# Patient Record
Sex: FEMALE | Race: WHITE | NOT HISPANIC OR LATINO | Employment: OTHER | URBAN - METROPOLITAN AREA
[De-identification: names, ages, dates, MRNs, and addresses within clinical notes are randomized per-mention and may not be internally consistent; named-entity substitution may affect disease eponyms.]

---

## 2017-01-30 ENCOUNTER — HOSPITAL ENCOUNTER (OUTPATIENT)
Dept: MAMMOGRAPHY | Facility: HOSPITAL | Age: 72
Discharge: HOME | End: 2017-01-30
Admitting: FAMILY MEDICINE

## 2017-01-30 DIAGNOSIS — Z12.31 VISIT FOR SCREENING MAMMOGRAM: ICD-10-CM

## 2017-01-30 PROCEDURE — 77063 BREAST TOMOSYNTHESIS BI: CPT | Performed by: RADIOLOGY

## 2017-01-30 PROCEDURE — G0202 SCR MAMMO BI INCL CAD: HCPCS

## 2017-01-30 PROCEDURE — G0202 SCR MAMMO BI INCL CAD: HCPCS | Performed by: RADIOLOGY

## 2017-01-30 PROCEDURE — 77063 BREAST TOMOSYNTHESIS BI: CPT

## 2017-03-30 ENCOUNTER — OFFICE VISIT (OUTPATIENT)
Dept: FAMILY MEDICINE CLINIC | Facility: CLINIC | Age: 72
End: 2017-03-30

## 2017-03-30 ENCOUNTER — APPOINTMENT (OUTPATIENT)
Dept: LAB | Facility: HOSPITAL | Age: 72
End: 2017-03-30

## 2017-03-30 VITALS
OXYGEN SATURATION: 98 % | WEIGHT: 127 LBS | SYSTOLIC BLOOD PRESSURE: 126 MMHG | DIASTOLIC BLOOD PRESSURE: 82 MMHG | TEMPERATURE: 98.2 F | BODY MASS INDEX: 19.93 KG/M2 | HEIGHT: 67 IN | HEART RATE: 81 BPM

## 2017-03-30 DIAGNOSIS — I10 ESSENTIAL HYPERTENSION: ICD-10-CM

## 2017-03-30 DIAGNOSIS — Z79.890 HORMONE REPLACEMENT THERAPY: ICD-10-CM

## 2017-03-30 PROBLEM — N95.1 MENOPAUSAL AND FEMALE CLIMACTERIC STATES: Status: ACTIVE | Noted: 2017-03-30

## 2017-03-30 LAB
ALBUMIN SERPL-MCNC: 4.5 G/DL (ref 3.2–4.8)
ALBUMIN/GLOB SERPL: 1.7 G/DL (ref 1.5–2.5)
ALP SERPL-CCNC: 61 U/L (ref 25–100)
ALT SERPL W P-5'-P-CCNC: 18 U/L (ref 7–40)
ANION GAP SERPL CALCULATED.3IONS-SCNC: 3 MMOL/L (ref 3–11)
ARTICHOKE IGE QN: 100 MG/DL (ref 0–130)
AST SERPL-CCNC: 24 U/L (ref 0–33)
BILIRUB SERPL-MCNC: 0.7 MG/DL (ref 0.3–1.2)
BUN BLD-MCNC: 14 MG/DL (ref 9–23)
BUN/CREAT SERPL: 20 (ref 7–25)
CALCIUM SPEC-SCNC: 10.1 MG/DL (ref 8.7–10.4)
CHLORIDE SERPL-SCNC: 104 MMOL/L (ref 99–109)
CHOLEST SERPL-MCNC: 216 MG/DL (ref 0–200)
CO2 SERPL-SCNC: 36 MMOL/L (ref 20–31)
CREAT BLD-MCNC: 0.7 MG/DL (ref 0.6–1.3)
GFR SERPL CREATININE-BSD FRML MDRD: 83 ML/MIN/1.73
GLOBULIN UR ELPH-MCNC: 2.7 GM/DL
GLUCOSE BLD-MCNC: 99 MG/DL (ref 70–100)
HDLC SERPL-MCNC: 102 MG/DL (ref 40–60)
POTASSIUM BLD-SCNC: 5.2 MMOL/L (ref 3.5–5.5)
PROT SERPL-MCNC: 7.2 G/DL (ref 5.7–8.2)
SODIUM BLD-SCNC: 143 MMOL/L (ref 132–146)
TRIGL SERPL-MCNC: 58 MG/DL (ref 0–150)

## 2017-03-30 PROCEDURE — G0439 PPPS, SUBSEQ VISIT: HCPCS | Performed by: PHYSICIAN ASSISTANT

## 2017-03-30 PROCEDURE — 93000 ELECTROCARDIOGRAM COMPLETE: CPT | Performed by: PHYSICIAN ASSISTANT

## 2017-03-30 PROCEDURE — 80053 COMPREHEN METABOLIC PANEL: CPT | Performed by: PHYSICIAN ASSISTANT

## 2017-03-30 PROCEDURE — 80061 LIPID PANEL: CPT | Performed by: PHYSICIAN ASSISTANT

## 2017-03-30 PROCEDURE — 36415 COLL VENOUS BLD VENIPUNCTURE: CPT | Performed by: PHYSICIAN ASSISTANT

## 2017-03-30 RX ORDER — CONTAINER,EMPTY
1 BOTTLE MISCELLANEOUS DAILY
COMMUNITY
End: 2021-05-04

## 2017-03-30 RX ORDER — LISINOPRIL 10 MG/1
10 TABLET ORAL DAILY
Qty: 90 TABLET | Refills: 1 | Status: SHIPPED | OUTPATIENT
Start: 2017-03-30 | End: 2017-10-17 | Stop reason: SDUPTHER

## 2017-03-30 RX ORDER — ASPIRIN 81 MG/1
81 TABLET ORAL DAILY
COMMUNITY
End: 2018-10-02 | Stop reason: ALTCHOICE

## 2017-03-30 RX ORDER — ESTRADIOL 0.05 MG/D
1 PATCH TRANSDERMAL WEEKLY
Qty: 12 PATCH | Refills: 3 | Status: SHIPPED | OUTPATIENT
Start: 2017-03-30 | End: 2018-04-09 | Stop reason: SDUPTHER

## 2017-03-30 RX ORDER — AMOXICILLIN 875 MG/1
875 TABLET, COATED ORAL 2 TIMES DAILY
Qty: 14 TABLET | Refills: 0 | Status: SHIPPED | OUTPATIENT
Start: 2017-03-30 | End: 2018-04-09

## 2017-03-30 RX ORDER — METRONIDAZOLE 7.5 MG/G
GEL TOPICAL 2 TIMES DAILY PRN
COMMUNITY
End: 2018-12-04

## 2017-03-30 RX ORDER — MELATONIN
1000 DAILY
COMMUNITY

## 2017-03-30 NOTE — PROGRESS NOTES
QUICK REFERENCE INFORMATION:  The ABCs of the Annual Wellness Visit    Subsequent Medicare Wellness Visit    HEALTH RISK ASSESSMENT    1945    Recent Hospitalizations:  No recent hospitalization(s)..        Current Medical Providers:  Patient Care Team:  Mariangel Holt PA-C as PCP - General (Family Medicine)        Smoking Status:  History   Smoking Status   • Never Smoker   Smokeless Tobacco   • Never Used       Alcohol Consumption:  History   Alcohol Use   • Yes     Comment: rarely       Depression Screen:   PHQ-9 Depression Screening 3/30/2017   Little interest or pleasure in doing things 0   Feeling down, depressed, or hopeless 0   Trouble falling or staying asleep, or sleeping too much 1   Feeling tired or having little energy 1   Poor appetite or overeating 0   Feeling bad about yourself - or that you are a failure or have let yourself or your family down 0   Trouble concentrating on things, such as reading the newspaper or watching television 0   Moving or speaking so slowly that other people could have noticed. Or the opposite - being so fidgety or restless that you have been moving around a lot more than usual 0   Thoughts that you would be better off dead, or of hurting yourself in some way 0   PHQ-9 Total Score 2   If you checked off any problems, how difficult have these problems made it for you to do your work, take care of things at home, or get along with other people? Not difficult at all       Health Habits and Functional and Cognitive Screening:  Functional & Cognitive Status 3/30/2017   Do you have difficulty preparing food and eating? No   Do you have difficulty bathing yourself? No   Do you have difficulty getting dressed? No   Do you have difficulty using the toilet? No   Do you have difficulty moving around from place to place? No   In the past year have you fallen or experienced a near fall? No   Do you need help using the phone?  No   Are you deaf or do you have serious difficulty  hearing?  No   Do you need help with transportation? No   Do you need help shopping? No   Do you need help preparing meals?  No   Do you need help with housework?  No   Do you need help with laundry? No   Do you need help taking your medications? No   Do you need help managing money? No   Do you have difficulty concentrating, remembering or making decisions? No       Health Habits  Current Diet: Well Balanced Diet  Dental Exam: Up to date  Eye Exam: Up to date  Exercise (times per week): 7 times per week  Current Exercise Activities Include: Walking      Does the patient have evidence of cognitive impairment? No    Asprin use counseling:not applicable      Recent Lab Results:  CMP:  Lab Results   Component Value Date    BUN 16 03/25/2016    CREATININE 0.6 03/25/2016     03/25/2016    K 5.0 03/25/2016    CO2 31 03/25/2016    CALCIUM 9.5 03/25/2016    ALBUMIN 4.2 03/25/2016    BILITOT 0.8 03/25/2016    ALKPHOS 60 03/25/2016    AST 25 03/25/2016    ALT 15 03/25/2016     Lipid Panel:  Lab Results   Component Value Date    CHLPL 218 (H) 03/25/2016    TRIG 58 03/25/2016    HDL 93 (H) 03/25/2016     LDL:     HbA1c:     Urine Microalbumin:     Visual Acuity:  No exam data present    Age-appropriate Screening Schedule:  Refer to the list below for future screening recommendations based on patient's age, sex and/or medical conditions. Orders for these recommended tests are listed in the plan section. The patient has been provided with a written plan.    Health Maintenance   Topic Date Due   • MAMMOGRAM  01/30/2019   • COLONOSCOPY  04/16/2026   • TDAP/TD VACCINES (2 - Td) 03/30/2027   • INFLUENZA VACCINE  Completed   • PNEUMOCOCCAL VACCINES (65+ LOW/MEDIUM RISK)  Completed   • ZOSTER VACCINE  Completed        Subjective   History of Present Illness    Roxie Bruce is a 71 y.o. female who presents for an Subsequent Wellness Visit.    The following portions of the patient's history were reviewed and updated as  "appropriate: current medications, past family history, past medical history, past social history, past surgical history and problem list.    Outpatient Medications Prior to Visit   Medication Sig Dispense Refill   • estradiol (CLIMARA) 0.05 MG/24HR patch Place 1 patch on the skin 1 (one) time per week. 12 patch 3   • lisinopril (PRINIVIL,ZESTRIL) 10 MG tablet Take 1 tablet by mouth Daily. 90 tablet 1     No facility-administered medications prior to visit.        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    Review of Systems    Compared to one year ago, the patient feels her physical health is better.  Compared to one year ago, the patient feels her mental health is better.    Objective     Physical Exam    Vitals:    03/30/17 1046   BP: 126/82   Pulse: 81   Temp: 98.2 °F (36.8 °C)   SpO2: 98%   Weight: 127 lb (57.6 kg)   Height: 67\" (170.2 cm)   PainSc: 0-No pain       Body mass index is 19.89 kg/(m^2).  Discussed the patient's BMI with her. The BMI is in the acceptable range.    Assessment/Plan   Patient Self-Management and Personalized Health Advice  The patient has been provided with information about: exercise and preventive services including:   · Bone densitometry screening.    Visit Diagnoses:    ICD-10-CM ICD-9-CM   1. Essential hypertension I10 401.9   2. Hormone replacement therapy Z79.890 V07.4       Orders Placed This Encounter   Procedures   • Comprehensive Metabolic Panel   • Lipid Panel       Outpatient Encounter Prescriptions as of 3/30/2017   Medication Sig Dispense Refill   • aspirin 81 MG EC tablet Take 81 mg by mouth Daily.     • Calcium Carbonate (CALTRATE 600 PO) Take 1 capsule by mouth Daily.     • cholecalciferol (VITAMIN D3) 1000 UNITS tablet " Take 1,000 Units by mouth Daily.     • FOLIC ACID PO Take 1 tablet/day by mouth.     • metroNIDAZOLE (METROGEL) 0.75 % gel Apply  topically 2 (Two) Times a Day.     • Misc. Devices (NASAL SPRAY BOTTLE) misc 1 spray into each nostril Daily.     • psyllium (METAMUCIL) 58.6 % packet Take 1 packet by mouth Daily.     • amoxicillin (AMOXIL) 875 MG tablet Take 1 tablet by mouth 2 (Two) Times a Day. 14 tablet 0   • estradiol (CLIMARA) 0.05 MG/24HR patch Place 1 patch on the skin 1 (One) Time Per Week. 12 patch 3   • lisinopril (PRINIVIL,ZESTRIL) 10 MG tablet Take 1 tablet by mouth Daily. 90 tablet 1   • [DISCONTINUED] estradiol (CLIMARA) 0.05 MG/24HR patch Place 1 patch on the skin 1 (one) time per week. 12 patch 3   • [DISCONTINUED] lisinopril (PRINIVIL,ZESTRIL) 10 MG tablet Take 1 tablet by mouth Daily. 90 tablet 1     No facility-administered encounter medications on file as of 3/30/2017.        Reviewed use of high risk medication in the elderly: yes  Reviewed for potential of harmful drug interactions in the elderly: not applicable    Follow Up:  Return in about 6 months (around 9/30/2017), or if symptoms worsen or fail to improve, for Recheck BP.     An After Visit Summary and PPPS with all of these plans were given to the patient.

## 2017-03-30 NOTE — PROGRESS NOTES
QUICK REFERENCE INFORMATION:  The ABCs of the Annual Wellness Visit    Subsequent Medicare Wellness Visit    HEALTH RISK ASSESSMENT    1945    Recent Hospitalizations:  No recent hospitalization(s)..        Current Medical Providers:  Patient Care Team:  Mariangel Holt PA-C as PCP - General (Family Medicine)        Smoking Status:  History   Smoking Status   • Never Smoker   Smokeless Tobacco   • Never Used       Alcohol Consumption:  History   Alcohol Use   • Yes     Comment: rarely       Depression Screen:   PHQ-9 Depression Screening 3/30/2017   Little interest or pleasure in doing things 0   Feeling down, depressed, or hopeless 0   Trouble falling or staying asleep, or sleeping too much 1   Feeling tired or having little energy 1   Poor appetite or overeating 0   Feeling bad about yourself - or that you are a failure or have let yourself or your family down 0   Trouble concentrating on things, such as reading the newspaper or watching television 0   Moving or speaking so slowly that other people could have noticed. Or the opposite - being so fidgety or restless that you have been moving around a lot more than usual 0   Thoughts that you would be better off dead, or of hurting yourself in some way 0   PHQ-9 Total Score 2   If you checked off any problems, how difficult have these problems made it for you to do your work, take care of things at home, or get along with other people? Not difficult at all       Health Habits and Functional and Cognitive Screening:  Functional & Cognitive Status 3/30/2017   Do you have difficulty preparing food and eating? No   Do you have difficulty bathing yourself? No   Do you have difficulty getting dressed? No   Do you have difficulty using the toilet? No   Do you have difficulty moving around from place to place? No   In the past year have you fallen or experienced a near fall? No   Do you need help using the phone?  No   Are you deaf or do you have serious difficulty  hearing?  No   Do you need help with transportation? No   Do you need help shopping? No   Do you need help preparing meals?  No   Do you need help with housework?  No   Do you need help with laundry? No   Do you need help taking your medications? No   Do you need help managing money? No   Do you have difficulty concentrating, remembering or making decisions? No       Eye exam 7/2016 Cataract surgery      Does the patient have evidence of cognitive impairment? No    Asprin use counseling:no      Recent Lab Results:  CMP:  Lab Results   Component Value Date    BUN 16 03/25/2016    CREATININE 0.6 03/25/2016     03/25/2016    K 5.0 03/25/2016    CO2 31 03/25/2016    CALCIUM 9.5 03/25/2016    ALBUMIN 4.2 03/25/2016    BILITOT 0.8 03/25/2016    ALKPHOS 60 03/25/2016    AST 25 03/25/2016    ALT 15 03/25/2016     Lipid Panel:  Lab Results   Component Value Date    CHLPL 218 (H) 03/25/2016    TRIG 58 03/25/2016    HDL 93 (H) 03/25/2016     LDL:     HbA1c:     Urine Microalbumin:     Visual Acuity:  No exam data present    Age-appropriate Screening Schedule:  Refer to the list below for future screening recommendations based on patient's age, sex and/or medical conditions. Orders for these recommended tests are listed in the plan section. The patient has been provided with a written plan.    Health Maintenance   Topic Date Due   • MAMMOGRAM  01/30/2019   • COLONOSCOPY  04/16/2026   • TDAP/TD VACCINES (2 - Td) 03/30/2027   • INFLUENZA VACCINE  Completed   • PNEUMOCOCCAL VACCINES (65+ LOW/MEDIUM RISK)  Completed   • ZOSTER VACCINE  Completed        Subjective   History of Present Illness    Roxie Bruce is a 71 y.o. female who presents for an Subsequent Wellness Visit.    The following portions of the patient's history were reviewed and updated as appropriate: allergies, past family history, past medical history, past social history, past surgical history and problem list.    Outpatient Medications Prior to Visit  "  Medication Sig Dispense Refill   • estradiol (CLIMARA) 0.05 MG/24HR patch Place 1 patch on the skin 1 (one) time per week. 12 patch 3   • lisinopril (PRINIVIL,ZESTRIL) 10 MG tablet Take 1 tablet by mouth Daily. 90 tablet 1     No facility-administered medications prior to visit.        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    Review of Systems    Compared to one year ago, the patient feels her physical health is better.  Compared to one year ago, the patient feels her mental health is better.    Objective     Physical Exam    Vitals:    03/30/17 1046   BP: 126/82   Pulse: 81   Temp: 98.2 °F (36.8 °C)   SpO2: 98%   Weight: 127 lb (57.6 kg)   Height: 67\" (170.2 cm)   PainSc: 0-No pain       Body mass index is 19.89 kg/(m^2).  Discussed the patient's BMI with her. The BMI is in the acceptable range.    Assessment/Plan   Patient Self-Management and Personalized Health Advice  The patient has been provided with information about: exercise and preventive services including:   · Bone densitometry screening.    Visit Diagnoses:    ICD-10-CM ICD-9-CM   1. Essential hypertension I10 401.9   2. Hormone replacement therapy Z79.890 V07.4       No orders of the defined types were placed in this encounter.      Outpatient Encounter Prescriptions as of 3/30/2017   Medication Sig Dispense Refill   • aspirin 81 MG EC tablet Take 81 mg by mouth Daily.     • Calcium Carbonate (CALTRATE 600 PO) Take 1 capsule by mouth Daily.     • cholecalciferol (VITAMIN D3) 1000 UNITS tablet Take 1,000 Units by mouth Daily.     • FOLIC ACID PO Take 1 tablet/day by mouth.     • metroNIDAZOLE (METROGEL) 0.75 % gel Apply  topically 2 (Two) Times a Day.     • Misc. Devices (NASAL SPRAY BOTTLE) misc 1 " spray into each nostril Daily.     • psyllium (METAMUCIL) 58.6 % packet Take 1 packet by mouth Daily.     • amoxicillin (AMOXIL) 875 MG tablet Take 1 tablet by mouth 2 (Two) Times a Day. 14 tablet 0   • estradiol (CLIMARA) 0.05 MG/24HR patch Place 1 patch on the skin 1 (One) Time Per Week. 12 patch 3   • lisinopril (PRINIVIL,ZESTRIL) 10 MG tablet Take 1 tablet by mouth Daily. 90 tablet 1   • [DISCONTINUED] estradiol (CLIMARA) 0.05 MG/24HR patch Place 1 patch on the skin 1 (one) time per week. 12 patch 3   • [DISCONTINUED] lisinopril (PRINIVIL,ZESTRIL) 10 MG tablet Take 1 tablet by mouth Daily. 90 tablet 1     No facility-administered encounter medications on file as of 3/30/2017.        Reviewed use of high risk medication in the elderly: not applicable  Reviewed for potential of harmful drug interactions in the elderly: not applicable    Follow Up:  No Follow-up on file.     An After Visit Summary and PPPS with all of these plans were given to the patient.

## 2017-03-30 NOTE — PROGRESS NOTES
Subjective   Roxie Bruce is a 71 y.o. female    History of Present Illness    Patient presents to our office today for both an annual wellness visit, subsequent along with follow-up on hypertension and estrogen replacement therapy for menopausal symptoms.  Patient's blood pressures well-controlled on medication she is doing well on it.  Patient has a blood pressure alterations, denies any shortness of breath chest pain or dizziness.  She is doing well on her ERT, states that without it she feels menopausal symptoms of hot flashes, headaches and vaginal dryness.  The following portions of the patient's history were reviewed and updated as appropriate: allergies, current medications, past social history and problem list    Review of Systems   Constitutional: Negative for fatigue and unexpected weight change.   Respiratory: Negative for cough, chest tightness and shortness of breath.    Cardiovascular: Negative for chest pain, palpitations and leg swelling.   Gastrointestinal: Negative for nausea.   Endocrine: Negative for heat intolerance ( Hot flashes well controlled on estrogen replacement therapy).   Genitourinary: Negative for vaginal discharge and vaginal pain.   Skin: Negative for color change and rash.   Neurological: Negative for dizziness, syncope, weakness and headaches.       Objective     Vitals:    03/30/17 1046   BP: 126/82   Pulse: 81   Temp: 98.2 °F (36.8 °C)   SpO2: 98%       Physical Exam   Constitutional: She appears well-developed and well-nourished.   Neck: No JVD present.   Cardiovascular: Normal rate, regular rhythm, normal heart sounds, intact distal pulses and normal pulses.    No murmur heard.  Pulmonary/Chest: Effort normal and breath sounds normal. No respiratory distress.   Abdominal: Soft. Bowel sounds are normal. There is no hepatosplenomegaly. There is no tenderness.   Musculoskeletal: She exhibits no edema.   Skin: Skin is warm and dry. No pallor.   Psychiatric: She has a normal  mood and affect. Her behavior is normal.   Nursing note and vitals reviewed.      ECG 12 Lead  Date/Time: 3/30/2017 1:17 PM  Performed by: SABINO OTERO  Authorized by: SABINO OTERO   Comparison: not compared with previous ECG   Previous ECG: no previous ECG available  Rhythm: sinus rhythm  Rate: normal  BPM: 73  Conduction: conduction normal  ST Segments: ST segments normal  T Waves: T waves normal  QRS axis: normal  Other: no other findings  Clinical impression: normal ECG  Comments: No acute changes noted            Assessment/Plan     Diagnoses and all orders for this visit:    Essential hypertension  -     lisinopril (PRINIVIL,ZESTRIL) 10 MG tablet; Take 1 tablet by mouth Daily.  -     Comprehensive Metabolic Panel  -     Lipid Panel    Hormone replacement therapy  -     estradiol (CLIMARA) 0.05 MG/24HR patch; Place 1 patch on the skin 1 (One) Time Per Week.  -     Comprehensive Metabolic Panel  -     Lipid Panel    Other orders  -     metroNIDAZOLE (METROGEL) 0.75 % gel; Apply  topically 2 (Two) Times a Day.  -     aspirin 81 MG EC tablet; Take 81 mg by mouth Daily.  -     Calcium Carbonate (CALTRATE 600 PO); Take 1 capsule by mouth Daily.  -     cholecalciferol (VITAMIN D3) 1000 UNITS tablet; Take 1,000 Units by mouth Daily.  -     FOLIC ACID PO; Take 1 tablet/day by mouth.  -     psyllium (METAMUCIL) 58.6 % packet; Take 1 packet by mouth Daily.  -     Misc. Devices (NASAL SPRAY BOTTLE) misc; 1 spray into each nostril Daily.  -     amoxicillin (AMOXIL) 875 MG tablet; Take 1 tablet by mouth 2 (Two) Times a Day.

## 2017-04-12 ENCOUNTER — TELEPHONE (OUTPATIENT)
Dept: FAMILY MEDICINE CLINIC | Facility: CLINIC | Age: 72
End: 2017-04-12

## 2017-04-12 RX ORDER — ESTRADIOL 0.05 MG/D
1 PATCH TRANSDERMAL WEEKLY
Qty: 12 PATCH | Refills: 3 | Status: SHIPPED | OUTPATIENT
Start: 2017-04-12 | End: 2018-04-09 | Stop reason: SDUPTHER

## 2017-04-24 ENCOUNTER — TELEPHONE (OUTPATIENT)
Dept: FAMILY MEDICINE CLINIC | Facility: CLINIC | Age: 72
End: 2017-04-24

## 2017-04-24 NOTE — TELEPHONE ENCOUNTER
Pa for patient estradiol as denied. Insurance states that this is not a covered medication under her plan.

## 2017-04-25 NOTE — TELEPHONE ENCOUNTER
Please explained to patient that her estrogen patch is not covered by her insurance, see who started her on this originally was it us or GYN?

## 2017-04-25 NOTE — TELEPHONE ENCOUNTER
There is a form on your desk to sign from her insurance company. Will see if it will go through after i send it back to them. Left vm for patient to call me back.

## 2017-09-27 DIAGNOSIS — I10 ESSENTIAL HYPERTENSION: ICD-10-CM

## 2017-09-28 RX ORDER — LISINOPRIL 10 MG/1
TABLET ORAL
Qty: 90 TABLET | Refills: 1 | OUTPATIENT
Start: 2017-09-28

## 2017-10-17 DIAGNOSIS — I10 ESSENTIAL HYPERTENSION: ICD-10-CM

## 2017-10-17 RX ORDER — LISINOPRIL 10 MG/1
10 TABLET ORAL DAILY
Qty: 90 TABLET | Refills: 1 | Status: SHIPPED | OUTPATIENT
Start: 2017-10-17 | End: 2018-04-09 | Stop reason: SDUPTHER

## 2018-01-02 ENCOUNTER — TRANSCRIBE ORDERS (OUTPATIENT)
Dept: ADMINISTRATIVE | Facility: HOSPITAL | Age: 73
End: 2018-01-02

## 2018-01-02 DIAGNOSIS — Z12.31 VISIT FOR SCREENING MAMMOGRAM: Primary | ICD-10-CM

## 2018-03-08 ENCOUNTER — HOSPITAL ENCOUNTER (OUTPATIENT)
Dept: MAMMOGRAPHY | Facility: HOSPITAL | Age: 73
Discharge: HOME | End: 2018-03-08
Admitting: FAMILY MEDICINE

## 2018-03-08 DIAGNOSIS — Z12.31 VISIT FOR SCREENING MAMMOGRAM: ICD-10-CM

## 2018-03-08 PROCEDURE — 77067 SCR MAMMO BI INCL CAD: CPT

## 2018-03-08 PROCEDURE — 77063 BREAST TOMOSYNTHESIS BI: CPT

## 2018-03-08 PROCEDURE — 77067 SCR MAMMO BI INCL CAD: CPT | Performed by: RADIOLOGY

## 2018-03-08 PROCEDURE — 77063 BREAST TOMOSYNTHESIS BI: CPT | Performed by: RADIOLOGY

## 2018-04-04 RX ORDER — ESTRADIOL 0.05 MG/D
PATCH TRANSDERMAL
Qty: 4 PATCH | Refills: 0 | Status: SHIPPED | OUTPATIENT
Start: 2018-04-04 | End: 2018-04-09 | Stop reason: SDUPTHER

## 2018-04-09 ENCOUNTER — APPOINTMENT (OUTPATIENT)
Dept: LAB | Facility: HOSPITAL | Age: 73
End: 2018-04-09

## 2018-04-09 ENCOUNTER — OFFICE VISIT (OUTPATIENT)
Dept: FAMILY MEDICINE CLINIC | Facility: CLINIC | Age: 73
End: 2018-04-09

## 2018-04-09 VITALS
BODY MASS INDEX: 20.4 KG/M2 | OXYGEN SATURATION: 98 % | HEIGHT: 67 IN | DIASTOLIC BLOOD PRESSURE: 70 MMHG | TEMPERATURE: 98 F | SYSTOLIC BLOOD PRESSURE: 154 MMHG | HEART RATE: 72 BPM | WEIGHT: 130 LBS

## 2018-04-09 DIAGNOSIS — Z00.00 ENCOUNTER FOR MEDICARE ANNUAL WELLNESS EXAM: Primary | ICD-10-CM

## 2018-04-09 DIAGNOSIS — K59.04 CHRONIC IDIOPATHIC CONSTIPATION: ICD-10-CM

## 2018-04-09 DIAGNOSIS — Z79.890 HORMONE REPLACEMENT THERAPY: ICD-10-CM

## 2018-04-09 DIAGNOSIS — I10 ESSENTIAL HYPERTENSION: ICD-10-CM

## 2018-04-09 LAB
ANION GAP SERPL CALCULATED.3IONS-SCNC: 6 MMOL/L (ref 3–11)
ARTICHOKE IGE QN: 112 MG/DL (ref 0–130)
BUN BLD-MCNC: 18 MG/DL (ref 9–23)
BUN/CREAT SERPL: 25.7 (ref 7–25)
CALCIUM SPEC-SCNC: 9.5 MG/DL (ref 8.7–10.4)
CHLORIDE SERPL-SCNC: 100 MMOL/L (ref 99–109)
CHOLEST SERPL-MCNC: 228 MG/DL (ref 0–200)
CO2 SERPL-SCNC: 31 MMOL/L (ref 20–31)
CREAT BLD-MCNC: 0.7 MG/DL (ref 0.6–1.3)
DEPRECATED RDW RBC AUTO: 44.1 FL (ref 37–54)
ERYTHROCYTE [DISTWIDTH] IN BLOOD BY AUTOMATED COUNT: 13 % (ref 11.3–14.5)
GFR SERPL CREATININE-BSD FRML MDRD: 82 ML/MIN/1.73
GLUCOSE BLD-MCNC: 102 MG/DL (ref 70–100)
HCT VFR BLD AUTO: 41.9 % (ref 34.5–44)
HDLC SERPL-MCNC: 105 MG/DL (ref 40–60)
HGB BLD-MCNC: 13.6 G/DL (ref 11.5–15.5)
MCH RBC QN AUTO: 30.1 PG (ref 27–31)
MCHC RBC AUTO-ENTMCNC: 32.5 G/DL (ref 32–36)
MCV RBC AUTO: 92.7 FL (ref 80–99)
PLATELET # BLD AUTO: 281 10*3/MM3 (ref 150–450)
PMV BLD AUTO: 10.5 FL (ref 6–12)
POTASSIUM BLD-SCNC: 4.7 MMOL/L (ref 3.5–5.5)
RBC # BLD AUTO: 4.52 10*6/MM3 (ref 3.89–5.14)
SODIUM BLD-SCNC: 137 MMOL/L (ref 132–146)
TRIGL SERPL-MCNC: 64 MG/DL (ref 0–150)
WBC NRBC COR # BLD: 8.64 10*3/MM3 (ref 3.5–10.8)

## 2018-04-09 PROCEDURE — 93000 ELECTROCARDIOGRAM COMPLETE: CPT | Performed by: PHYSICIAN ASSISTANT

## 2018-04-09 PROCEDURE — 85027 COMPLETE CBC AUTOMATED: CPT | Performed by: PHYSICIAN ASSISTANT

## 2018-04-09 PROCEDURE — 80061 LIPID PANEL: CPT | Performed by: PHYSICIAN ASSISTANT

## 2018-04-09 PROCEDURE — 36415 COLL VENOUS BLD VENIPUNCTURE: CPT | Performed by: PHYSICIAN ASSISTANT

## 2018-04-09 PROCEDURE — 80048 BASIC METABOLIC PNL TOTAL CA: CPT | Performed by: PHYSICIAN ASSISTANT

## 2018-04-09 PROCEDURE — G0439 PPPS, SUBSEQ VISIT: HCPCS | Performed by: PHYSICIAN ASSISTANT

## 2018-04-09 RX ORDER — ESTRADIOL 0.05 MG/D
1 PATCH TRANSDERMAL WEEKLY
Qty: 12 PATCH | Refills: 3 | Status: SHIPPED | OUTPATIENT
Start: 2018-04-09 | End: 2018-07-11 | Stop reason: ALTCHOICE

## 2018-04-09 RX ORDER — LISINOPRIL 10 MG/1
10 TABLET ORAL DAILY
Qty: 90 TABLET | Refills: 1 | Status: SHIPPED | OUTPATIENT
Start: 2018-04-09 | End: 2018-10-06 | Stop reason: SDUPTHER

## 2018-04-09 NOTE — PROGRESS NOTES
QUICK REFERENCE INFORMATION:  The ABCs of the Annual Wellness Visit    Subsequent Medicare Wellness Visit    HEALTH RISK ASSESSMENT    1945    Recent Hospitalizations:  No hospitalization(s) within the last year..        Current Medical Providers:  Patient Care Team:  Mariangel Holt PA-C as PCP - General (Family Medicine)  Parker Germain MD as Consulting Physician (Ophthalmology)        Smoking Status:  History   Smoking Status   • Never Smoker   Smokeless Tobacco   • Never Used       Alcohol Consumption:  History   Alcohol Use   • Yes     Comment: rarely       Depression Screen:   PHQ-2/PHQ-9 Depression Screening 4/9/2018   Little interest or pleasure in doing things 0   Feeling down, depressed, or hopeless 0   Trouble falling or staying asleep, or sleeping too much -   Feeling tired or having little energy -   Poor appetite or overeating -   Feeling bad about yourself - or that you are a failure or have let yourself or your family down -   Trouble concentrating on things, such as reading the newspaper or watching television -   Moving or speaking so slowly that other people could have noticed. Or the opposite - being so fidgety or restless that you have been moving around a lot more than usual -   Thoughts that you would be better off dead, or of hurting yourself in some way -   Total Score 0   If you checked off any problems, how difficult have these problems made it for you to do your work, take care of things at home, or get along with other people? -       Health Habits and Functional and Cognitive Screening:  Functional & Cognitive Status 4/9/2018   Do you have difficulty preparing food and eating? No   Do you have difficulty bathing yourself, getting dressed or grooming yourself? No   Do you have difficulty using the toilet? No   Do you have difficulty moving around from place to place? No   Do you have trouble with steps or getting out of a bed or a chair? No   In the past year have you fallen or  experienced a near fall? No   Current Diet Well Balanced Diet   Dental Exam Up to date   Eye Exam Up to date   Exercise (times per week) 5 times per week   Current Exercise Activities Include Cardiovasular Workout on Exercise Equipment   Do you need help using the phone?  No   Are you deaf or do you have serious difficulty hearing?  No   Do you need help with transportation? No   Do you need help shopping? No   Do you need help preparing meals?  No   Do you need help with housework?  No   Do you need help with laundry? No   Do you need help taking your medications? No   Do you need help managing money? Yes   Do you ever drive or ride in a car without wearing a seat belt? No   Have you felt unusual stress, anger or loneliness in the last month? No   Who do you live with? Spouse   If you need help, do you have trouble finding someone available to you? No   Have you been bothered in the last four weeks by sexual problems? No   Do you have difficulty concentrating, remembering or making decisions? -           Does the patient have evidence of cognitive impairment? No    Aspirin use counseling: Taking ASA appropriately as indicated      Recent Lab Results:  CMP:  Lab Results   Component Value Date    BUN 18 04/09/2018    CREATININE 0.70 04/09/2018    EGFRIFNONA 82 04/09/2018    BCR 25.7 (H) 04/09/2018     04/09/2018    K 4.7 04/09/2018    CO2 31.0 04/09/2018    CALCIUM 9.5 04/09/2018    ALBUMIN 4.50 03/30/2017    LABIL2 1.7 03/30/2017    BILITOT 0.7 03/30/2017    ALKPHOS 61 03/30/2017    AST 24 03/30/2017    ALT 18 03/30/2017     Lipid Panel:  Lab Results   Component Value Date    CHOL 228 (H) 04/09/2018    TRIG 64 04/09/2018     (H) 04/09/2018     HbA1c:       Visual Acuity:  No exam data present    Age-appropriate Screening Schedule:  Refer to the list below for future screening recommendations based on patient's age, sex and/or medical conditions. Orders for these recommended tests are listed in the plan  section. The patient has been provided with a written plan.    Health Maintenance   Topic Date Due   • MAMMOGRAM  03/08/2020   • COLONOSCOPY  04/16/2026   • TDAP/TD VACCINES (2 - Td) 03/30/2027   • INFLUENZA VACCINE  Completed   • PNEUMOCOCCAL VACCINES (65+ LOW/MEDIUM RISK)  Completed   • ZOSTER VACCINE  Completed        Subjective   History of Present Illness    Roxie Bruce is a 72 y.o. female who presents for an Subsequent Wellness VisitAs well as for follow-up on hypertension which is currently stable, needs labs rechecked and medications refilled.    The following portions of the patient's history were reviewed and updated as appropriate: current medications, past family history, past medical history, past social history, past surgical history and problem list.    Outpatient Medications Prior to Visit   Medication Sig Dispense Refill   • aspirin 81 MG EC tablet Take 81 mg by mouth Daily.     • Calcium Carbonate (CALTRATE 600 PO) Take 1 capsule by mouth Daily.     • cholecalciferol (VITAMIN D3) 1000 UNITS tablet Take 1,000 Units by mouth Daily.     • FOLIC ACID PO Take 1 tablet/day by mouth.     • metroNIDAZOLE (METROGEL) 0.75 % gel Apply  topically 2 (Two) Times a Day As Needed.     • Misc. Devices (NASAL SPRAY BOTTLE) misc 1 spray into each nostril Daily.     • psyllium (METAMUCIL) 58.6 % packet Take 1 packet by mouth 2 (Two) Times a Day.     • estradiol (CLIMARA) 0.05 MG/24HR patch Place 1 patch on the skin 1 (One) Time Per Week. 12 patch 3   • lisinopril (PRINIVIL,ZESTRIL) 10 MG tablet Take 1 tablet by mouth Daily. 90 tablet 1   • amoxicillin (AMOXIL) 875 MG tablet Take 1 tablet by mouth 2 (Two) Times a Day. 14 tablet 0   • estradiol (CLIMARA) 0.05 MG/24HR patch Place 1 patch on the skin 1 (One) Time Per Week. 12 patch 3   • estradiol (CLIMARA) 0.05 MG/24HR patch PLACE 1 PATCH ON THE SKIN ONCE A WEEK 4 patch 0     No facility-administered medications prior to visit.        Patient Active Problem List  "  Diagnosis   • Essential hypertension   • Menopausal and female climacteric states       Advance Care Planning:  has NO advance directive - information provided to the patient today    Identification of Risk Factors:  Risk factors include: weight .    Review of Systems   Constitutional: Negative for fatigue and unexpected weight change.   Respiratory: Negative for cough, chest tightness and shortness of breath.    Cardiovascular: Negative for chest pain, palpitations and leg swelling.   Gastrointestinal: Positive for constipation ( Chronic constipation, stable with Metamucil). Negative for nausea.   Skin: Negative for color change and rash.   Neurological: Negative for dizziness, syncope, weakness and headaches.       Compared to one year ago, the patient feels her physical health is better.  Compared to one year ago, the patient feels her mental health is better.    Objective       Physical Exam   Constitutional: She appears well-developed and well-nourished. No distress.   Neck: No JVD present.   Cardiovascular: Normal rate, regular rhythm, normal heart sounds and intact distal pulses.    No murmur heard.  Pulmonary/Chest: Effort normal and breath sounds normal. No respiratory distress. She exhibits no tenderness.   Abdominal: Soft. She exhibits no distension. There is no tenderness.   Musculoskeletal: She exhibits no edema.   Skin: Skin is warm and dry. She is not diaphoretic. No erythema. No pallor.   Psychiatric: She has a normal mood and affect.   Nursing note and vitals reviewed.      Vitals:    04/09/18 1007   BP: 154/70   Pulse: 72   Temp: 98 °F (36.7 °C)   TempSrc: Oral   SpO2: 98%   Weight: 59 kg (130 lb)   Height: 170.2 cm (67\")     ECG 12 Lead  Date/Time: 4/9/2018 5:38 PM  Performed by: SABINO OTERO  Authorized by: SABINO OTERO   Comparison: not compared with previous ECG   Rhythm: sinus rhythm  Rate: normal  BPM: 68  Conduction: conduction normal  ST Segments: ST segments normal  T Waves: " T waves normal  QRS axis: normal  Other: no other findings  Clinical impression: normal ECG            Body mass index is 20.36 kg/m².  Discussed the patient's BMI with her. BMI is within normal parameters. No follow-up required.    Assessment/Plan   Patient Self-Management and Personalized Health Advice  The patient has been provided with information about: exercise and preventive services including:   · Exercise counseling provided.    Visit Diagnoses:    ICD-10-CM ICD-9-CM   1. Encounter for Medicare annual wellness exam Z00.00 V70.0   2. Essential hypertension I10 401.9   3. Hormone replacement therapy Z79.890 V07.4   4. Chronic idiopathic constipation K59.04 564.00       Orders Placed This Encounter   Procedures   • CBC (No Diff)   • Basic Metabolic Panel   • Lipid Panel   • ECG 12 Lead     This order was created via procedure documentation       Outpatient Encounter Prescriptions as of 4/9/2018   Medication Sig Dispense Refill   • aspirin 81 MG EC tablet Take 81 mg by mouth Daily.     • Calcium Carbonate (CALTRATE 600 PO) Take 1 capsule by mouth Daily.     • cholecalciferol (VITAMIN D3) 1000 UNITS tablet Take 1,000 Units by mouth Daily.     • estradiol (CLIMARA) 0.05 MG/24HR patch Place 1 patch on the skin 1 (One) Time Per Week. 12 patch 3   • FOLIC ACID PO Take 1 tablet/day by mouth.     • lisinopril (PRINIVIL,ZESTRIL) 10 MG tablet Take 1 tablet by mouth Daily. 90 tablet 1   • metroNIDAZOLE (METROGEL) 0.75 % gel Apply  topically 2 (Two) Times a Day As Needed.     • Misc. Devices (NASAL SPRAY BOTTLE) misc 1 spray into each nostril Daily.     • psyllium (METAMUCIL) 58.6 % packet Take 1 packet by mouth 2 (Two) Times a Day.     • [DISCONTINUED] estradiol (CLIMARA) 0.05 MG/24HR patch Place 1 patch on the skin 1 (One) Time Per Week. 12 patch 3   • [DISCONTINUED] lisinopril (PRINIVIL,ZESTRIL) 10 MG tablet Take 1 tablet by mouth Daily. 90 tablet 1   • [DISCONTINUED] amoxicillin (AMOXIL) 875 MG tablet Take 1 tablet by  mouth 2 (Two) Times a Day. 14 tablet 0   • [DISCONTINUED] estradiol (CLIMARA) 0.05 MG/24HR patch Place 1 patch on the skin 1 (One) Time Per Week. 12 patch 3   • [DISCONTINUED] estradiol (CLIMARA) 0.05 MG/24HR patch PLACE 1 PATCH ON THE SKIN ONCE A WEEK 4 patch 0     No facility-administered encounter medications on file as of 4/9/2018.        Reviewed use of high risk medication in the elderly: yes  Reviewed for potential of harmful drug interactions in the elderly: yes    Follow Up:  Return in about 6 months (around 10/9/2018).     An After Visit Summary and PPPS with all of these plans were given to the patient.

## 2018-07-11 RX ORDER — ESTRADIOL 0.05 MG/D
1 FILM, EXTENDED RELEASE TRANSDERMAL WEEKLY
Qty: 12 PATCH | Refills: 3 | Status: SHIPPED | OUTPATIENT
Start: 2018-07-11 | End: 2018-07-20 | Stop reason: SDUPTHER

## 2018-07-19 ENCOUNTER — TELEPHONE (OUTPATIENT)
Dept: FAMILY MEDICINE CLINIC | Facility: CLINIC | Age: 73
End: 2018-07-19

## 2018-07-20 RX ORDER — ESTRADIOL 0.05 MG/D
FILM, EXTENDED RELEASE TRANSDERMAL
Qty: 12 PATCH | Refills: 3 | OUTPATIENT
Start: 2018-07-20 | End: 2018-12-04 | Stop reason: HOSPADM

## 2018-07-20 NOTE — TELEPHONE ENCOUNTER
Please check with patient on how she has been using this typically it is twice weekly but if she only feels that she needs it once weekly we can decrease it down to but does

## 2018-07-20 NOTE — TELEPHONE ENCOUNTER
Patient wants once a week, I will notify [facility]. However after speaking to the pharmacist, this is a twice weekly medication. notified patient of this.

## 2018-08-11 NOTE — TELEPHONE ENCOUNTER
----- Message from April BHAVIK Byrnes sent at 7/18/2018  1:26 PM EDT -----  Contact: [facility]   CALL FROM PHARMACIST WITH [facility] WANTING TO KNOW IF THE estradiol (MINIVELLE, VIVELLE-DOT) 0.05 MG/24HR patch SHOULD BE WEEKLY? STATES THAT IT HAS ALWAYS BEEN WEEKLY BUT THE LAST RX THAT WAS SENT IN FOR THIS SAYS TWICE WEEKLY. CALL BACK NUMBER [phone] REFERENCE NUMBER [id number]   PACU to Inpatient Nursing Handoff    Patient Paulina Fonseca is a 48 year old female who speaks Data Unavailable.   Procedure Procedure(s):  Open Versus Closed Left Hip Reduction - Wound Class: I-Clean   Surgeon(s) Primary: Kosta Pastrana MD  Resident - Assisting: Dre Franz MD; Mike Ying MD     Allergies   Allergen Reactions     Codeine GI Disturbance       Isolation  [unfilled]     Past Medical History   has no past medical history on file.    Anesthesia General   Dermatome Level     Preop Meds gabapentin (Neurontin) - time given: 0747   Nerve block Not applicable   Intraop Meds fentanyl (Sublimaze): 100 mcg total  ondansetron (Zofran): last given at 0830   Local Meds No   Antibiotics Not applicable     Pain Patient Currently in Pain: yes  Comfort: comfortably manageable  Pain Control: partially effective   PACU meds  hydromorphone (Dilaudid): .8 mg (total dose) last given at 0905    PCA / epidural No   Capnography Respiratory Monitoring (EtCO2): 44 mmHg  Integrated Pulmonary Index (IPI): 10   Telemetry ECG Rhythm: Normal sinus rhythm   Inpatient Telemetry Monitor Ordered? No        Labs Glucose Lab Results   Component Value Date    GLC 83 08/11/2018       Hgb Lab Results   Component Value Date    HGB 9.6 08/11/2018       INR Lab Results   Component Value Date    INR 0.97 08/11/2018      PACU Imaging Not applicable     Wound/Incision     CMS Peripheral Neurovascular WDL: WDL (08/11/18 0835)  All Extremities Temperature: warm (08/11/18 0835)  All Extremities Color: no discoloration (08/11/18 0835)  All Extremities Sensation: numbness present (baseline ) (08/11/18 0200)  LLE Temperature: warm (08/11/18 0915)  LLE Color: no discoloration (08/11/18 0915)  LLE Sensation: tingling present (08/11/18 0915)   Equipment ice pack   Other LDA       IV Access Peripheral IV 08/10/18 Left (Active)   Site Assessment WDL 8/11/2018  8:35 AM   Line Status Infusing 8/11/2018  8:35 AM   Phlebitis Scale 0-->no  symptoms 8/11/2018  8:35 AM   Infiltration Scale 0 8/11/2018  8:35 AM   Infiltration Site Treatment Method  None 8/10/2018  6:30 PM   Extravasation? No 8/10/2018  6:30 PM   Number of days:1      Blood Products Not applicable EBL 0 mL   Intake/Output Date 08/11/18 0700 - 08/12/18 0659   Shift 0790-98810 6778-8559 9723-1578 24 Hour Total   I  N  T  A  K  E   I.V. 200   200    Shift Total  (mL/kg) 200  (2.94)   200  (2.94)   O  U  T  P  U  T   Urine 410   410    Blood 0   0    Shift Total  (mL/kg) 410  (6.03)   410  (6.03)   Weight (kg) 68.04 68.04 68.04 68.04        Drains / Lopez Urethral Catheter (Active)   Tube Description Positional 8/11/2018  2:00 AM   Collection Container Standard;Patent 8/11/2018  8:35 AM   Securement Method Securing device (Describe) 8/11/2018  8:35 AM   Urine Output 210 mL 8/11/2018  8:56 AM   Number of days:1      Time of void PreOp Void Prior to Procedure:  (lopez in) (08/11/18 0507)    PostOp      Diapered? No   Bladder Scan     PO    ice chips     Vitals    B/P: 111/67  T: 98.4  F (36.9  C)    Temp src: Oral  P:  Pulse: 75 (08/11/18 0641)    Heart Rate: 76 (08/11/18 0900)     R: 20  O2:  SpO2: 98 %    O2 Device: Nasal cannula (08/11/18 0920)    Oxygen Delivery: 2 LPM (08/11/18 0920)         Family/support present mother   Patient belongings Patient Belongings: jewelry (rings x3 taped)   Patient transported on bed   DC meds/scripts (obs/outpt) Not applicable   Inpatient Pain Meds Released? No       Special needs/considerations None   Tasks needing completion None       Shanta Figueroa, ABBY  [facility] [id number]

## 2018-10-02 ENCOUNTER — OFFICE VISIT (OUTPATIENT)
Dept: FAMILY MEDICINE CLINIC | Facility: CLINIC | Age: 73
End: 2018-10-02

## 2018-10-02 VITALS
DIASTOLIC BLOOD PRESSURE: 64 MMHG | HEART RATE: 72 BPM | TEMPERATURE: 97.6 F | RESPIRATION RATE: 14 BRPM | OXYGEN SATURATION: 98 % | BODY MASS INDEX: 20.28 KG/M2 | SYSTOLIC BLOOD PRESSURE: 148 MMHG | WEIGHT: 129.2 LBS | HEIGHT: 67 IN

## 2018-10-02 DIAGNOSIS — T78.40XA ALLERGIC REACTION, INITIAL ENCOUNTER: Primary | ICD-10-CM

## 2018-10-02 PROCEDURE — 99213 OFFICE O/P EST LOW 20 MIN: CPT | Performed by: PHYSICIAN ASSISTANT

## 2018-10-02 RX ORDER — IBUPROFEN 200 MG
TABLET ORAL
COMMUNITY
Start: 2018-09-11 | End: 2019-01-16

## 2018-10-02 NOTE — PROGRESS NOTES
Subjective   Roxie Bruce is a 73 y.o. female  Lip Swelling (Both lips were swollen when she woke up this morning. Treating with ice and it has improved. Ate peanuts last night but has never had an allergy. )      History of Present Illness  Patient is a pleasant 71-year-old white female who states woke up this morning with lips being swollen she thinks she had allergic reaction she denies any shortness breath no chest pain states no medication changes today she possibly even something last night because reaction  No shortness breath no chest pain    The following portions of the patient's history were reviewed and updated as appropriate: allergies, current medications, past social history and problem list    Review of Systems   Constitutional: Negative for appetite change, diaphoresis, fatigue and unexpected weight change.   Eyes: Negative for visual disturbance.   Respiratory: Negative for cough, chest tightness and shortness of breath.    Cardiovascular: Negative for chest pain, palpitations and leg swelling.   Gastrointestinal: Negative for diarrhea, nausea and vomiting.   Endocrine: Negative for polydipsia, polyphagia and polyuria.   Skin: Negative for color change and rash.   Neurological: Negative for dizziness, syncope, weakness, light-headedness, numbness and headaches.       Objective     Vitals:    10/02/18 1123   BP: 148/64   Pulse: 72   Resp: 14   Temp: 97.6 °F (36.4 °C)   SpO2: 98%       Physical Exam   Constitutional: She appears well-developed and well-nourished.   HENT:   Head:       Neck: Neck supple. No JVD present. No thyromegaly present.   Cardiovascular: Normal rate, regular rhythm, normal heart sounds, intact distal pulses and normal pulses.    No murmur heard.  Pulmonary/Chest: Effort normal and breath sounds normal. No respiratory distress.   Abdominal: Soft. Bowel sounds are normal. There is no hepatosplenomegaly. There is no tenderness.   Musculoskeletal: She exhibits no edema.    Lymphadenopathy:     She has no cervical adenopathy.   Neurological: No sensory deficit.   Skin: Skin is warm and dry. She is not diaphoretic.   Nursing note and vitals reviewed.      Assessment/Plan     Diagnoses and all orders for this visit:    Allergic reaction, initial encounter    Other orders  -     ibuprofen (ADVIL) 200 MG tablet;     #1 reactions definitely allergic, she states has been better since she's been putting ice on lip not painful no blisters seen patient states she will start taking Benadryl 25 mg over-the-counter I1) gave her Depo-Medrol 60 mg IM stat she denies any shortness of breath chest pain any respiratory symptoms.

## 2018-10-06 DIAGNOSIS — I10 ESSENTIAL HYPERTENSION: ICD-10-CM

## 2018-10-08 RX ORDER — LISINOPRIL 10 MG/1
TABLET ORAL
Qty: 90 TABLET | Refills: 1 | Status: SHIPPED | OUTPATIENT
Start: 2018-10-08 | End: 2019-04-12 | Stop reason: SDUPTHER

## 2018-12-04 ENCOUNTER — LAB (OUTPATIENT)
Dept: LAB | Facility: HOSPITAL | Age: 73
End: 2018-12-04

## 2018-12-04 ENCOUNTER — OFFICE VISIT (OUTPATIENT)
Dept: FAMILY MEDICINE CLINIC | Facility: CLINIC | Age: 73
End: 2018-12-04

## 2018-12-04 ENCOUNTER — HOSPITAL ENCOUNTER (OUTPATIENT)
Dept: GENERAL RADIOLOGY | Facility: HOSPITAL | Age: 73
Discharge: HOME | End: 2018-12-04
Admitting: FAMILY MEDICINE

## 2018-12-04 VITALS
BODY MASS INDEX: 19.93 KG/M2 | HEIGHT: 67 IN | OXYGEN SATURATION: 99 % | TEMPERATURE: 98.2 F | HEART RATE: 84 BPM | WEIGHT: 127 LBS | DIASTOLIC BLOOD PRESSURE: 60 MMHG | SYSTOLIC BLOOD PRESSURE: 142 MMHG

## 2018-12-04 DIAGNOSIS — M19.90 ARTHRITIS: ICD-10-CM

## 2018-12-04 DIAGNOSIS — M25.551 PAIN OF BOTH HIP JOINTS: ICD-10-CM

## 2018-12-04 DIAGNOSIS — M79.10 MYALGIA: ICD-10-CM

## 2018-12-04 DIAGNOSIS — M79.10 MYALGIA: Primary | ICD-10-CM

## 2018-12-04 DIAGNOSIS — M25.552 PAIN OF BOTH HIP JOINTS: ICD-10-CM

## 2018-12-04 LAB
ALBUMIN SERPL-MCNC: 4.77 G/DL (ref 3.2–4.8)
ALBUMIN/GLOB SERPL: 1.7 G/DL (ref 1.5–2.5)
ALP SERPL-CCNC: 86 U/L (ref 25–100)
ALT SERPL W P-5'-P-CCNC: 14 U/L (ref 7–40)
ANION GAP SERPL CALCULATED.3IONS-SCNC: 8 MMOL/L (ref 3–11)
AST SERPL-CCNC: 18 U/L (ref 0–33)
BASOPHILS # BLD AUTO: 0.02 10*3/MM3 (ref 0–0.2)
BASOPHILS NFR BLD AUTO: 0.2 % (ref 0–1)
BILIRUB SERPL-MCNC: 0.5 MG/DL (ref 0.3–1.2)
BUN BLD-MCNC: 16 MG/DL (ref 9–23)
BUN/CREAT SERPL: 25 (ref 7–25)
CALCIUM SPEC-SCNC: 10 MG/DL (ref 8.7–10.4)
CHLORIDE SERPL-SCNC: 101 MMOL/L (ref 99–109)
CO2 SERPL-SCNC: 29 MMOL/L (ref 20–31)
CREAT BLD-MCNC: 0.64 MG/DL (ref 0.6–1.3)
CRP SERPL-MCNC: 1.96 MG/DL (ref 0–1)
DEPRECATED RDW RBC AUTO: 44.5 FL (ref 37–54)
EOSINOPHIL # BLD AUTO: 0.08 10*3/MM3 (ref 0–0.3)
EOSINOPHIL NFR BLD AUTO: 0.7 % (ref 0–3)
ERYTHROCYTE [DISTWIDTH] IN BLOOD BY AUTOMATED COUNT: 13.6 % (ref 11.3–14.5)
ERYTHROCYTE [SEDIMENTATION RATE] IN BLOOD: 45 MM/HR (ref 0–30)
GFR SERPL CREATININE-BSD FRML MDRD: 91 ML/MIN/1.73
GLOBULIN UR ELPH-MCNC: 2.7 GM/DL
GLUCOSE BLD-MCNC: 94 MG/DL (ref 70–100)
HCT VFR BLD AUTO: 42 % (ref 34.5–44)
HGB BLD-MCNC: 13 G/DL (ref 11.5–15.5)
IMM GRANULOCYTES # BLD: 0.05 10*3/MM3 (ref 0–0.03)
IMM GRANULOCYTES NFR BLD: 0.5 % (ref 0–0.6)
LYMPHOCYTES # BLD AUTO: 2 10*3/MM3 (ref 0.6–4.8)
LYMPHOCYTES NFR BLD AUTO: 18.2 % (ref 24–44)
MCH RBC QN AUTO: 28.3 PG (ref 27–31)
MCHC RBC AUTO-ENTMCNC: 31 G/DL (ref 32–36)
MCV RBC AUTO: 91.5 FL (ref 80–99)
MONOCYTES # BLD AUTO: 0.57 10*3/MM3 (ref 0–1)
MONOCYTES NFR BLD AUTO: 5.2 % (ref 0–12)
NEUTROPHILS # BLD AUTO: 8.29 10*3/MM3 (ref 1.5–8.3)
NEUTROPHILS NFR BLD AUTO: 75.7 % (ref 41–71)
PLATELET # BLD AUTO: 506 10*3/MM3 (ref 150–450)
PMV BLD AUTO: 10.4 FL (ref 6–12)
POTASSIUM BLD-SCNC: 4.6 MMOL/L (ref 3.5–5.5)
PROT SERPL-MCNC: 7.5 G/DL (ref 5.7–8.2)
RBC # BLD AUTO: 4.59 10*6/MM3 (ref 3.89–5.14)
SODIUM BLD-SCNC: 138 MMOL/L (ref 132–146)
URATE SERPL-MCNC: 3.8 MG/DL (ref 3.1–7.8)
WBC NRBC COR # BLD: 10.96 10*3/MM3 (ref 3.5–10.8)

## 2018-12-04 PROCEDURE — 86431 RHEUMATOID FACTOR QUANT: CPT

## 2018-12-04 PROCEDURE — 86235 NUCLEAR ANTIGEN ANTIBODY: CPT

## 2018-12-04 PROCEDURE — 80053 COMPREHEN METABOLIC PANEL: CPT

## 2018-12-04 PROCEDURE — 84550 ASSAY OF BLOOD/URIC ACID: CPT

## 2018-12-04 PROCEDURE — 86430 RHEUMATOID FACTOR TEST QUAL: CPT

## 2018-12-04 PROCEDURE — 86140 C-REACTIVE PROTEIN: CPT

## 2018-12-04 PROCEDURE — 86038 ANTINUCLEAR ANTIBODIES: CPT

## 2018-12-04 PROCEDURE — 85025 COMPLETE CBC W/AUTO DIFF WBC: CPT

## 2018-12-04 PROCEDURE — 99214 OFFICE O/P EST MOD 30 MIN: CPT | Performed by: FAMILY MEDICINE

## 2018-12-04 PROCEDURE — 36415 COLL VENOUS BLD VENIPUNCTURE: CPT

## 2018-12-04 PROCEDURE — 73522 X-RAY EXAM HIPS BI 3-4 VIEWS: CPT

## 2018-12-04 PROCEDURE — 85652 RBC SED RATE AUTOMATED: CPT

## 2018-12-04 RX ORDER — ASPIRIN 81 MG/1
TABLET ORAL
COMMUNITY
Start: 2018-10-05 | End: 2020-05-21

## 2018-12-04 RX ORDER — ESTRADIOL 0.05 MG/D
1 PATCH TRANSDERMAL WEEKLY
COMMUNITY
End: 2019-01-16 | Stop reason: SDUPTHER

## 2018-12-04 RX ORDER — ESTRADIOL 0.05 MG/D
1 FILM, EXTENDED RELEASE TRANSDERMAL WEEKLY
COMMUNITY
End: 2018-12-04

## 2018-12-04 NOTE — PROGRESS NOTES
Subjective   Roxie Bruce is a 73 y.o. female    Chief Complaint    Right hip area pain  Generalized arthralgias  Generalized myalgias      History of Present Illness   Patient relates a 5 month history of intermittent musculoskeletal issues with particular pain in the right hip, pelvis and lower extremity.  She has had intermittent pains in her upper extremities primarily in the shoulder regions and upper arms.  She exercises regularly but feels she has lost a great deal of flexibility.  She denies any episodes of acute swelling redness or warmth of any joints but has joint stiffness and pain on an intermittent basis.  She relates a remote history of some Raynauds phenomenon symptoms.  She reports that her weight has been stable, appetite good, no night sweats or fevers, no suspicious masses or lymphadenopathy.  She is compliant with the few medications that she takes and reports there have been no changes.  She admits that there was some significant stress when all of her symptoms started back in June.      The following portions of the patient's history were reviewed and updated as appropriate: allergies, current medications, past social history and problem list    Review of Systems   Constitutional: Negative for chills, fatigue, fever and unexpected weight change.   HENT: Negative.    Respiratory: Negative for shortness of breath and wheezing.    Cardiovascular: Negative for chest pain, palpitations and leg swelling.   Gastrointestinal: Negative.    Endocrine: Negative for polydipsia, polyphagia and polyuria.   Genitourinary: Negative.    Musculoskeletal: Positive for arthralgias, back pain, gait problem and myalgias.   Skin: Negative for color change and rash.   Neurological: Negative for dizziness, syncope, weakness, light-headedness and numbness.   Psychiatric/Behavioral: Negative for dysphoric mood. The patient is not nervous/anxious.        Objective     Vitals:    12/04/18 1049   BP: 142/60   Pulse:  84   Temp: 98.2 °F (36.8 °C)   SpO2: 99%       Physical Exam   Constitutional: She is oriented to person, place, and time. She appears well-developed and well-nourished.   Eyes: Conjunctivae are normal. Pupils are equal, round, and reactive to light. No scleral icterus.   Neck: Normal range of motion. Neck supple. No thyromegaly present.   Cardiovascular: Normal rate and regular rhythm.   Pulmonary/Chest: Effort normal and breath sounds normal.   Musculoskeletal:        Right hip: She exhibits decreased range of motion and decreased strength.        Left hip: She exhibits decreased range of motion and decreased strength.   Tender right SI area   Lymphadenopathy:     She has no cervical adenopathy.   Neurological: She is alert and oriented to person, place, and time. No sensory deficit. She exhibits normal muscle tone.   Nursing note and vitals reviewed.      Assessment/Plan   Problem List Items Addressed This Visit     None      Visit Diagnoses     Myalgia    -  Primary    Relevant Orders    CBC & Differential    Nuclear Antigen Antibody, IFA    C-reactive Protein    Sedimentation Rate    Sjogren's Antibody, Anti-SS-A / -SS-B    Comprehensive Metabolic Panel    XR Hips Bilateral With or Without Pelvis 3-4 View    Arthritis        Relevant Orders    Rheumatoid Factor    Sedimentation Rate    Sjogren's Antibody, Anti-SS-A / -SS-B    Comprehensive Metabolic Panel    Uric Acid    XR Hips Bilateral With or Without Pelvis 3-4 View    Pain of both hip joints        Relevant Orders    CBC & Differential    Rheumatoid Factor    Comprehensive Metabolic Panel    Uric Acid    XR Hips Bilateral With or Without Pelvis 3-4 View

## 2018-12-05 LAB
RHEUMATOID FACT SERPL-ACNC: POSITIVE [IU]/ML
RHEUMATOID FACT TITR SER: NORMAL IU/ML

## 2018-12-06 LAB
ANA HOMOGEN TITR SER: ABNORMAL {TITER}
ANA SER QL IA: POSITIVE
ENA SS-A AB SER-ACNC: <0.2 AI (ref 0–0.9)
ENA SS-B AB SER-ACNC: <0.2 AI (ref 0–0.9)
Lab: ABNORMAL
MIDBODY PATTERN: ABNORMAL

## 2018-12-13 ENCOUNTER — OFFICE VISIT (OUTPATIENT)
Dept: FAMILY MEDICINE CLINIC | Facility: CLINIC | Age: 73
End: 2018-12-13

## 2018-12-13 VITALS
HEART RATE: 78 BPM | SYSTOLIC BLOOD PRESSURE: 144 MMHG | BODY MASS INDEX: 20.09 KG/M2 | TEMPERATURE: 98.6 F | OXYGEN SATURATION: 99 % | HEIGHT: 67 IN | WEIGHT: 128 LBS | DIASTOLIC BLOOD PRESSURE: 62 MMHG

## 2018-12-13 DIAGNOSIS — M05.80 POLYARTHRITIS WITH POSITIVE RHEUMATOID FACTOR (HCC): Primary | ICD-10-CM

## 2018-12-13 DIAGNOSIS — R76.8 POSITIVE ANA (ANTINUCLEAR ANTIBODY): ICD-10-CM

## 2018-12-13 DIAGNOSIS — M25.552 PAIN OF BOTH HIP JOINTS: ICD-10-CM

## 2018-12-13 DIAGNOSIS — Z23 IMMUNIZATION DUE: ICD-10-CM

## 2018-12-13 DIAGNOSIS — M25.551 PAIN OF BOTH HIP JOINTS: ICD-10-CM

## 2018-12-13 PROCEDURE — 99214 OFFICE O/P EST MOD 30 MIN: CPT | Performed by: FAMILY MEDICINE

## 2019-01-08 ENCOUNTER — APPOINTMENT (OUTPATIENT)
Dept: GENERAL RADIOLOGY | Facility: HOSPITAL | Age: 74
End: 2019-01-08

## 2019-01-08 ENCOUNTER — APPOINTMENT (OUTPATIENT)
Dept: CT IMAGING | Facility: HOSPITAL | Age: 74
End: 2019-01-08

## 2019-01-08 ENCOUNTER — HOSPITAL ENCOUNTER (OUTPATIENT)
Facility: HOSPITAL | Age: 74
Setting detail: OBSERVATION
Discharge: HOME | End: 2019-01-09
Attending: EMERGENCY MEDICINE | Admitting: INTERNAL MEDICINE

## 2019-01-08 DIAGNOSIS — R07.9 CHEST PAIN, UNSPECIFIED TYPE: Primary | ICD-10-CM

## 2019-01-08 LAB
ALBUMIN SERPL-MCNC: 4.58 G/DL (ref 3.2–4.8)
ALBUMIN/GLOB SERPL: 1.7 G/DL (ref 1.5–2.5)
ALP SERPL-CCNC: 91 U/L (ref 25–100)
ALT SERPL W P-5'-P-CCNC: 19 U/L (ref 7–40)
ANION GAP SERPL CALCULATED.3IONS-SCNC: 6 MMOL/L (ref 3–11)
AST SERPL-CCNC: 22 U/L (ref 0–33)
BASOPHILS # BLD AUTO: 0.02 10*3/MM3 (ref 0–0.2)
BASOPHILS NFR BLD AUTO: 0.3 % (ref 0–1)
BILIRUB SERPL-MCNC: 0.5 MG/DL (ref 0.3–1.2)
BNP SERPL-MCNC: 70 PG/ML (ref 0–100)
BUN BLD-MCNC: 19 MG/DL (ref 9–23)
BUN/CREAT SERPL: 25.7 (ref 7–25)
CALCIUM SPEC-SCNC: 9.5 MG/DL (ref 8.7–10.4)
CHLORIDE SERPL-SCNC: 100 MMOL/L (ref 99–109)
CO2 SERPL-SCNC: 29 MMOL/L (ref 20–31)
CREAT BLD-MCNC: 0.74 MG/DL (ref 0.6–1.3)
D DIMER PPP FEU-MCNC: 3.58 MG/L (FEU) (ref 0–0.5)
DEPRECATED RDW RBC AUTO: 44.7 FL (ref 37–54)
EOSINOPHIL # BLD AUTO: 0.24 10*3/MM3 (ref 0–0.3)
EOSINOPHIL NFR BLD AUTO: 3.1 % (ref 0–3)
ERYTHROCYTE [DISTWIDTH] IN BLOOD BY AUTOMATED COUNT: 13.7 % (ref 11.3–14.5)
GFR SERPL CREATININE-BSD FRML MDRD: 77 ML/MIN/1.73
GLOBULIN UR ELPH-MCNC: 2.6 GM/DL
GLUCOSE BLD-MCNC: 92 MG/DL (ref 70–100)
HCT VFR BLD AUTO: 39.8 % (ref 34.5–44)
HGB BLD-MCNC: 12.8 G/DL (ref 11.5–15.5)
HOLD SPECIMEN: NORMAL
HOLD SPECIMEN: NORMAL
IMM GRANULOCYTES # BLD AUTO: 0.02 10*3/MM3 (ref 0–0.03)
IMM GRANULOCYTES NFR BLD AUTO: 0.3 % (ref 0–0.6)
LIPASE SERPL-CCNC: 43 U/L (ref 6–51)
LYMPHOCYTES # BLD AUTO: 2.51 10*3/MM3 (ref 0.6–4.8)
LYMPHOCYTES NFR BLD AUTO: 32.9 % (ref 24–44)
MCH RBC QN AUTO: 28.4 PG (ref 27–31)
MCHC RBC AUTO-ENTMCNC: 32.2 G/DL (ref 32–36)
MCV RBC AUTO: 88.4 FL (ref 80–99)
MONOCYTES # BLD AUTO: 0.42 10*3/MM3 (ref 0–1)
MONOCYTES NFR BLD AUTO: 5.5 % (ref 0–12)
NEUTROPHILS # BLD AUTO: 4.43 10*3/MM3 (ref 1.5–8.3)
NEUTROPHILS NFR BLD AUTO: 58.2 % (ref 41–71)
PLATELET # BLD AUTO: 412 10*3/MM3 (ref 150–450)
PMV BLD AUTO: 9.4 FL (ref 6–12)
POTASSIUM BLD-SCNC: 4 MMOL/L (ref 3.5–5.5)
PROT SERPL-MCNC: 7.2 G/DL (ref 5.7–8.2)
RBC # BLD AUTO: 4.5 10*6/MM3 (ref 3.89–5.14)
SODIUM BLD-SCNC: 135 MMOL/L (ref 132–146)
TROPONIN I SERPL-MCNC: 0 NG/ML (ref 0–0.07)
TROPONIN I SERPL-MCNC: 0 NG/ML (ref 0–0.07)
WBC NRBC COR # BLD: 7.62 10*3/MM3 (ref 3.5–10.8)
WHOLE BLOOD HOLD SPECIMEN: NORMAL
WHOLE BLOOD HOLD SPECIMEN: NORMAL

## 2019-01-08 PROCEDURE — 84484 ASSAY OF TROPONIN QUANT: CPT

## 2019-01-08 PROCEDURE — 84484 ASSAY OF TROPONIN QUANT: CPT | Performed by: NURSE PRACTITIONER

## 2019-01-08 PROCEDURE — 99220 PR INITIAL OBSERVATION CARE/DAY 70 MINUTES: CPT | Performed by: INTERNAL MEDICINE

## 2019-01-08 PROCEDURE — G0378 HOSPITAL OBSERVATION PER HR: HCPCS

## 2019-01-08 PROCEDURE — 83690 ASSAY OF LIPASE: CPT

## 2019-01-08 PROCEDURE — 99285 EMERGENCY DEPT VISIT HI MDM: CPT

## 2019-01-08 PROCEDURE — 85025 COMPLETE CBC W/AUTO DIFF WBC: CPT

## 2019-01-08 PROCEDURE — 83880 ASSAY OF NATRIURETIC PEPTIDE: CPT

## 2019-01-08 PROCEDURE — 71275 CT ANGIOGRAPHY CHEST: CPT

## 2019-01-08 PROCEDURE — 80053 COMPREHEN METABOLIC PANEL: CPT

## 2019-01-08 PROCEDURE — 0 IOPAMIDOL PER 1 ML: Performed by: EMERGENCY MEDICINE

## 2019-01-08 PROCEDURE — 93005 ELECTROCARDIOGRAM TRACING: CPT | Performed by: EMERGENCY MEDICINE

## 2019-01-08 PROCEDURE — 85379 FIBRIN DEGRADATION QUANT: CPT | Performed by: NURSE PRACTITIONER

## 2019-01-08 PROCEDURE — 71046 X-RAY EXAM CHEST 2 VIEWS: CPT

## 2019-01-08 PROCEDURE — 93005 ELECTROCARDIOGRAM TRACING: CPT

## 2019-01-08 RX ORDER — ESTRADIOL 0.05 MG/D
1 PATCH TRANSDERMAL WEEKLY
Status: DISCONTINUED | OUTPATIENT
Start: 2019-01-09 | End: 2019-01-09 | Stop reason: HOSPADM

## 2019-01-08 RX ORDER — ASPIRIN 81 MG/1
81 TABLET ORAL DAILY
Status: DISCONTINUED | OUTPATIENT
Start: 2019-01-09 | End: 2019-01-09 | Stop reason: HOSPADM

## 2019-01-08 RX ORDER — SODIUM CHLORIDE 0.9 % (FLUSH) 0.9 %
3 SYRINGE (ML) INJECTION EVERY 12 HOURS SCHEDULED
Status: DISCONTINUED | OUTPATIENT
Start: 2019-01-09 | End: 2019-01-09 | Stop reason: HOSPADM

## 2019-01-08 RX ORDER — SODIUM CHLORIDE 0.9 % (FLUSH) 0.9 %
3-10 SYRINGE (ML) INJECTION AS NEEDED
Status: DISCONTINUED | OUTPATIENT
Start: 2019-01-08 | End: 2019-01-09 | Stop reason: HOSPADM

## 2019-01-08 RX ORDER — LISINOPRIL 10 MG/1
10 TABLET ORAL DAILY
Status: DISCONTINUED | OUTPATIENT
Start: 2019-01-09 | End: 2019-01-09 | Stop reason: HOSPADM

## 2019-01-08 RX ORDER — ASPIRIN 81 MG/1
324 TABLET, CHEWABLE ORAL ONCE
Status: COMPLETED | OUTPATIENT
Start: 2019-01-08 | End: 2019-01-08

## 2019-01-08 RX ORDER — SODIUM CHLORIDE 0.9 % (FLUSH) 0.9 %
10 SYRINGE (ML) INJECTION AS NEEDED
Status: DISCONTINUED | OUTPATIENT
Start: 2019-01-08 | End: 2019-01-09 | Stop reason: HOSPADM

## 2019-01-08 RX ORDER — SODIUM CHLORIDE 0.9 % (FLUSH) 0.9 %
3-10 SYRINGE (ML) INJECTION AS NEEDED
Status: DISCONTINUED | OUTPATIENT
Start: 2019-01-08 | End: 2019-01-08

## 2019-01-08 RX ADMIN — ASPIRIN 81 MG 324 MG: 81 TABLET ORAL at 21:56

## 2019-01-08 RX ADMIN — IOPAMIDOL 60 ML: 755 INJECTION, SOLUTION INTRAVENOUS at 21:14

## 2019-01-09 ENCOUNTER — APPOINTMENT (OUTPATIENT)
Dept: CARDIOLOGY | Facility: HOSPITAL | Age: 74
End: 2019-01-09

## 2019-01-09 VITALS
DIASTOLIC BLOOD PRESSURE: 65 MMHG | HEART RATE: 78 BPM | OXYGEN SATURATION: 100 % | TEMPERATURE: 97.8 F | WEIGHT: 130 LBS | BODY MASS INDEX: 20.4 KG/M2 | RESPIRATION RATE: 18 BRPM | SYSTOLIC BLOOD PRESSURE: 132 MMHG | HEIGHT: 67 IN

## 2019-01-09 LAB
ANION GAP SERPL CALCULATED.3IONS-SCNC: 5 MMOL/L (ref 3–11)
BASOPHILS # BLD AUTO: 0.03 10*3/MM3 (ref 0–0.2)
BASOPHILS NFR BLD AUTO: 0.5 % (ref 0–1)
BH CV STRESS BP STAGE 1: NORMAL
BH CV STRESS BP STAGE 2: NORMAL
BH CV STRESS BP STAGE 3: NORMAL
BH CV STRESS DURATION MIN STAGE 1: 3
BH CV STRESS DURATION MIN STAGE 2: 3
BH CV STRESS DURATION MIN STAGE 3: 1
BH CV STRESS DURATION SEC STAGE 1: 0
BH CV STRESS DURATION SEC STAGE 2: 0
BH CV STRESS DURATION SEC STAGE 3: 31
BH CV STRESS GRADE STAGE 1: 10
BH CV STRESS GRADE STAGE 2: 12
BH CV STRESS GRADE STAGE 3: 14
BH CV STRESS HR STAGE 1: 100
BH CV STRESS HR STAGE 2: 123
BH CV STRESS HR STAGE 3: 137
BH CV STRESS METS STAGE 1: 5
BH CV STRESS METS STAGE 2: 7.5
BH CV STRESS METS STAGE 3: 10
BH CV STRESS O2 STAGE 1: 95
BH CV STRESS O2 STAGE 2: 98
BH CV STRESS O2 STAGE 3: 99
BH CV STRESS PROTOCOL 1: NORMAL
BH CV STRESS RECOVERY BP: NORMAL MMHG
BH CV STRESS RECOVERY HR: 82 BPM
BH CV STRESS SPEED STAGE 1: 1.7
BH CV STRESS SPEED STAGE 2: 2.5
BH CV STRESS SPEED STAGE 3: 3.4
BH CV STRESS STAGE 1: 1
BH CV STRESS STAGE 2: 2
BH CV STRESS STAGE 3: 3
BUN BLD-MCNC: 15 MG/DL (ref 9–23)
BUN/CREAT SERPL: 27.8 (ref 7–25)
CALCIUM SPEC-SCNC: 8.7 MG/DL (ref 8.7–10.4)
CHLORIDE SERPL-SCNC: 107 MMOL/L (ref 99–109)
CO2 SERPL-SCNC: 27 MMOL/L (ref 20–31)
CREAT BLD-MCNC: 0.54 MG/DL (ref 0.6–1.3)
DEPRECATED RDW RBC AUTO: 44.8 FL (ref 37–54)
EOSINOPHIL # BLD AUTO: 0.23 10*3/MM3 (ref 0–0.3)
EOSINOPHIL NFR BLD AUTO: 3.5 % (ref 0–3)
ERYTHROCYTE [DISTWIDTH] IN BLOOD BY AUTOMATED COUNT: 13.8 % (ref 11.3–14.5)
GFR SERPL CREATININE-BSD FRML MDRD: 111 ML/MIN/1.73
GLUCOSE BLD-MCNC: 73 MG/DL (ref 70–100)
HCT VFR BLD AUTO: 36.5 % (ref 34.5–44)
HGB BLD-MCNC: 11.5 G/DL (ref 11.5–15.5)
IMM GRANULOCYTES # BLD AUTO: 0.01 10*3/MM3 (ref 0–0.03)
IMM GRANULOCYTES NFR BLD AUTO: 0.2 % (ref 0–0.6)
LV EF NUC BP: 70 %
LYMPHOCYTES # BLD AUTO: 1.91 10*3/MM3 (ref 0.6–4.8)
LYMPHOCYTES NFR BLD AUTO: 29.3 % (ref 24–44)
MAXIMAL PREDICTED HEART RATE: 148 BPM
MCH RBC QN AUTO: 27.8 PG (ref 27–31)
MCHC RBC AUTO-ENTMCNC: 31.5 G/DL (ref 32–36)
MCV RBC AUTO: 88.4 FL (ref 80–99)
MONOCYTES # BLD AUTO: 0.56 10*3/MM3 (ref 0–1)
MONOCYTES NFR BLD AUTO: 8.6 % (ref 0–12)
NEUTROPHILS # BLD AUTO: 3.78 10*3/MM3 (ref 1.5–8.3)
NEUTROPHILS NFR BLD AUTO: 58.1 % (ref 41–71)
PERCENT MAX PREDICTED HR: 93.92 %
PLATELET # BLD AUTO: 372 10*3/MM3 (ref 150–450)
PMV BLD AUTO: 9.7 FL (ref 6–12)
POTASSIUM BLD-SCNC: 3.9 MMOL/L (ref 3.5–5.5)
RBC # BLD AUTO: 4.13 10*6/MM3 (ref 3.89–5.14)
SODIUM BLD-SCNC: 139 MMOL/L (ref 132–146)
STRESS BASELINE BP: NORMAL MMHG
STRESS BASELINE HR: 71 BPM
STRESS O2 SAT REST: 96 %
STRESS PERCENT HR: 110 %
STRESS POST ESTIMATED WORKLOAD: 8.3 METS
STRESS POST EXERCISE DUR MIN: 7 MIN
STRESS POST EXERCISE DUR SEC: 31 SEC
STRESS POST O2 SAT PEAK: 99 %
STRESS POST PEAK BP: NORMAL MMHG
STRESS POST PEAK HR: 139 BPM
STRESS TARGET HR: 126 BPM
TROPONIN I SERPL-MCNC: 0.01 NG/ML
WBC NRBC COR # BLD: 6.51 10*3/MM3 (ref 3.5–10.8)

## 2019-01-09 PROCEDURE — G0378 HOSPITAL OBSERVATION PER HR: HCPCS

## 2019-01-09 PROCEDURE — 78452 HT MUSCLE IMAGE SPECT MULT: CPT | Performed by: INTERNAL MEDICINE

## 2019-01-09 PROCEDURE — 84484 ASSAY OF TROPONIN QUANT: CPT | Performed by: NURSE PRACTITIONER

## 2019-01-09 PROCEDURE — 99217 PR OBSERVATION CARE DISCHARGE MANAGEMENT: CPT | Performed by: INTERNAL MEDICINE

## 2019-01-09 PROCEDURE — A9500 TC99M SESTAMIBI: HCPCS | Performed by: NURSE PRACTITIONER

## 2019-01-09 PROCEDURE — 93017 CV STRESS TEST TRACING ONLY: CPT

## 2019-01-09 PROCEDURE — 93018 CV STRESS TEST I&R ONLY: CPT | Performed by: INTERNAL MEDICINE

## 2019-01-09 PROCEDURE — 80048 BASIC METABOLIC PNL TOTAL CA: CPT | Performed by: NURSE PRACTITIONER

## 2019-01-09 PROCEDURE — 0 TECHNETIUM SESTAMIBI: Performed by: NURSE PRACTITIONER

## 2019-01-09 PROCEDURE — 93010 ELECTROCARDIOGRAM REPORT: CPT | Performed by: INTERNAL MEDICINE

## 2019-01-09 PROCEDURE — 78452 HT MUSCLE IMAGE SPECT MULT: CPT

## 2019-01-09 PROCEDURE — 93005 ELECTROCARDIOGRAM TRACING: CPT | Performed by: INTERNAL MEDICINE

## 2019-01-09 PROCEDURE — 85025 COMPLETE CBC W/AUTO DIFF WBC: CPT | Performed by: NURSE PRACTITIONER

## 2019-01-09 RX ADMIN — ASPIRIN 81 MG: 81 TABLET, COATED ORAL at 11:17

## 2019-01-09 RX ADMIN — TECHNETIUM TC 99M SESTAMIBI 1 DOSE: 1 INJECTION INTRAVENOUS at 07:55

## 2019-01-09 RX ADMIN — TECHNETIUM TC 99M SESTAMIBI 1 DOSE: 1 INJECTION INTRAVENOUS at 09:45

## 2019-01-09 RX ADMIN — Medication 3 ML: at 02:29

## 2019-01-09 RX ADMIN — LISINOPRIL 10 MG: 10 TABLET ORAL at 11:17

## 2019-01-09 NOTE — H&P
[facility] Medicine Services  HISTORY AND PHYSICAL    Patient Name: Roxie Bruce  : 1945  MRN: [id number]  Primary Care Physician: Mariangel Holt PA-C  Date of admission: 2019      Subjective   Subjective     Chief Complaint:  Chest pain    HPI:  Roxie Bruce is a 73 y.o. female with past medical history significant for mitral valve prolapse, hypertension and arthritis.  She presents to [facility] today with c/o left side chest pain.  She states pain had a sudden onset at 1445 this afternoon.  She was sitting down looking at her IPad when the pain started.  She states it was dull ache with periods of sharp pain.  It was worse with deep breathing.  Pain resolved suddenly around 1800 when she was in the ED.  She denies shortness of breath, nausea, vomiting, cough, headache, diaphoresis, abdominal pain or any other acute symptoms.  Patient is a non smoker and reports going to the gym daily.  In ED, labs were unremarkable except D-dimer was elevated.  CTA chest was negative for PE.  Troponin 0.00.  EKG was normal.  Patient was given aspirin 324 mg in ED.  Patient will be admitted under observation by the hospital medicine service for further evaluation and work up.      Review of Systems   Constitutional: Negative for chills, diaphoresis, fatigue and fever.   HENT: Negative.    Respiratory: Negative for cough and shortness of breath.    Cardiovascular: Positive for chest pain. Negative for leg swelling.   Gastrointestinal: Negative.    Genitourinary: Negative.    Musculoskeletal: Negative.    Skin: Negative.    Neurological: Negative for dizziness, weakness and light-headedness.   Psychiatric/Behavioral: Negative.         Otherwise 10-system ROS reviewed and is negative except as mentioned in the HPI.    Personal History     Past Medical History:   Diagnosis Date   • Arthritis ? Years ago    Osteoarthritis   • Colon polyp ?    Removed during 2 colonoscopies   •  Diverticulosis ?   • Heart murmur ?    Mitro valve prolapse   • Hypertension        Past Surgical History:   Procedure Laterality Date   • BREAST BIOPSY Left     x2 20-30 years ago    • COLONOSCOPY  04/20/2016    Most recent one.   • EYE SURGERY      Cataract   • HYSTERECTOMY     • OOPHORECTOMY         Family History: family history is not on file. Otherwise pertinent FHx was reviewed and unremarkable.     Social History:  reports that  has never smoked. she has never used smokeless tobacco. She reports that she drinks alcohol. She reports that she does not use drugs.  Social History     Social History Narrative   • Not on file       Medications:    Available home medication information reviewed.  Medications Prior to Admission   Medication Sig Dispense Refill Last Dose   • aspirin (ECOTRIN LOW STRENGTH) 81 MG EC tablet    Taking   • Calcium Carbonate (CALTRATE 600 PO) Take 1 capsule by mouth Daily.   Taking   • cholecalciferol (VITAMIN D3) 1000 UNITS tablet Take 1,000 Units by mouth Daily.   Taking   • estradiol (CLIMARA) 0.05 MG/24HR patch Place 1 patch on the skin as directed by provider 1 (One) Time Per Week.   Taking   • FOLIC ACID PO Take 1 tablet/day by mouth.   Taking   • ibuprofen (ADVIL) 200 MG tablet    Taking   • lisinopril (PRINIVIL,ZESTRIL) 10 MG tablet TAKE 1 TABLET DAILY 90 tablet 1 Taking   • Misc. Devices (NASAL SPRAY BOTTLE) misc 1 spray into each nostril Daily.   Taking   • psyllium (METAMUCIL) 58.6 % packet Take 1 packet by mouth 2 (Two) Times a Day.   Taking       No Known Allergies    Objective   Objective     Vital Signs:   Temp:  [97.8 °F (36.6 °C)-98.1 °F (36.7 °C)] 97.8 °F (36.6 °C)  Heart Rate:  [61-75] 75  Resp:  [16-20] 20  BP: (143-176)/(69-77) 160/72        Physical Exam   Constitutional: No acute distress, awake, alert  HENT: NCAT, mucous membranes moist  Neck: Supple, no thyromegaly, no lymphadenopathy, trachea midline  Respiratory: Clear to auscultation bilaterally, nonlabored  respirations   Cardiovascular: RRR, no murmurs, rubs, or gallops, palpable pedal pulses bilaterally  Gastrointestinal: Positive bowel sounds, soft, nontender, nondistended  Musculoskeletal: No bilateral ankle edema, no clubbing or cyanosis to extremities  Psychiatric: Appropriate affect, cooperative  Neurologic: Oriented x 3, strength symmetric in all extremities, Cranial Nerves grossly intact to confrontation, speech clear  Skin: No rashes    Results Reviewed:  I have personally reviewed current lab, radiology, and data and agree.    Results from last 7 days   Lab Units  01/08/19   1754   WBC 10*3/mm3  7.62   HEMOGLOBIN g/dL  12.8   HEMATOCRIT %  39.8   PLATELETS 10*3/mm3  412     Results from last 7 days   Lab Units  01/08/19 2024 01/08/19   1754   SODIUM mmol/L   --   135   POTASSIUM mmol/L   --   4.0   CHLORIDE mmol/L   --   100   CO2 mmol/L   --   29.0   BUN mg/dL   --   19   CREATININE mg/dL   --   0.74   GLUCOSE mg/dL   --   92   CALCIUM mg/dL   --   9.5   ALT (SGPT) U/L   --   19   AST (SGOT) U/L   --   22   TROPONIN I ng/mL  0.00   --      Estimated Creatinine Clearance: 59.2 mL/min (by C-G formula based on SCr of 0.74 mg/dL).  Brief Urine Lab Results     None        BNP   Date Value Ref Range Status   01/08/2019 70.0 0.0 - 100.0 pg/mL Final     Comment:     Results may be falsely decreased if patient taking Biotin.     Imaging Results (last 24 hours)     Procedure Component Value Units Date/Time    CT Angiogram Chest With Contrast [[id number]] Collected:  01/08/19 2106     Updated:  01/08/19 2133    Narrative:       EXAM:  CT Angiography Chest With Contrast    EXAM DATE/TIME:  1/8/2019 9:06 PM    CLINICAL HISTORY:  73 years old, female; Pain and signs and symptoms; Dyspnea   and shortness of breath; Chest pain; Left-sided chest pain; Additional info:   Chest pain, SOA, R/O pe, dyspnea    TECHNIQUE:  Axial computed tomographic angiography images of the chest with   intravenous contrast using CT  angiography protocol.  All CT scans at this facility use at least one of these dose optimization   techniques: automated exposure control; mA and/or kV adjustment per patient   size (includes targeted exams where dose is matched to clinical indication); or   iterative reconstruction.  MIP reconstructed images were created and reviewed.    COMPARISON:  CR XR CHEST 2 VW 1/8/2019 6:31 PM    FINDINGS:    No focal pulmonary artery filling defect to suggest acute pulmonary embolus.   Thoracic aorta is tortuous without focal aneurysm or dissection.     No enlarged mediastinal lymph nodes.   No pleural effusion or pneumothorax.   Pulmonary vascular/interstitial pattern does not suggest active pulmonary   edema.     No suspicious lung mass or air space process.   No central endobronchial lesion.     Limited visualization of upper abdomen shows no concerning finding.   Bony structures show no acute fracture or destructive process.   Small hiatal hernia is present.         Impression:       No evidence of acute pulmonary embolus.     No other acute or concerning focal intrathoracic abnormality.     Small hiatal hernia measuring 2 cm. This can be associated with chest pain in   the setting of GE reflux        THIS DOCUMENT HAS BEEN ELECTRONICALLY SIGNED BY ITALIA PERAZA MD    XR Chest 2 View [[id number]] Collected:  01/08/19 1821     Updated:  01/08/19 1901    Narrative:       EXAM:    XR Chest, 2 Views     EXAM DATE/TIME:    1/8/2019 6:21 PM     CLINICAL HISTORY:    73 years old, female; Pain; Other: Chest pain; Additional info: Chest pain   protocol     TECHNIQUE:    XR of the chest, 2 views.     COMPARISON:    No relevant prior studies available.     FINDINGS:    Lungs:  Degree of inflation of the lungs is normal. No evidence of pulmonary   edema. No focal airspace process. No concerning parenchymal lung mass.    Pleural space:  No pleural effusion or pneumothorax.    Heart/Mediastinum:  Cardiac silhouette appears normal.  No mediastinal   adenopathy or hilar mass.    Bones/joints:  Osseous structures show no acute or concerning abnormality.       Impression:       No active or focal cardiopulmonary process.     THIS DOCUMENT HAS BEEN ELECTRONICALLY SIGNED BY ITALIA PERAZA MD             Assessment/Plan   Assessment / Plan     Active Hospital Problems    Diagnosis Date Noted   • **Chest pain [R07.9] 01/08/2019   • Essential hypertension [I10] 03/30/2017         Chest pain  --troponin 0.00, continue to trend  --EKG in AM  --received ASA in ED  --stress test in AM    Hypertension  --continue lisinopril 10 mg   --monitor, may need to increase medication dose    DVT prophylaxis: heparin    CODE STATUS:    Code Status and Medical Interventions:   Ordered at: 01/08/19 4044     Level Of Support Discussed With:    Patient     Code Status:    CPR     Medical Interventions (Level of Support Prior to Arrest):    Full       Admission Status:  I believe this patient meets OBSERVATION status, however if further evaluation or treatment plans warrant, status may change.  Based upon current information, I predict patient's care encounter to be less than or equal to 2 midnights.      Electronically signed by BROOKS Wagoner, 01/08/19, 10:07 PM.    Brief Attending Admission Attestation     I have seen and examined the patient, performing an independent face-to-face diagnostic evaluation with plan of care reviewed and developed with the advanced practice clinician (APC).      Brief Summary Statement/HPI:   Roxie Bruce is a 73 y.o. female history of benign hypertension, mitral valve prolapse and unspecified arthritis.  Patient presented to the ER with complaint of substernal chest pain which started at about 2:45 PM this afternoon and lasted about 3 hours.  Chest pain resolved spontaneously after that period.  Patient reports that she was at rest when the pain started and she describes pain as a dull ache with intermittent episodes of sharp  pain.  She is a lifelong nonsmoker, nondiabetic and very athletic.  No other symptoms reported including nausea, vomiting, diaphoresis, abdominal pain, fever or chills.  Initial troponin ×2 were negative and EKG does not suggest any acute abnormalities.  CT of pulmonary and is negative for PE.  Patient has remained asymptomatic.  We'll scheduled exercise stress test for tomorrow morning.    Attending Physical Exam:  Constitutional: No acute distress, awake, alert  Eyes: PERRLA, sclerae anicteric, no conjunctival injection  HENT: NCAT, mucous membranes moist  Neck: Supple, no thyromegaly, no lymphadenopathy, trachea midline  Respiratory: Clear to auscultation bilaterally, nonlabored respirations   Cardiovascular: RRR, no murmurs, rubs, or gallops, palpable pedal pulses bilaterally  Gastrointestinal: Positive bowel sounds, soft, nontender, nondistended  Musculoskeletal: No bilateral ankle edema, no clubbing or cyanosis to extremities  Psychiatric: Appropriate affect, cooperative  Neurologic: Oriented x 3, strength symmetric in all extremities, Cranial Nerves grossly intact, speech clear  Skin: No rashes      Brief Assessment/Plan :  See above for further detailed assessment and plan developed with APC which I have reviewed and/or edited.      Electronically signed by Neal Cano MD, 01/08/19, 11:51 PM.

## 2019-01-09 NOTE — ED PROVIDER NOTES
Subjective   Ms. Roxie Bruce is a 73 y.o. female who presents to the ED with c/o CP. She reports that around 1445 she was getting ready to  her grandson when she suddenly developed left sided CP and SOA. She is unable to describe the quality of her pain but she indicates that her pain worsens with deep breathing. Her pain lasted for around 3 hours before suddenly resolving. She denies any nausea, abdominal pain, or coughing during the episode. She also denies any recent travel or immobilization. She has a hx of a mitral valve prolapse, HTN, diverticulosis, and arthritis. No other acute complaints at this time.        History provided by:  Patient  Chest Pain   Pain location:  L chest  Pain severity:  Moderate  Onset quality:  Sudden  Duration:  3 hours  Timing:  Constant  Progression:  Resolved  Chronicity:  New  Associated symptoms: shortness of breath    Associated symptoms: no abdominal pain, no cough, no dizziness, no fever, no nausea, no vomiting and no weakness        Review of Systems   Constitutional: Negative for chills and fever.   Respiratory: Positive for shortness of breath. Negative for cough.    Cardiovascular: Positive for chest pain.   Gastrointestinal: Negative for abdominal pain, nausea and vomiting.   Neurological: Negative for dizziness and weakness.   All other systems reviewed and are negative.      Past Medical History:   Diagnosis Date   • Arthritis ? Years ago    Osteoarthritis   • Colon polyp ?    Removed during 2 colonoscopies   • Diverticulosis ?   • Heart murmur ?    Mitro valve prolapse   • Hypertension        No Known Allergies    Past Surgical History:   Procedure Laterality Date   • BREAST BIOPSY Left     x2 20-30 years ago    • COLONOSCOPY  04/20/2016    Most recent one.   • EYE SURGERY      Cataract   • HYSTERECTOMY     • OOPHORECTOMY         Family History   Problem Relation Age of Onset   • Breast cancer Neg Hx    • Endometrial cancer Neg Hx    • Ovarian cancer Neg  Hx        Social History     Socioeconomic History   • Marital status:      Spouse name: Not on file   • Number of children: Not on file   • Years of education: Not on file   • Highest education level: Not on file   Tobacco Use   • Smoking status: Never Smoker   • Smokeless tobacco: Never Used   Substance and Sexual Activity   • Alcohol use: Yes     Types: 1 - 2 Glasses of wine per week     Comment: rarely   • Drug use: No   • Sexual activity: Yes     Partners: Male         Objective   Physical Exam   Constitutional: She is oriented to person, place, and time. She appears well-developed and well-nourished. She is cooperative.  Non-toxic appearance. She does not appear ill.   HENT:   Head: Normocephalic and atraumatic.   Eyes: Conjunctivae, EOM and lids are normal. Pupils are equal, round, and reactive to light.   Neck: Trachea normal, normal range of motion and full passive range of motion without pain. Neck supple.   Cardiovascular: Normal rate, regular rhythm, normal heart sounds, intact distal pulses and normal pulses.   Pulmonary/Chest: Effort normal and breath sounds normal. No respiratory distress. She has no decreased breath sounds. She has no wheezes. She has no rhonchi. She has no rales.   Abdominal: Soft. Normal appearance and bowel sounds are normal. There is no tenderness.   Musculoskeletal: Normal range of motion.        Right lower leg: Normal. She exhibits no tenderness and no edema.        Left lower leg: Normal. She exhibits no tenderness and no edema.   Neurological: She is alert and oriented to person, place, and time. She has normal strength. No cranial nerve deficit.   Skin: Skin is warm, dry and intact. No rash noted.   Psychiatric: She has a normal mood and affect. Her speech is normal and behavior is normal.   Nursing note and vitals reviewed.      Procedures         ED Course  ED Course as of Jan 08 2221   Tue Jan 08, 2019 2053 D-dimer, Quant: (!) 3.58 [KG]   2135 Patient's had 2  negative troponins, 2 unremarkable EKGs.  Patient's d-dimer was positive.  Patient had a negative PE study.  CT angiogram did reveal a hiatal hernia.  After discussing results with the patient.  Patient has a history of hypertension, MVP, significant family history of cardiac disease.  Patient has never had a stress test.  It is suggested that the patient be admitted to the hospital for continued cardiac monitoring, further evaluation with possible stress.  Patient agrees with treatment plan.  [KG]   1518 Dr. Cano contacted and agrees to admit the patient at this time.    [KG]      ED Course User Index  [KG] Priscila Perez, APRN         Recent Results (from the past 24 hour(s))   POC Troponin, Rapid    Collection Time: 01/08/19  5:53 PM   Result Value Ref Range    Troponin I 0.00 0.00 - 0.07 ng/mL   Comprehensive Metabolic Panel    Collection Time: 01/08/19  5:54 PM   Result Value Ref Range    Glucose 92 70 - 100 mg/dL    BUN 19 9 - 23 mg/dL    Creatinine 0.74 0.60 - 1.30 mg/dL    Sodium 135 132 - 146 mmol/L    Potassium 4.0 3.5 - 5.5 mmol/L    Chloride 100 99 - 109 mmol/L    CO2 29.0 20.0 - 31.0 mmol/L    Calcium 9.5 8.7 - 10.4 mg/dL    Total Protein 7.2 5.7 - 8.2 g/dL    Albumin 4.58 3.20 - 4.80 g/dL    ALT (SGPT) 19 7 - 40 U/L    AST (SGOT) 22 0 - 33 U/L    Alkaline Phosphatase 91 25 - 100 U/L    Total Bilirubin 0.5 0.3 - 1.2 mg/dL    eGFR Non African Amer 77 >60 mL/min/1.73    Globulin 2.6 gm/dL    A/G Ratio 1.7 1.5 - 2.5 g/dL    BUN/Creatinine Ratio 25.7 (H) 7.0 - 25.0    Anion Gap 6.0 3.0 - 11.0 mmol/L   Lipase    Collection Time: 01/08/19  5:54 PM   Result Value Ref Range    Lipase 43 6 - 51 U/L   BNP    Collection Time: 01/08/19  5:54 PM   Result Value Ref Range    BNP 70.0 0.0 - 100.0 pg/mL   Light Blue Top    Collection Time: 01/08/19  5:54 PM   Result Value Ref Range    Extra Tube hold for add-on    Green Top (Gel)    Collection Time: 01/08/19  5:54 PM   Result Value Ref Range    Extra Tube Hold for  add-ons.    Lavender Top    Collection Time: 01/08/19  5:54 PM   Result Value Ref Range    Extra Tube hold for add-on    Gold Top - SST    Collection Time: 01/08/19  5:54 PM   Result Value Ref Range    Extra Tube Hold for add-ons.    CBC Auto Differential    Collection Time: 01/08/19  5:54 PM   Result Value Ref Range    WBC 7.62 3.50 - 10.80 10*3/mm3    RBC 4.50 3.89 - 5.14 10*6/mm3    Hemoglobin 12.8 11.5 - 15.5 g/dL    Hematocrit 39.8 34.5 - 44.0 %    MCV 88.4 80.0 - 99.0 fL    MCH 28.4 27.0 - 31.0 pg    MCHC 32.2 32.0 - 36.0 g/dL    RDW 13.7 11.3 - 14.5 %    RDW-SD 44.7 37.0 - 54.0 fl    MPV 9.4 6.0 - 12.0 fL    Platelets 412 150 - 450 10*3/mm3    Neutrophil % 58.2 41.0 - 71.0 %    Lymphocyte % 32.9 24.0 - 44.0 %    Monocyte % 5.5 0.0 - 12.0 %    Eosinophil % 3.1 (H) 0.0 - 3.0 %    Basophil % 0.3 0.0 - 1.0 %    Immature Grans % 0.3 0.0 - 0.6 %    Neutrophils, Absolute 4.43 1.50 - 8.30 10*3/mm3    Lymphocytes, Absolute 2.51 0.60 - 4.80 10*3/mm3    Monocytes, Absolute 0.42 0.00 - 1.00 10*3/mm3    Eosinophils, Absolute 0.24 0.00 - 0.30 10*3/mm3    Basophils, Absolute 0.02 0.00 - 0.20 10*3/mm3    Immature Grans, Absolute 0.02 0.00 - 0.03 10*3/mm3   D-dimer, Quantitative    Collection Time: 01/08/19  5:54 PM   Result Value Ref Range    D-Dimer, Quantitative 3.58 (H) 0.00 - 0.50 mg/L (FEU)   POC Troponin, Rapid    Collection Time: 01/08/19  8:24 PM   Result Value Ref Range    Troponin I 0.00 0.00 - 0.07 ng/mL     Note: In addition to lab results from this visit, the labs listed above may include labs taken at another facility or during a different encounter within the last 24 hours. Please correlate lab times with ED admission and discharge times for further clarification of the services performed during this visit.    CT Angiogram Chest With Contrast   Final Result   No evidence of acute pulmonary embolus.       No other acute or concerning focal intrathoracic abnormality.       Small hiatal hernia measuring 2 cm.  This can be associated with chest pain in    the setting of GE reflux            THIS DOCUMENT HAS BEEN ELECTRONICALLY SIGNED BY ITALIA PERAZA MD      XR Chest 2 View   Final Result   No active or focal cardiopulmonary process.       THIS DOCUMENT HAS BEEN ELECTRONICALLY SIGNED BY ITALIA PERAZA MD        Vitals:    01/08/19 2100 01/08/19 2101 01/08/19 2158 01/08/19 2159   BP: 143/74  154/74    Pulse:  61  71   Resp:       Temp:       SpO2:  99%  99%   Weight:       Height:         Medications   sodium chloride 0.9 % flush 10 mL (not administered)   iopamidol (ISOVUE-370) 76 % injection 100 mL (60 mL Intravenous Given 1/8/19 2114)   aspirin chewable tablet 324 mg (324 mg Oral Given 1/8/19 2156)     ECG/EMG Results (last 24 hours)     Procedure Component Value Units Date/Time    ECG 12 Lead [[id number]] Collected:  01/08/19 1752     Updated:  01/08/19 2144    Narrative:       Test Reason : chest pain  Blood Pressure : **/** mmHG  Vent. Rate : 071 BPM     Atrial Rate : 071 BPM     P-R Int : 190 ms          QRS Dur : 082 ms      QT Int : 412 ms       P-R-T Axes : 087 070 070 degrees     QTc Int : 447 ms    ** Poor data quality, interpretation may be adversely affected  Normal sinus rhythm  Normal ECG  No previous ECGs available  Confirmed by RONAL LOBATO MD ([id number]) on 1/8/2019 9:44:27 PM    Referred By:  JOSELINE           Confirmed By:RONAL LOBATO MD    ECG 12 Lead [[id number]] Collected:  01/08/19 2018     Updated:  01/08/19 2144    Narrative:       Test Reason : SECOND SET  Blood Pressure : **/** mmHG  Vent. Rate : 067 BPM     Atrial Rate : 067 BPM     P-R Int : 196 ms          QRS Dur : 100 ms      QT Int : 426 ms       P-R-T Axes : 081 067 059 degrees     QTc Int : 450 ms    Normal sinus rhythm  Normal ECG  When compared with ECG of 08-JAN-2019 17:52, (Unconfirmed)  No significant change was found  Confirmed by RONAL LOBATO MD ([id number]) on 1/8/2019 9:44:30 PM    Referred By:  dangelo           Confirmed By:RONAL  LUIS ALFREDO IVAN              HEART Score (for prediction of 6-week risk of major adverse cardiac event) reviewed and/or performed as part of the patient evaluation and treatment planning process.  The result associated with this review/performance is: 4           MDM    Final diagnoses:   Chest pain, unspecified type       Documentation assistance provided by jackie Garcia.  Information recorded by the scribe was done at my direction and has been verified and validated by me.     Carlita Garcia  01/08/19 2029       Priscila Perez, BROOKS  01/08/19 0512

## 2019-01-09 NOTE — PLAN OF CARE
Problem: Patient Care Overview  Goal: Plan of Care Review  Outcome: Ongoing (interventions implemented as appropriate)   01/09/19 0036 01/09/19 0622   Plan of Care Review   Progress --  improving   OTHER   Outcome Summary pt came from ED pt up ad anamaria VSS no complaints of pain will continue to monitor --    Coping/Psychosocial   Plan of Care Reviewed With --  patient

## 2019-01-09 NOTE — DISCHARGE SUMMARY
[facility] Medicine Services  DISCHARGE SUMMARY    Patient Name: Roxie Bruce  : 1945  MRN: [id number]    Date of Admission: 2019  Date of Discharge:  2019  Primary Care Physician: Justus Esquivel MD    Consults     No orders found for last 30 day(s).          Hospital Course     Presenting Problem:   Chest pain, unspecified type [R07.9]    Active Hospital Problems    Diagnosis Date Noted   • **Chest pain [R07.9] 2019   • Essential hypertension [I10] 2017      Resolved Hospital Problems   No resolved problems to display.          Hospital Course:  Roxie Bruce is a 73 y.o. female who walks on a treadmill daily and has no cardiac history.  She was sitting at home when she developed acute onset of left chest pain that was associated with inspiration.  It lasted three hours then abruptly stopped in the ED.     She was admitted for observation.  Troponins and EKG were reassuring.  A nuclear med stress test was negative, showing EF of 70%.  Her d-dimer was minimally elelavted and she had a CT-angiogram that showed no clot or parenchymal lung abnormality.  The pain has not returned.     She is discharged on her regular home meds.  She may go back to exercising as always.  She is being worked up for RA.       Discharge Follow Up Recommendations for labs/diagnostics:   - f/u per routine    Day of Discharge     HPI:   Pain free since admission.  No complaints.  Eager to leave; all smiles.      Review of Systems   No fever or chills  Eating well.   No cp or dyspnea     Otherwise ROS is negative except as mentioned in the HPI.    Vital Signs:   Temp:  [97.4 °F (36.3 °C)-98.1 °F (36.7 °C)] 97.8 °F (36.6 °C)  Heart Rate:  [56-78] 78  Resp:  [16-20] 18  BP: (121-176)/(60-77) 132/65     Physical Exam:  Constitutional: No acute distress, awake, alert.  All dressed and sitting on sofa with   Eyes: PERRLA, sclerae anicteric, no conjunctival  injection  HENT: NCAT, mucous membranes moist  Neck: Supple, trachea midline  Respiratory: Clear to auscultation bilaterally, nonlabored respirations   Cardiovascular: RRR, no murmurs, rubs, or gallops, palpable pedal pulses bilaterally  Gastrointestinal: Positive bowel sounds, soft, nontender, nondistended  Musculoskeletal: No bilateral ankle edema, no clubbing or cyanosis to extremities  Psychiatric: Appropriate affect, cooperative  Neurologic: Oriented x 3, strength symmetric in all extremities, Cranial Nerves grossly intact to confrontation, speech clear  Skin: No rashes      Pertinent  and/or Most Recent Results     Results from last 7 days   Lab Units  01/09/19   0509  01/08/19   1754   WBC 10*3/mm3  6.51  7.62   HEMOGLOBIN g/dL  11.5  12.8   HEMATOCRIT %  36.5  39.8   PLATELETS 10*3/mm3  372  412   SODIUM mmol/L  139  135   POTASSIUM mmol/L  3.9  4.0   CHLORIDE mmol/L  107  100   CO2 mmol/L  27.0  29.0   BUN mg/dL  15  19   CREATININE mg/dL  0.54*  0.74   GLUCOSE mg/dL  73  92   CALCIUM mg/dL  8.7  9.5     Results from last 7 days   Lab Units  01/08/19   1754   BILIRUBIN mg/dL  0.5   ALK PHOS U/L  91   ALT (SGPT) U/L  19   AST (SGOT) U/L  22           Invalid input(s): TG, LDLCALC, LDLREALC  Results from last 7 days   Lab Units  01/09/19   1154  01/09/19   0509  01/08/19   2348  01/08/19   2024  01/08/19   1754   BNP pg/mL   --    --    --    --   70.0   TROPONIN I ng/mL  0.011  0.007  0.011  0.00   --        Brief Urine Lab Results     None          Microbiology Results Abnormal     None          Imaging Results (all)     Procedure Component Value Units Date/Time    CT Angiogram Chest With Contrast [[id number]] Collected:  01/08/19 2106     Updated:  01/08/19 2133    Narrative:       EXAM:  CT Angiography Chest With Contrast    EXAM DATE/TIME:  1/8/2019 9:06 PM    CLINICAL HISTORY:  73 years old, female; Pain and signs and symptoms; Dyspnea   and shortness of breath; Chest pain; Left-sided chest pain;  Additional info:   Chest pain, SOA, R/O pe, dyspnea    TECHNIQUE:  Axial computed tomographic angiography images of the chest with   intravenous contrast using CT angiography protocol.  All CT scans at this facility use at least one of these dose optimization   techniques: automated exposure control; mA and/or kV adjustment per patient   size (includes targeted exams where dose is matched to clinical indication); or   iterative reconstruction.  MIP reconstructed images were created and reviewed.    COMPARISON:  CR XR CHEST 2 VW 1/8/2019 6:31 PM    FINDINGS:    No focal pulmonary artery filling defect to suggest acute pulmonary embolus.   Thoracic aorta is tortuous without focal aneurysm or dissection.     No enlarged mediastinal lymph nodes.   No pleural effusion or pneumothorax.   Pulmonary vascular/interstitial pattern does not suggest active pulmonary   edema.     No suspicious lung mass or air space process.   No central endobronchial lesion.     Limited visualization of upper abdomen shows no concerning finding.   Bony structures show no acute fracture or destructive process.   Small hiatal hernia is present.         Impression:       No evidence of acute pulmonary embolus.     No other acute or concerning focal intrathoracic abnormality.     Small hiatal hernia measuring 2 cm. This can be associated with chest pain in   the setting of GE reflux        THIS DOCUMENT HAS BEEN ELECTRONICALLY SIGNED BY ITALIA PERAZA MD    XR Chest 2 View [[id number]] Collected:  01/08/19 1821     Updated:  01/08/19 1901    Narrative:       EXAM:    XR Chest, 2 Views     EXAM DATE/TIME:    1/8/2019 6:21 PM     CLINICAL HISTORY:    73 years old, female; Pain; Other: Chest pain; Additional info: Chest pain   protocol     TECHNIQUE:    XR of the chest, 2 views.     COMPARISON:    No relevant prior studies available.     FINDINGS:    Lungs:  Degree of inflation of the lungs is normal. No evidence of pulmonary   edema. No focal  airspace process. No concerning parenchymal lung mass.    Pleural space:  No pleural effusion or pneumothorax.    Heart/Mediastinum:  Cardiac silhouette appears normal. No mediastinal   adenopathy or hilar mass.    Bones/joints:  Osseous structures show no acute or concerning abnormality.       Impression:       No active or focal cardiopulmonary process.     THIS DOCUMENT HAS BEEN ELECTRONICALLY SIGNED BY ITALIA PERAZA MD          Interpretation Summary     · Excellent/above average exercise capacity with normal hemodynamic response to exercise.  · Expected exercise duration = 5:40 Actual - 7:25 FRANKLIN = (-29)  · Negative treadmill stress test for anginal chest pain or diagnostic ST segment changes at high workload and target heart rate.  · Myocardial perfusion imaging indicates a normal myocardial perfusion study with no evidence of ischemia.  · Left ventricular ejection fraction is normal (Calculated EF = 70%).  · Impressions are consistent with a low risk study.                        Discharge Details        Discharge Medications      Continue These Medications      Instructions Start Date   ADVIL 200 MG tablet  Generic drug:  ibuprofen   No dose, route, or frequency recorded.      CALTRATE 600 PO   1 capsule, Oral, Daily      cholecalciferol 1000 units tablet  Commonly known as:  VITAMIN D3   1,000 Units, Oral, Daily      ECOTRIN LOW STRENGTH 81 MG EC tablet  Generic drug:  aspirin   No dose, route, or frequency recorded.      estradiol 0.05 MG/24HR patch  Commonly known as:  CLIMARA   1 patch, Transdermal, Weekly      FOLIC ACID PO   1 tablet/day, Oral      lisinopril 10 MG tablet  Commonly known as:  PRINIVIL,ZESTRIL   TAKE 1 TABLET DAILY      Nasal Spray Bottle misc   1 spray, Nasal, Daily      psyllium 58.6 % packet  Commonly known as:  METAMUCIL   1 packet, Oral, 2 Times Daily             No Known Allergies      Discharge Disposition:  Home or Self Care    Discharge Diet:  Diet Order   Procedures   • Diet  Regular; Cardiac         Discharge Activity:  Routine.  Exercise as desired.     Special Instructions: If pain recurs, tell your doctor.    CODE STATUS:    Code Status and Medical Interventions:   Ordered at: 01/08/19 9664     Level Of Support Discussed With:    Patient     Code Status:    CPR     Medical Interventions (Level of Support Prior to Arrest):    Full       PCP and rheumatologist as scheduled.     Time Spent on Discharge:  45 minutes    Electronically signed by Pepper Sal MD, 01/09/19, 3:22 PM.

## 2019-01-09 NOTE — PROGRESS NOTES
Discharge Planning Assessment  [facility]     Patient Name: Roxie Bruce  MRN: [id number]  Today's Date: 1/9/2019    Admit Date: 1/8/2019    Discharge Needs Assessment     Row Name 01/09/19 1505       Living Environment    Lives With  spouse    Current Living Arrangements  home/apartment/condo    Primary Care Provided by  self    Provides Primary Care For  no one    Family Caregiver if Needed  spouse    Quality of Family Relationships  helpful;involved;supportive    Able to Return to Prior Arrangements  yes       Resource/Environmental Concerns    Resource/Environmental Concerns  none       Transition Planning    Patient/Family Anticipates Transition to  home with family    Patient/Family Anticipated Services at Transition  none    Transportation Anticipated  family or friend will provide       Discharge Needs Assessment    Readmission Within the Last 30 Days  no previous admission in last 30 days    Concerns to be Addressed  no discharge needs identified;denies needs/concerns at this time    Equipment Currently Used at Home  none    Equipment Needed After Discharge  none        Discharge Plan     Row Name 01/09/19 1505       Plan    Plan  Home    Patient/Family in Agreement with Plan  yes    Plan Comments  Met with patient and her  in the room to initiate discharge planning. Patient lives with her  in a home in [facility]. She is independent with ADLs and mobility and does not use any DME. Patient is normally very active. Her goal is home at discharge, and family will provide her ride. She does not anticipate any discharge needs at this time. Updated her PCP to Dr. Esquivel per her request. CM will continue to follow.     Final Discharge Disposition Code  01 - home or self-care        Destination      No service coordination in this encounter.      Durable Medical Equipment      No service coordination in this encounter.      Dialysis/Infusion      No service coordination in this  encounter.      Home Medical Care      No service coordination in this encounter.      Community Resources      No service coordination in this encounter.        Expected Discharge Date and Time     Expected Discharge Date Expected Discharge Time    Nima 10, 2019         Demographic Summary     Row Name 01/09/19 1504       General Information    Admission Type  observation    Referral Source  admission list    Reason for Consult  discharge planning    General Information Comments  PCP is Gavin Scanlon       Contact Information    Permission Granted to Share Info With  ;family/designee , Kiko Bruce        Functional Status     Row Name 01/09/19 1504       Functional Status    Usual Activity Tolerance  good       Functional Status, IADL    Medications  independent    Meal Preparation  independent    Housekeeping  independent    Laundry  independent    Shopping  independent       Employment/    Employment/ Comments  Confirmed with patient that she has medical and rx coverage through [facility] Medicare and is able to afford her medications.         Psychosocial    No documentation.       Abuse/Neglect    No documentation.       Legal    No documentation.       Substance Abuse    No documentation.       Patient Forms    No documentation.           Marizol Hooper

## 2019-01-09 NOTE — PLAN OF CARE
Problem: Patient Care Overview  Goal: Plan of Care Review  Outcome: Ongoing (interventions implemented as appropriate)   01/08/19 2355 01/09/19 0036   Plan of Care Review   Progress --  no change   OTHER   Outcome Summary --  pt came from ED pt up ad anamaria VSS no complaints of pain will continue to monitor   Coping/Psychosocial   Plan of Care Reviewed With patient --      Goal: Individualization and Mutuality  Outcome: Ongoing (interventions implemented as appropriate)    Goal: Discharge Needs Assessment  Outcome: Ongoing (interventions implemented as appropriate)   01/09/19 0000   Disability   Equipment Currently Used at Home none   Discharge Needs Assessment   Patient/Family Anticipates Transition to home;home with family   Patient/Family Anticipated Services at Transition    Transportation Concerns car, none   Transportation Anticipated family or friend will provide;car, drives self     Goal: Interprofessional Rounds/Family Conf  Outcome: Ongoing (interventions implemented as appropriate)   01/09/19 0036   Interdisciplinary Rounds/Family Conf   Participants ;pharmacy

## 2019-01-10 ENCOUNTER — TRANSITIONAL CARE MANAGEMENT TELEPHONE ENCOUNTER (OUTPATIENT)
Dept: FAMILY MEDICINE CLINIC | Facility: CLINIC | Age: 74
End: 2019-01-10

## 2019-01-10 NOTE — OUTREACH NOTE
JENNIFER call completed.  Please refer to TCM call flowsheet for call documentation.    Pt states doing very well today. She denies any further episodes of chest pain and has had no sob. She denies any questions, concerns, or needs. She states has an appt with rheumotalogy next tues for RA workup and confirms JENNIFER appt with Mariangel CARNEY 1/16/19.

## 2019-01-16 ENCOUNTER — OFFICE VISIT (OUTPATIENT)
Dept: FAMILY MEDICINE CLINIC | Facility: CLINIC | Age: 74
End: 2019-01-16

## 2019-01-16 VITALS
BODY MASS INDEX: 20.4 KG/M2 | HEIGHT: 67 IN | SYSTOLIC BLOOD PRESSURE: 152 MMHG | TEMPERATURE: 98 F | DIASTOLIC BLOOD PRESSURE: 70 MMHG | OXYGEN SATURATION: 99 % | WEIGHT: 130 LBS | HEART RATE: 76 BPM

## 2019-01-16 DIAGNOSIS — M05.9 RHEUMATOID ARTHRITIS WITH POSITIVE RHEUMATOID FACTOR, INVOLVING UNSPECIFIED SITE (HCC): ICD-10-CM

## 2019-01-16 DIAGNOSIS — I10 ESSENTIAL HYPERTENSION: ICD-10-CM

## 2019-01-16 DIAGNOSIS — R07.1 CHEST PAIN ON BREATHING: Primary | ICD-10-CM

## 2019-01-16 PROCEDURE — 99495 TRANSJ CARE MGMT MOD F2F 14D: CPT | Performed by: PHYSICIAN ASSISTANT

## 2019-01-16 RX ORDER — COVID-19 ANTIGEN TEST
KIT MISCELLANEOUS
COMMUNITY
Start: 2019-01-10 | End: 2019-04-12

## 2019-01-16 RX ORDER — ESTRADIOL 0.05 MG/D
1 PATCH TRANSDERMAL WEEKLY
Qty: 12 PATCH | Refills: 3 | Status: SHIPPED | OUTPATIENT
Start: 2019-01-16 | End: 2020-01-13

## 2019-01-16 RX ORDER — PREDNISONE 1 MG/1
TABLET ORAL
COMMUNITY
Start: 2019-01-15 | End: 2020-05-21

## 2019-01-16 NOTE — PROGRESS NOTES
Subjective   Roxie Bruce is being seen for follow-up after hospitalization at [facility].      The date of hospitalization were January 8 3 January 9, 2019 (Not on file)- .     Discharge Diagnosis during hospitalization was number-one chest pain #2 hypertension     Hospital course: Patient presented to the emergency department with onset of nonexertional chest pain.  Stress test, CT scan of the chest and cardiac workup in hospital were negative for cardiac causes.  Chest pain did not recur while in the hospital.  Home     Discharged to: Home    Discharged on the following medicines: See below  Medication changes made.     Discharge Medications           Accurate as of 1/16/19 11:29 AM. If you have any questions, ask your nurse or doctor.               Continue These Medications      Instructions Start Date   ALEVE 220 MG capsule  Generic drug:  Naproxen Sodium   No dose, route, or frequency recorded.      CALTRATE 600 PO   1 capsule, Oral, Daily      cholecalciferol 1000 units tablet  Commonly known as:  VITAMIN D3   1,000 Units, Oral, Daily      ECOTRIN LOW STRENGTH 81 MG EC tablet  Generic drug:  aspirin   No dose, route, or frequency recorded.      estradiol 0.05 MG/24HR patch  Commonly known as:  CLIMARA   1 patch, Transdermal, Weekly      FOLIC ACID PO   1 tablet/day, Oral      lisinopril 10 MG tablet  Commonly known as:  PRINIVIL,ZESTRIL   TAKE 1 TABLET DAILY      methotrexate 2.5 MG tablet   No dose, route, or frequency recorded.      Nasal Spray Bottle misc   1 spray, Nasal, Daily      predniSONE 5 MG tablet  Commonly known as:  DELTASONE   No dose, route, or frequency recorded.      psyllium 58.6 % packet  Commonly known as:  METAMUCIL   1 packet, Oral, 2 Times Daily         Stop These Medications    ADVIL 200 MG tablet  Generic drug:  ibuprofen  Stopped by:  Mariangel Holt PA-C              Information from the hospitalization was follow-up with primary care physician for hospital  recheck regarding chest pain, keep follow-up with rheumatology for workup of possible rheumatoid arthritis with rheumatoid factor showing positive December 4, 2018    Review of Systems   Constitutional: Negative for fatigue and unexpected weight change.   Respiratory: Negative for cough, chest tightness and shortness of breath.    Cardiovascular: Negative for chest pain, palpitations and leg swelling.   Gastrointestinal: Negative for nausea.   Musculoskeletal: Positive for arthralgias ( *Rheumatology yesterday started on methotrexate and naproxen).   Skin: Negative for color change and rash.   Neurological: Negative for dizziness, syncope, weakness and headaches.       Objective     Vitals:    01/16/19 1053   BP: 152/70   Pulse: 76   Temp: 98 °F (36.7 °C)   SpO2: 99%       Physical Exam   Constitutional: She appears well-developed and well-nourished. No distress.   Neck: No JVD present.   Cardiovascular: Normal rate, regular rhythm, normal heart sounds and intact distal pulses.   No murmur heard.  Pulmonary/Chest: Effort normal and breath sounds normal. No respiratory distress. She exhibits no tenderness.   Abdominal: Soft. She exhibits no distension. There is no tenderness.   Musculoskeletal: She exhibits no edema.   Skin: Skin is warm and dry. She is not diaphoretic. No erythema. No pallor.   Psychiatric: She has a normal mood and affect.   Nursing note and vitals reviewed.      Assessment/Plan     Problem List Items Addressed This Visit        Cardiovascular and Mediastinum    Essential hypertension       Nervous and Auditory    Chest pain - Primary      Other Visit Diagnoses     Rheumatoid arthritis with positive rheumatoid factor, involving unspecified site (CMS/[facility])                Transitional Care Management Certification  I certify that the following are true:  1. Communication was made within 2 business days of discharge.  2. Complexity of Medical Decision Making is moderate.  3. Face to face visit occurred  within 7 days.    *Note: 21695 is for high complexity patients with a face to face visit within 7 days of discharge.  09672 is for high complexity patients with a face to face on days 8-14 post discharge or medium complexity with face to face visit within 14 days post discharge.      As patient is asymptomatic at this time chest pain is resolved his most consistent with a costochondritis secondary to rheumatoid arthritis, will continue with follow-up with rheumatology as scheduled follow-up in 4 weeks.

## 2019-01-22 ENCOUNTER — TRANSCRIBE ORDERS (OUTPATIENT)
Dept: ADMINISTRATIVE | Facility: HOSPITAL | Age: 74
End: 2019-01-22

## 2019-01-22 DIAGNOSIS — Z12.31 VISIT FOR SCREENING MAMMOGRAM: Primary | ICD-10-CM

## 2019-03-22 ENCOUNTER — TELEPHONE (OUTPATIENT)
Dept: FAMILY MEDICINE CLINIC | Facility: CLINIC | Age: 74
End: 2019-03-22

## 2019-03-22 NOTE — TELEPHONE ENCOUNTER
----- Message from Tete Ansari sent at 3/22/2019  1:01 PM EDT -----  Contact: PT [facility]ALK IN   SAW DR FREIRE, RHEUMATOLOGIST, POTASSIUM WAS ELEVATED AT 5.8, SAID TO CONTACT PCP. SHE HAS WELLNESS SCHED WITH DR ESTES ON 4/12 BUT WANTED TO MAKE SURE HE KNEW AND SEE IF SHE NEEDS TO SEE HIM SOONER OR SHOULD BE MORE CONCERNED.     REQUESTING A CALL BACK FROM NURSE/MA AT HOME # OR CELL# IF NO ANSWER.

## 2019-03-22 NOTE — TELEPHONE ENCOUNTER
Usually an elevated potassium level unless taking potassium has to do with how the sample was taken as opposed to the true blood test.  We will recheck this when she comes in for her physical.

## 2019-04-03 ENCOUNTER — HOSPITAL ENCOUNTER (OUTPATIENT)
Dept: MAMMOGRAPHY | Facility: HOSPITAL | Age: 74
Discharge: HOME | End: 2019-04-03
Admitting: FAMILY MEDICINE

## 2019-04-03 DIAGNOSIS — Z12.31 VISIT FOR SCREENING MAMMOGRAM: ICD-10-CM

## 2019-04-03 PROCEDURE — 77067 SCR MAMMO BI INCL CAD: CPT

## 2019-04-03 PROCEDURE — 77063 BREAST TOMOSYNTHESIS BI: CPT | Performed by: RADIOLOGY

## 2019-04-03 PROCEDURE — 77063 BREAST TOMOSYNTHESIS BI: CPT

## 2019-04-03 PROCEDURE — 77067 SCR MAMMO BI INCL CAD: CPT | Performed by: RADIOLOGY

## 2019-04-12 ENCOUNTER — OFFICE VISIT (OUTPATIENT)
Dept: FAMILY MEDICINE CLINIC | Facility: CLINIC | Age: 74
End: 2019-04-12

## 2019-04-12 VITALS
OXYGEN SATURATION: 98 % | HEIGHT: 67 IN | BODY MASS INDEX: 19.93 KG/M2 | WEIGHT: 127 LBS | SYSTOLIC BLOOD PRESSURE: 130 MMHG | HEART RATE: 69 BPM | TEMPERATURE: 98.4 F | DIASTOLIC BLOOD PRESSURE: 70 MMHG

## 2019-04-12 DIAGNOSIS — I10 ESSENTIAL HYPERTENSION: ICD-10-CM

## 2019-04-12 DIAGNOSIS — Z00.00 ROUTINE GENERAL MEDICAL EXAMINATION AT A HEALTH CARE FACILITY: Primary | ICD-10-CM

## 2019-04-12 DIAGNOSIS — Z00.00 MEDICARE ANNUAL WELLNESS VISIT, SUBSEQUENT: ICD-10-CM

## 2019-04-12 DIAGNOSIS — M05.9 RHEUMATOID ARTHRITIS WITH POSITIVE RHEUMATOID FACTOR, INVOLVING UNSPECIFIED SITE (HCC): ICD-10-CM

## 2019-04-12 PROCEDURE — 99397 PER PM REEVAL EST PAT 65+ YR: CPT | Performed by: FAMILY MEDICINE

## 2019-04-12 PROCEDURE — G0439 PPPS, SUBSEQ VISIT: HCPCS | Performed by: FAMILY MEDICINE

## 2019-04-12 RX ORDER — LISINOPRIL 10 MG/1
TABLET ORAL
Qty: 90 TABLET | Refills: 1 | Status: SHIPPED | OUTPATIENT
Start: 2019-04-12 | End: 2020-05-21

## 2019-04-13 NOTE — PROGRESS NOTES
Subjective   Roxie Bruce is a 73 y.o. female    Chief Complaint    Annual physical exam  Subsequent Medicare wellness  Rheumatoid arthritis  Hypertension    History of Present Illness  Patient presents today for annual physical examination.  She is due for her subsequent Medicare wellness annual visit also.  Most recent medical changes include diagnosis of rheumatoid arthritis.  She is now followed by Dr[facility] Carrasco [facility].  She has been placed on methotrexate therapy which has helped her immensely.  She is back to exercising on a regular basis at the gym with limited complaints of joint or muscle pains or aches.  She is very pleased with her treatment.  History of hypertension treated with ACE inhibitor due to adverse reaction primarily swelling of the mouth and lips as well as rash in that area she has stopped her lisinopril and her symptoms have resolved.  Her blood pressure remains within normal limits today so we will continue to monitor this.  She has no acute complaints today.    The following portions of the patient's history were reviewed and updated as appropriate: allergies, current medications, past social history and problem list    Review of Systems   Constitutional: Negative.  Negative for fatigue and unexpected weight change.   HENT: Negative.    Eyes: Negative.    Respiratory: Negative.  Negative for cough, chest tightness and shortness of breath.    Cardiovascular: Negative.  Negative for chest pain, palpitations and leg swelling.   Gastrointestinal: Negative.  Negative for nausea.   Endocrine: Negative.    Genitourinary: Negative.    Musculoskeletal: Negative.    Skin: Negative.  Negative for color change and rash.   Allergic/Immunologic: Negative.    Neurological: Negative.  Negative for dizziness, syncope, weakness and headaches.   Hematological: Negative.    Psychiatric/Behavioral: Negative.    All other systems reviewed and are negative.      Objective      Vitals:    04/12/19 0859   BP: 130/70   Pulse: 69   Temp: 98.4 °F (36.9 °C)   SpO2: 98%       Physical Exam   Constitutional: She is oriented to person, place, and time. She appears well-developed and well-nourished.   HENT:   Head: Normocephalic and atraumatic.   Right Ear: External ear normal.   Left Ear: External ear normal.   Nose: Nose normal.   Mouth/Throat: Oropharynx is clear and moist.   Eyes: Conjunctivae and EOM are normal. Pupils are equal, round, and reactive to light.   Neck: Normal range of motion. Neck supple. No JVD present. Carotid bruit is not present. No thyromegaly present.   Cardiovascular: Normal rate, regular rhythm, normal heart sounds, intact distal pulses and normal pulses.   No murmur heard.  Pulmonary/Chest: Effort normal and breath sounds normal. No respiratory distress.   Abdominal: Soft. Bowel sounds are normal. She exhibits no mass. There is no hepatosplenomegaly. There is no tenderness.   Musculoskeletal: Normal range of motion. She exhibits no edema.   Lymphadenopathy:     She has no cervical adenopathy.   Neurological: She is alert and oriented to person, place, and time. She has normal reflexes. No cranial nerve deficit.   Skin: Skin is warm and dry.   Psychiatric: She has a normal mood and affect.   Nursing note and vitals reviewed.      Assessment/Plan   Problem List Items Addressed This Visit        Cardiovascular and Mediastinum    Essential hypertension      Other Visit Diagnoses     Routine general medical examination at a health care facility    -  Primary    Medicare annual wellness visit, subsequent        Rheumatoid arthritis with positive rheumatoid factor, involving unspecified site (CMS/[facility])            Patient is counseled today regarding preventative health measures to include safety in the home, medication safety measures, proper diet issues and appropriate exercise levels.

## 2019-04-13 NOTE — PROGRESS NOTES
Subsequent Medicare Wellness Visit   The ABC's of the Annual Wellness Visit    Chief Complaint   Patient presents with   • Annual Exam     medicare wellness.  Pt is fasting this morning.     • Hypertension     Pt stopped lisinopril, had swelling of her mouth, and might has interacted with MTX   • Rheumatoid Arthritis       HPI:  Roxie Bruce, -1945, is a 73 y.o. female who presents for a Subsequent Medicare Wellness Visit.    Recent Hospitalizations:  No hospitalization(s) within the last year..    Current Medical Providers:  Patient Care Team:  Justus Esquivel MD as PCP - General (Family Medicine)  Parker Germain MD as Consulting Physician (Ophthalmology)    Health Habits and Functional and Cognitive Screening and Depression Screening:  Functional & Cognitive Status 2019   Do you have difficulty preparing food and eating? No   Do you have difficulty bathing yourself, getting dressed or grooming yourself? No   Do you have difficulty using the toilet? No   Do you have difficulty moving around from place to place? No   Do you have trouble with steps or getting out of a bed or a chair? No   In the past year have you fallen or experienced a near fall? No   Current Diet Well Balanced Diet   Dental Exam Up to date   Eye Exam Up to date   Exercise (times per week) 3 times per week   Current Exercise Activities Include Cardiovasular Workout on Exercise Equipment   Do you need help using the phone?  No   Are you deaf or do you have serious difficulty hearing?  No   Do you need help with transportation? No   Do you need help shopping? No   Do you need help preparing meals?  No   Do you need help with housework?  No   Do you need help with laundry? No   Do you need help taking your medications? No   Do you need help managing money? No   Do you ever drive or ride in a car without wearing a seat belt? No   Have you felt unusual stress, anger or loneliness in the last month? -   Who do you live with?  -   If you need help, do you have trouble finding someone available to you? -   Have you been bothered in the last four weeks by sexual problems? -   Do you have difficulty concentrating, remembering or making decisions? -       Compared to one year ago, the patient feels her physical health is better and her mental health is the same.    Depression Screen:  PHQ-2/PHQ-9 Depression Screening 4/12/2019   Little interest or pleasure in doing things 0   Feeling down, depressed, or hopeless 0   Trouble falling or staying asleep, or sleeping too much -   Feeling tired or having little energy -   Poor appetite or overeating -   Feeling bad about yourself - or that you are a failure or have let yourself or your family down -   Trouble concentrating on things, such as reading the newspaper or watching television -   Moving or speaking so slowly that other people could have noticed. Or the opposite - being so fidgety or restless that you have been moving around a lot more than usual -   Thoughts that you would be better off dead, or of hurting yourself in some way -   Total Score 0   If you checked off any problems, how difficult have these problems made it for you to do your work, take care of things at home, or get along with other people? -         Past Medical/Family/Social History:  The following portions of the patient's history were reviewed and updated as appropriate: allergies, current medications, past family history, past medical history, past social history, past surgical history and problem list.    No Known Allergies      Current Outpatient Medications:   •  aspirin (ECOTRIN LOW STRENGTH) 81 MG EC tablet, , Disp: , Rfl:   •  Calcium Carbonate (CALTRATE 600 PO), Take 1 capsule by mouth Daily., Disp: , Rfl:   •  cholecalciferol (VITAMIN D3) 1000 UNITS tablet, Take 1,000 Units by mouth Daily., Disp: , Rfl:   •  estradiol (CLIMARA) 0.05 MG/24HR patch, Place 1 patch on the skin as directed by provider 1 (One) Time  "Per Week., Disp: 12 patch, Rfl: 3  •  FOLIC ACID PO, Take 1 tablet/day by mouth., Disp: , Rfl:   •  methotrexate 2.5 MG tablet, , Disp: , Rfl:   •  Misc. Devices (NASAL SPRAY BOTTLE) misc, 1 spray into each nostril Daily., Disp: , Rfl:   •  predniSONE (DELTASONE) 5 MG tablet, , Disp: , Rfl:   •  lisinopril (PRINIVIL,ZESTRIL) 10 MG tablet, TAKE 1 TABLET DAILY, Disp: 90 tablet, Rfl: 1    Aspirin use counseling: Taking ASA appropriately as indicated    Current medication list contains no high risk medications.  No harmful drug interactions have been identified.     Family History   Problem Relation Age of Onset   • Breast cancer Other         age unknown   • Endometrial cancer Neg Hx    • Ovarian cancer Neg Hx        Social History     Tobacco Use   • Smoking status: Never Smoker   • Smokeless tobacco: Never Used   Substance Use Topics   • Alcohol use: Yes     Types: 1 - 2 Glasses of wine per week     Comment: rarely       Past Surgical History:   Procedure Laterality Date   • BREAST BIOPSY Left     x2 20-30 years ago    • COLONOSCOPY  04/20/2016    Most recent one.   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 BMI is within normal parameters. No follow-up required..      No exam data present    The patient has no evidence of cognitve impairment.     Physical Exam    Recent Lab Results:     Lab Results   Component Value Date    CHOL 228 (H) 04/09/2018    TRIG 64 04/09/2018     (H) 04/09/2018       Assessment/Plan   Age-appropriate Screening Schedule:  Refer to the list below for future screening recommendations based on patient's " age, sex and/or medical conditions.      Health Maintenance   Topic Date Due   • ZOSTER VACCINE (2 of 2) 04/21/2020 (Originally 5/26/2015)   • INFLUENZA VACCINE  08/01/2019   • MAMMOGRAM  04/03/2021   • COLONOSCOPY  04/16/2026   • TDAP/TD VACCINES (2 - Td) 03/30/2027   • PNEUMOCOCCAL VACCINES (65+ LOW/MEDIUM RISK)  Completed       Medicare Risks and Personalized Health Plan:  Immunizations Discussed/Encouraged (specific immunizations; Shingrix )      CMS-Preventive Services Quick Reference  Medicare Preventive Services Addressed:  Annual Wellness Visit (AWV)    Advance Care Planning:  Patient has an advance directive - a copy has not been provided. Have asked the patient to send this to us to add to record    Diagnoses and all orders for this visit:    1. Routine general medical examination at a health care facility (Primary)    2. Medicare annual wellness visit, subsequent    3. Rheumatoid arthritis with positive rheumatoid factor, involving unspecified site (CMS/formerly [facility])    4. Essential hypertension        An After Visit Summary and PPPS with all of these plans were given to the patient.      Follow Up:  Return in about 1 year (around 4/12/2020) for Annual, Medicare Wellness.

## 2019-10-30 ENCOUNTER — TELEPHONE (OUTPATIENT)
Dept: FAMILY MEDICINE CLINIC | Facility: CLINIC | Age: 74
End: 2019-10-30

## 2019-10-30 NOTE — TELEPHONE ENCOUNTER
Received the form from the company and signed by Dr. Esquivel and faxed the form for the patient.  It had confirmation that it went through

## 2019-10-30 NOTE — TELEPHONE ENCOUNTER
----- Message from Trinh Washington sent at 10/28/2019  9:25 AM EDT -----  Can someone call Linh (pharmacist) at [facility] about her medication, estradiol (CLIMARA) 0.05 MG/24HR patches.  Patient can't get her patches until a form gets sent to Linh and her number is [phone]..  ThanksTrinh..

## 2020-01-13 RX ORDER — ESTRADIOL 0.05 MG/D
PATCH TRANSDERMAL
Qty: 12 EACH | Refills: 4 | Status: SHIPPED | OUTPATIENT
Start: 2020-01-13 | End: 2021-01-04 | Stop reason: SDUPTHER

## 2020-02-21 ENCOUNTER — TRANSCRIBE ORDERS (OUTPATIENT)
Dept: ADMINISTRATIVE | Facility: HOSPITAL | Age: 75
End: 2020-02-21

## 2020-02-21 DIAGNOSIS — Z12.31 VISIT FOR SCREENING MAMMOGRAM: Primary | ICD-10-CM

## 2020-04-06 ENCOUNTER — APPOINTMENT (OUTPATIENT)
Dept: MAMMOGRAPHY | Facility: HOSPITAL | Age: 75
End: 2020-04-06

## 2020-05-21 ENCOUNTER — OFFICE VISIT (OUTPATIENT)
Dept: FAMILY MEDICINE CLINIC | Facility: CLINIC | Age: 75
End: 2020-05-21

## 2020-05-21 VITALS
DIASTOLIC BLOOD PRESSURE: 52 MMHG | WEIGHT: 128 LBS | TEMPERATURE: 97.5 F | SYSTOLIC BLOOD PRESSURE: 168 MMHG | BODY MASS INDEX: 20.09 KG/M2 | HEART RATE: 75 BPM | HEIGHT: 67 IN | OXYGEN SATURATION: 98 %

## 2020-05-21 DIAGNOSIS — I10 ESSENTIAL HYPERTENSION: ICD-10-CM

## 2020-05-21 DIAGNOSIS — Z00.00 MEDICARE ANNUAL WELLNESS VISIT, SUBSEQUENT: Primary | ICD-10-CM

## 2020-05-21 DIAGNOSIS — M05.9 RHEUMATOID ARTHRITIS WITH POSITIVE RHEUMATOID FACTOR, INVOLVING UNSPECIFIED SITE (HCC): ICD-10-CM

## 2020-05-21 PROCEDURE — 93000 ELECTROCARDIOGRAM COMPLETE: CPT | Performed by: PHYSICIAN ASSISTANT

## 2020-05-21 PROCEDURE — G0439 PPPS, SUBSEQ VISIT: HCPCS | Performed by: PHYSICIAN ASSISTANT

## 2020-05-21 RX ORDER — CALCIUM CARBONATE 200(500)MG
1 TABLET,CHEWABLE ORAL DAILY
COMMUNITY

## 2020-05-21 NOTE — PROGRESS NOTES
The ABCs of the Annual Wellness Visit  Subsequent Medicare Wellness Visit    Chief Complaint   Patient presents with   • Medicare Wellness-subsequent     Medicare Wellness Exam    • Hypertension     Stopped bystolic due to side effects 1 year ago and wants to discuss blood pressure       Subjective   History of Present Illness:  Roxie Bruce is a 74 y.o. female who presents for a Subsequent Medicare Wellness Visit.    HEALTH RISK ASSESSMENT    Recent Hospitalizations:  No hospitalization(s) within the last year.    Current Medical Providers:  Patient Care Team:  Justus Esquivel MD as PCP - General (Family Medicine)  Parker Germain MD as Consulting Physician (Ophthalmology)    Smoking Status:  Social History     Tobacco Use   Smoking Status Never Smoker   Smokeless Tobacco Never Used       Alcohol Consumption:  Social History     Substance and Sexual Activity   Alcohol Use Yes   • Types: 1 - 2 Glasses of wine per week    Comment: rarely       Depression Screen:   PHQ-2/PHQ-9 Depression Screening 5/21/2020   Little interest or pleasure in doing things 0   Feeling down, depressed, or hopeless 0   Trouble falling or staying asleep, or sleeping too much -   Feeling tired or having little energy -   Poor appetite or overeating -   Feeling bad about yourself - or that you are a failure or have let yourself or your family down -   Trouble concentrating on things, such as reading the newspaper or watching television -   Moving or speaking so slowly that other people could have noticed. Or the opposite - being so fidgety or restless that you have been moving around a lot more than usual -   Thoughts that you would be better off dead, or of hurting yourself in some way -   Total Score 0   If you checked off any problems, how difficult have these problems made it for you to do your work, take care of things at home, or get along with other people? -       Fall Risk Screen:  RED Fall Risk Assessment was  completed, and patient is at LOW risk for falls.Assessment completed on:5/21/2020    Health Habits and Functional and Cognitive Screening:  Functional & Cognitive Status 5/21/2020   Do you have difficulty preparing food and eating? No   Do you have difficulty bathing yourself, getting dressed or grooming yourself? No   Do you have difficulty using the toilet? No   Do you have difficulty moving around from place to place? No   Do you have trouble with steps or getting out of a bed or a chair? No   Current Diet Well Balanced Diet   Dental Exam Up to date   Eye Exam Up to date   Exercise (times per week) 3 times per week   Current Exercise Activities Include Cardiovasular Workout on Exercise Equipment   Do you need help using the phone?  No   Are you deaf or do you have serious difficulty hearing?  No   Do you need help with transportation? No   Do you need help shopping? No   Do you need help preparing meals?  No   Do you need help with housework?  No   Do you need help with laundry? No   Do you need help taking your medications? No   Do you need help managing money? No   Do you ever drive or ride in a car without wearing a seat belt? No   Have you felt unusual stress, anger or loneliness in the last month? No   Who do you live with? Spouse   If you need help, do you have trouble finding someone available to you? No   Have you been bothered in the last four weeks by sexual problems? No   Do you have difficulty concentrating, remembering or making decisions? No         Does the patient have evidence of cognitive impairment? No    Asprin use counseling:Does not need ASA (and currently is not on it)    Age-appropriate Screening Schedule:  Refer to the list below for future screening recommendations based on patient's age, sex and/or medical conditions. Orders for these recommended tests are listed in the plan section. The patient has been provided with a written plan.    Health Maintenance   Topic Date Due   • ZOSTER  VACCINE (2 of 2) 05/26/2015   • INFLUENZA VACCINE  08/01/2020   • MAMMOGRAM  04/03/2021   • COLONOSCOPY  04/16/2026   • TDAP/TD VACCINES (2 - Td) 03/30/2027          The following portions of the patient's history were reviewed and updated as appropriate: allergies, past family history, past medical history, past social history, past surgical history and problem list.    Outpatient Medications Prior to Visit   Medication Sig Dispense Refill   • Calcium Carbonate (CALTRATE 600 PO) Take 1 capsule by mouth Daily.     • calcium carbonate (Tums) 500 MG chewable tablet Chew 1 tablet Daily.     • cholecalciferol (VITAMIN D3) 1000 UNITS tablet Take 1,000 Units by mouth Daily.     • estradiol (CLIMARA) 0.05 MG/24HR patch PLACE 1 PATCH ON THE SKIN AS DIRECTED BY PROVIDER ONE TIME PER WEEK 12 each 4   • FOLIC ACID PO Take 1 tablet/day by mouth.     • methotrexate 2.5 MG tablet      • Misc. Devices (NASAL SPRAY BOTTLE) misc 1 spray into each nostril Daily.     • aspirin (ECOTRIN LOW STRENGTH) 81 MG EC tablet      • lisinopril (PRINIVIL,ZESTRIL) 10 MG tablet TAKE 1 TABLET DAILY 90 tablet 1   • predniSONE (DELTASONE) 5 MG tablet        No facility-administered medications prior to visit.        Patient Active Problem List   Diagnosis   • Essential hypertension   • Menopausal and female climacteric states   • Chest pain       Advanced Care Planning:  ACP discussion was held with the patient during this visit. Patient does not have an advance directive, information provided.    Review of Systems   Constitutional: Negative.    HENT: Negative.    Eyes: Negative.    Respiratory: Negative.    Cardiovascular: Negative.    Gastrointestinal: Negative.    Endocrine: Negative.    Genitourinary: Negative.    Musculoskeletal: Negative.    Skin: Negative.    Allergic/Immunologic: Negative.    Neurological: Negative.    Hematological: Negative.    Psychiatric/Behavioral: Negative.    All other systems reviewed and are negative.      Compared to  "one year ago, the patient feels her physical health is better.  Compared to one year ago, the patient feels her mental health is better.    Reviewed chart for potential of high risk medication in the elderly: yes  Reviewed chart for potential of harmful drug interactions in the elderly:yes    Objective         Vitals:    05/21/20 1410   BP: 168/52   Pulse: 75   Temp: 97.5 °F (36.4 °C)   SpO2: 98%   Weight: 58.1 kg (128 lb)   Height: 170.2 cm (67\")       Body mass index is 20.05 kg/m².  Discussed the patient's BMI with her. The BMI is in the acceptable range  .    Physical Exam   Constitutional: She is oriented to person, place, and time. She appears well-developed and well-nourished. No distress.   HENT:   Head: Normocephalic and atraumatic.   Eyes: Conjunctivae are normal.   Neck: Normal range of motion. Neck supple. Carotid bruit is not present. No thyromegaly present.   Cardiovascular: Normal rate, regular rhythm, normal heart sounds and intact distal pulses.   No murmur heard.  Pulmonary/Chest: Effort normal and breath sounds normal. No respiratory distress. She exhibits no tenderness.   Abdominal: Soft. Bowel sounds are normal. She exhibits no distension and no mass. There is no tenderness.   Musculoskeletal: Normal range of motion. She exhibits no edema.   Neurological: She is alert and oriented to person, place, and time. She has normal reflexes. No cranial nerve deficit.   Skin: Skin is warm and dry. No rash noted. She is not diaphoretic. No erythema. No pallor.   Psychiatric: She has a normal mood and affect. Her behavior is normal. Judgment and thought content normal.   Nursing note and vitals reviewed.      ECG 12 Lead  Date/Time: 5/21/2020 2:59 PM  Performed by: Mariangel Holt PA-C  Authorized by: Mariangel Holt PA-C   Comparison: compared with previous ECG from 1/19/2019  Rhythm: sinus rhythm  Rate: normal  BPM: 70  Conduction: conduction normal  ST Segments: ST segments normal  T Waves: T " waves normal  QRS axis: normal  Other: no other findings    Clinical impression: normal ECG                Assessment/Plan   Medicare Risks and Personalized Health Plan  CMS Preventative Services Quick Reference  Advance Directive Discussion    The above risks/problems have been discussed with the patient.  Pertinent information has been shared with the patient in the After Visit Summary.  Follow up plans and orders are seen below in the Assessment/Plan Section.    Diagnoses and all orders for this visit:    1. Medicare annual wellness visit, subsequent (Primary)    2. Rheumatoid arthritis with positive rheumatoid factor, involving unspecified site (CMS/[facility])    3. Essential hypertension    Other orders  -     ECG 12 Lead      Follow Up:  Return in about 6 months (around 11/21/2020).     An After Visit Summary and PPPS were given to the patient.       +  I discussed patient systolic blood pressure elevation with her today she will keep an eye on her blood pressure at home and through her rheumatologist and her blood pressure continues to stay elevated greater than 150/90 she will follow-up for further evaluation and treatment.    Answers for HPI/ROS submitted by the patient on 5/18/2020   What is the primary reason for your visit?: Physical

## 2020-05-28 ENCOUNTER — APPOINTMENT (OUTPATIENT)
Dept: MAMMOGRAPHY | Facility: HOSPITAL | Age: 75
End: 2020-05-28

## 2020-08-13 ENCOUNTER — HOSPITAL ENCOUNTER (OUTPATIENT)
Dept: MAMMOGRAPHY | Facility: HOSPITAL | Age: 75
Discharge: HOME | End: 2020-08-13
Admitting: FAMILY MEDICINE

## 2020-08-13 DIAGNOSIS — Z12.31 VISIT FOR SCREENING MAMMOGRAM: ICD-10-CM

## 2020-08-13 PROCEDURE — 77067 SCR MAMMO BI INCL CAD: CPT | Performed by: RADIOLOGY

## 2020-08-13 PROCEDURE — 77067 SCR MAMMO BI INCL CAD: CPT

## 2020-08-13 PROCEDURE — 77063 BREAST TOMOSYNTHESIS BI: CPT | Performed by: RADIOLOGY

## 2020-08-13 PROCEDURE — 77063 BREAST TOMOSYNTHESIS BI: CPT

## 2020-12-28 ENCOUNTER — TELEPHONE (OUTPATIENT)
Dept: FAMILY MEDICINE CLINIC | Facility: CLINIC | Age: 75
End: 2020-12-28

## 2020-12-28 NOTE — TELEPHONE ENCOUNTER
PATIENT CALLED AND STATED THAT PHARMACY NEEDS   PRIOR AUTHORIZATION FOR :   estradiol (CLIMARA) 0.05 MG/24HR patch      CALLED INTO   [facility] -   Ph:   [phone]

## 2021-01-04 RX ORDER — ESTRADIOL 0.05 MG/D
1 PATCH TRANSDERMAL WEEKLY
Qty: 12 EACH | Refills: 4 | Status: SHIPPED | OUTPATIENT
Start: 2021-01-04 | End: 2021-03-19 | Stop reason: SDUPTHER

## 2021-01-04 RX ORDER — ESTRADIOL 0.05 MG/D
1 PATCH TRANSDERMAL WEEKLY
Qty: 12 EACH | Refills: 4 | Status: CANCELLED | OUTPATIENT
Start: 2021-01-04

## 2021-01-13 ENCOUNTER — TELEPHONE (OUTPATIENT)
Dept: FAMILY MEDICINE CLINIC | Facility: CLINIC | Age: 76
End: 2021-01-13

## 2021-01-13 NOTE — TELEPHONE ENCOUNTER
Linh from [facility] called and stated that a prior-authorization criteria questionnaire has been faxed to the office twice and they have yet to receive this back. Linh states that the questions are the only thing they are needing to push the prior-authorization through for the patients estradiol (CLIMARA) 0.05 MG/24HR patch. Please advise.     Linh Call Back [phone]  Linh Fax Number [phone]

## 2021-01-13 NOTE — TELEPHONE ENCOUNTER
Spoke to krystle, Linh was on another call. Gave fax number to Krystle to have forms re faxed, I have not had anything sent to me regarding this matter

## 2021-01-15 ENCOUNTER — TELEPHONE (OUTPATIENT)
Dept: FAMILY MEDICINE CLINIC | Facility: CLINIC | Age: 76
End: 2021-01-15

## 2021-01-15 NOTE — TELEPHONE ENCOUNTER
ALEKS CALLED FROM [employer] REGARDING A FAX THAT WAS SENT TO THE OFFICE REGARDING PATIENTS CLIMARA; SHE SAID [facility] SENT OUT THE FAX 2 TIMES FOR A PRIOR AUTHORIZATION ON [id number];    ALEKS [phone] PHONE  SHE SAID THE CLINICAL QUESTIONS FOR THE AUTHORIZATION HAS A FAX NUMBER

## 2021-01-20 ENCOUNTER — TELEPHONE (OUTPATIENT)
Dept: FAMILY MEDICINE CLINIC | Facility: CLINIC | Age: 76
End: 2021-01-20

## 2021-01-20 NOTE — TELEPHONE ENCOUNTER
JONES CALLED BACK TO CHECK ON THE STATUS OF PRIOR AUTHORIZATION.    JONES IS REQUESTING A CALL BACK FROM SABINO OTERO      PLEASE ADVISE  [phone]

## 2021-03-19 RX ORDER — ESTRADIOL 0.05 MG/D
1 PATCH TRANSDERMAL WEEKLY
Qty: 12 EACH | Refills: 0 | Status: SHIPPED | OUTPATIENT
Start: 2021-03-19 | End: 2021-05-25 | Stop reason: SDUPTHER

## 2021-03-19 NOTE — TELEPHONE ENCOUNTER
PATIENT HAS APPT SET UP FOR MAY 25TH, SHE WOULD LIKE A 90 DAY SUPPLY SENT TO [facility] UNTIL NEXT APPT - [facility]

## 2021-04-16 ENCOUNTER — OFFICE VISIT (OUTPATIENT)
Dept: FAMILY MEDICINE CLINIC | Facility: CLINIC | Age: 76
End: 2021-04-16

## 2021-04-16 VITALS
DIASTOLIC BLOOD PRESSURE: 94 MMHG | RESPIRATION RATE: 16 BRPM | WEIGHT: 137 LBS | BODY MASS INDEX: 21.5 KG/M2 | TEMPERATURE: 97.8 F | SYSTOLIC BLOOD PRESSURE: 166 MMHG | HEIGHT: 67 IN | OXYGEN SATURATION: 100 %

## 2021-04-16 DIAGNOSIS — I10 ESSENTIAL HYPERTENSION: ICD-10-CM

## 2021-04-16 DIAGNOSIS — J30.1 SEASONAL ALLERGIC RHINITIS DUE TO POLLEN: ICD-10-CM

## 2021-04-16 DIAGNOSIS — L03.039 CELLULITIS OF TOE, UNSPECIFIED LATERALITY: ICD-10-CM

## 2021-04-16 DIAGNOSIS — J01.90 ACUTE SINUSITIS, RECURRENCE NOT SPECIFIED, UNSPECIFIED LOCATION: Primary | ICD-10-CM

## 2021-04-16 PROCEDURE — 99214 OFFICE O/P EST MOD 30 MIN: CPT | Performed by: FAMILY MEDICINE

## 2021-04-16 RX ORDER — FLUTICASONE PROPIONATE 50 MCG
1 SPRAY, SUSPENSION (ML) NASAL DAILY
COMMUNITY
End: 2022-05-27

## 2021-04-16 RX ORDER — CEFDINIR 300 MG/1
300 CAPSULE ORAL 2 TIMES DAILY
Qty: 20 CAPSULE | Refills: 0 | Status: SHIPPED | OUTPATIENT
Start: 2021-04-16 | End: 2021-05-04

## 2021-04-16 NOTE — PROGRESS NOTES
Subjective   Roxie Bruce is a 75 y.o. female    Chief Complaint    Head discomfort  Sinus problems  Elevated blood pressure    History of Present Illness  The patient complains of a toe that is red and infected. She has been wearing a Band-Aid on it. The dermatologist told her to keep her toes warm.Toe has markedly improved.    Regarding her head discomfort, she described it as feeling something is is on her head. She admits to not taking her allergy spray until this week. She has constant drainage, some blood tinged, but not dark mucus, just clear with a little bit of blood. She put some hydrogen peroxide on a Q-Tip and has not had any bleeding for quite some time. The right side has been swollen and it is hard to get the Q-Tip in sometimes. Then she notes that last Friday while she was eating the salivary gland was swollen enough that it could be seen and she could feel a sensation on the inside of her tongue.  The following day she had the same thing happen but to a lesser degree, and it has been less and less since then.     The patient said she will get her physical with Olga Lidia Holt in 05/2021.     The patient notes her blood pressure is high today at 166/94 mmHg. Last 05/2020 her blood pressure was 168/52.     The following portions of the patient's history were reviewed and updated as appropriate: allergies, current medications, past social history and problem list    Review of Systems   Constitutional: Negative for chills, fatigue, fever and unexpected weight change.   HENT: Positive for congestion, ear pain, postnasal drip, rhinorrhea and sinus pressure. Negative for sore throat.    Eyes: Positive for pain.   Respiratory: Negative for cough, chest tightness and shortness of breath.    Cardiovascular: Negative for chest pain, palpitations and leg swelling.   Gastrointestinal: Negative for nausea.   Skin: Negative for color change and rash.   Neurological: Negative for dizziness, syncope,  weakness and headaches.   Hematological: Negative for adenopathy.       Objective     Vitals:    04/16/21 1518   BP: 166/94   Resp: 16   Temp: 97.8 °F (36.6 °C)   SpO2: 100%       Physical Exam  Vitals and nursing note reviewed.   Constitutional:       Appearance: She is well-developed.   HENT:      Head: Normocephalic and atraumatic.      Right Ear: Tympanic membrane and ear canal normal.      Left Ear: Tympanic membrane and ear canal normal.      Nose: Mucosal edema and rhinorrhea present.      Right Sinus: Maxillary sinus tenderness and frontal sinus tenderness present.      Left Sinus: Maxillary sinus tenderness and frontal sinus tenderness present.      Mouth/Throat:      Pharynx: No oropharyngeal exudate.   Eyes:      Pupils: Pupils are equal, round, and reactive to light.   Neck:      Vascular: No JVD.   Cardiovascular:      Rate and Rhythm: Normal rate and regular rhythm.      Pulses: Normal pulses.      Heart sounds: Normal heart sounds. No murmur heard.     Pulmonary:      Effort: Pulmonary effort is normal. No respiratory distress.      Breath sounds: Normal breath sounds.   Abdominal:      General: Bowel sounds are normal.      Palpations: Abdomen is soft.      Tenderness: There is no abdominal tenderness.   Skin:     General: Skin is warm and dry.         Assessment/Plan   Problems Addressed this Visit        Cardiac and Vasculature    Essential hypertension      Other Visit Diagnoses     Acute sinusitis, recurrence not specified, unspecified location    -  Primary    Relevant Medications    fluticasone (FLONASE) 50 MCG/ACT nasal spray    cefdinir (OMNICEF) 300 MG capsule    Cellulitis of toe, unspecified laterality          Diagnoses       Codes Comments    Acute sinusitis, recurrence not specified, unspecified location    -  Primary ICD-10-CM: J01.90  ICD-9-CM: 461.9     Essential hypertension     ICD-10-CM: I10  ICD-9-CM: 401.9     Cellulitis of toe, unspecified laterality     ICD-10-CM:  L03.039  ICD-9-CM: 681.10       Follow-up blood pressure at physical that is already scheduled         Scribed for MAUDE Esquivel MD by Aisha Pascal.  04/16/21   17:02 EDT    I have personally performed the services described in this document as scribed by the above individual, and it is both accurate and complete.  MAUDE Esquivel MD  4/18/2021  21:14 EDT

## 2021-05-04 ENCOUNTER — OFFICE VISIT (OUTPATIENT)
Dept: FAMILY MEDICINE CLINIC | Facility: CLINIC | Age: 76
End: 2021-05-04

## 2021-05-04 VITALS
BODY MASS INDEX: 21.19 KG/M2 | DIASTOLIC BLOOD PRESSURE: 84 MMHG | RESPIRATION RATE: 14 BRPM | HEIGHT: 67 IN | OXYGEN SATURATION: 99 % | SYSTOLIC BLOOD PRESSURE: 156 MMHG | TEMPERATURE: 97 F | HEART RATE: 71 BPM | WEIGHT: 135 LBS

## 2021-05-04 DIAGNOSIS — J01.90 ACUTE SINUSITIS, RECURRENCE NOT SPECIFIED, UNSPECIFIED LOCATION: Primary | ICD-10-CM

## 2021-05-04 PROCEDURE — 99213 OFFICE O/P EST LOW 20 MIN: CPT | Performed by: PHYSICIAN ASSISTANT

## 2021-05-04 RX ORDER — CEFDINIR 300 MG/1
300 CAPSULE ORAL 2 TIMES DAILY
Qty: 20 CAPSULE | Refills: 0 | Status: SHIPPED | OUTPATIENT
Start: 2021-05-04 | End: 2021-05-25

## 2021-05-04 RX ORDER — AZELASTINE 1 MG/ML
2 SPRAY, METERED NASAL 2 TIMES DAILY
Qty: 30 ML | Refills: 12 | Status: SHIPPED | OUTPATIENT
Start: 2021-05-04 | End: 2022-05-27 | Stop reason: SDUPTHER

## 2021-05-04 NOTE — PROGRESS NOTES
Subjective   Roxie Bruce is a 75 y.o. female  Sinusitis (F/U-recently finished cefdinir with some improvement. )      History of Present Illness  Patient is a pleasant 75-year-old white female who comes in plan of sinus pressure congestion was treated for sinus infection got better but symptoms start return states she has sinus pressure blowing yellow drainage from nose right side nose feels congested.  Frontal headache she states she been off her medicine for a week and symptoms have returned  The following portions of the patient's history were reviewed and updated as appropriate: allergies, current medications, past social history and problem list    Review of Systems   Constitutional: Negative.  Negative for chills, fatigue and fever.   HENT: Positive for congestion, sinus pain and sore throat.    Eyes: Negative.  Negative for pain.   Respiratory: Negative.  Negative for shortness of breath.    Cardiovascular: Negative.    Gastrointestinal: Negative.    Endocrine: Negative.    Genitourinary: Negative.    Musculoskeletal: Negative.  Negative for myalgias.   Skin: Negative.    Allergic/Immunologic: Negative.    Neurological: Negative.  Negative for dizziness.   Hematological: Negative.  Negative for adenopathy.   Psychiatric/Behavioral: Negative.    All other systems reviewed and are negative.      Objective     Vitals:    05/04/21 1426   BP: 156/84   Pulse: 71   Resp: 14   Temp: 97 °F (36.1 °C)   SpO2: 99%       Physical Exam  Vitals and nursing note reviewed.   Constitutional:       Appearance: She is well-developed.   HENT:      Head: Normocephalic and atraumatic.      Right Ear: Tympanic membrane and ear canal normal.      Left Ear: Tympanic membrane and ear canal normal.      Nose: Mucosal edema and rhinorrhea present.      Right Sinus: Maxillary sinus tenderness and frontal sinus tenderness present.      Left Sinus: Maxillary sinus tenderness and frontal sinus tenderness present.      Mouth/Throat:       Pharynx: No oropharyngeal exudate.   Eyes:      Pupils: Pupils are equal, round, and reactive to light.   Cardiovascular:      Rate and Rhythm: Normal rate and regular rhythm.   Pulmonary:      Effort: Pulmonary effort is normal.      Breath sounds: Normal breath sounds.         Assessment/Plan     Diagnoses and all orders for this visit:    1. Acute sinusitis, recurrence not specified, unspecified location (Primary)  -     cefdinir (OMNICEF) 300 MG capsule; Take 1 capsule by mouth 2 (Two) Times a Day.  Dispense: 20 capsule; Refill: 0  -     azelastine (ASTELIN) 0.1 % nasal spray; 2 sprays into the nostril(s) as directed by provider 2 (Two) Times a Day. Use in each nostril as directed  Dispense: 30 mL; Refill: 12       Answers for HPI/ROS submitted by the patient on 5/4/2021  Please describe your symptoms.: Nasal drainage and odd sensations in the head.  They are better since completing the first round of medicine, but still lingering.  Have you had these symptoms before?: Yes  How long have you been having these symptoms?: Greater than 2 weeks  Please list any medications you are currently taking for this condition.: Completed one round (10 days) of Cefdinir 300 mg.  Please describe any probable cause for these symptoms. : Allergies /sinus  What is the primary reason for your visit?: Other

## 2021-05-25 ENCOUNTER — OFFICE VISIT (OUTPATIENT)
Dept: FAMILY MEDICINE CLINIC | Facility: CLINIC | Age: 76
End: 2021-05-25

## 2021-05-25 VITALS
HEART RATE: 77 BPM | HEIGHT: 67 IN | OXYGEN SATURATION: 100 % | DIASTOLIC BLOOD PRESSURE: 78 MMHG | RESPIRATION RATE: 15 BRPM | SYSTOLIC BLOOD PRESSURE: 140 MMHG | BODY MASS INDEX: 21.12 KG/M2 | WEIGHT: 134.6 LBS | TEMPERATURE: 98.7 F

## 2021-05-25 DIAGNOSIS — Z12.11 COLON CANCER SCREENING: ICD-10-CM

## 2021-05-25 DIAGNOSIS — Z00.00 MEDICARE ANNUAL WELLNESS VISIT, SUBSEQUENT: Primary | ICD-10-CM

## 2021-05-25 DIAGNOSIS — M05.9 RHEUMATOID ARTHRITIS WITH POSITIVE RHEUMATOID FACTOR, INVOLVING UNSPECIFIED SITE (HCC): ICD-10-CM

## 2021-05-25 DIAGNOSIS — E78.5 DYSLIPIDEMIA: ICD-10-CM

## 2021-05-25 DIAGNOSIS — N95.1 MENOPAUSAL SYNDROME: ICD-10-CM

## 2021-05-25 DIAGNOSIS — J30.1 SEASONAL ALLERGIC RHINITIS DUE TO POLLEN: ICD-10-CM

## 2021-05-25 PROCEDURE — G0439 PPPS, SUBSEQ VISIT: HCPCS | Performed by: PHYSICIAN ASSISTANT

## 2021-05-25 PROCEDURE — 1170F FXNL STATUS ASSESSED: CPT | Performed by: PHYSICIAN ASSISTANT

## 2021-05-25 PROCEDURE — 1126F AMNT PAIN NOTED NONE PRSNT: CPT | Performed by: PHYSICIAN ASSISTANT

## 2021-05-25 PROCEDURE — 1159F MED LIST DOCD IN RCRD: CPT | Performed by: PHYSICIAN ASSISTANT

## 2021-05-25 RX ORDER — ESTRADIOL 0.05 MG/D
1 PATCH TRANSDERMAL WEEKLY
Qty: 12 EACH | Refills: 4 | Status: SHIPPED | OUTPATIENT
Start: 2021-05-25 | End: 2022-05-27 | Stop reason: SDUPTHER

## 2021-05-25 NOTE — PROGRESS NOTES
The ABCs of the Annual Wellness Visit  Subsequent Medicare Wellness Visit    Chief Complaint   Patient presents with   • Medicare Wellness-subsequent       Subjective   History of Present Illness:  Roxie Bruce is a 75 y.o. female who presents for a Subsequent Medicare Wellness Visit.  Patient is feeling well, exercises 4-6 times a week [employer].  HEALTH RISK ASSESSMENT    Recent Hospitalizations:  No hospitalization(s) within the last year.    Current Medical Providers:  Patient Care Team:  Justus Esquivel MD as PCP - General (Family Medicine)  Parker Germain MD as Consulting Physician (Ophthalmology)    Smoking Status:  Social History     Tobacco Use   Smoking Status Never Smoker   Smokeless Tobacco Never Used       Alcohol Consumption:  Social History     Substance and Sexual Activity   Alcohol Use Yes   • Types: 1 - 2 Glasses of wine per week    Comment: rarely       Depression Screen:   PHQ-2/PHQ-9 Depression Screening 5/25/2021   Little interest or pleasure in doing things 0   Feeling down, depressed, or hopeless 0   Trouble falling or staying asleep, or sleeping too much -   Feeling tired or having little energy -   Poor appetite or overeating -   Feeling bad about yourself - or that you are a failure or have let yourself or your family down -   Trouble concentrating on things, such as reading the newspaper or watching television -   Moving or speaking so slowly that other people could have noticed. Or the opposite - being so fidgety or restless that you have been moving around a lot more than usual -   Thoughts that you would be better off dead, or of hurting yourself in some way -   Total Score 0   If you checked off any problems, how difficult have these problems made it for you to do your work, take care of things at home, or get along with other people? -       Fall Risk Screen:  STEADI Fall Risk Assessment was completed, and patient is at LOW risk for falls.Assessment completed  on:5/25/2021    Health Habits and Functional and Cognitive Screening:  Functional & Cognitive Status 5/25/2021   Do you have difficulty preparing food and eating? No   Do you have difficulty bathing yourself, getting dressed or grooming yourself? No   Do you have difficulty using the toilet? No   Do you have difficulty moving around from place to place? No   Do you have trouble with steps or getting out of a bed or a chair? No   Current Diet Well Balanced Diet   Dental Exam Not up to date   Eye Exam Not up to date   Exercise (times per week) 7 times per week   Current Exercise Activities Include Cardiovasular Workout on Exercise Equipment   Do you need help using the phone?  No   Are you deaf or do you have serious difficulty hearing?  No   Do you need help with transportation? No   Do you need help shopping? No   Do you need help preparing meals?  No   Do you need help with housework?  No   Do you need help with laundry? No   Do you need help taking your medications? No   Do you need help managing money? No   Do you ever drive or ride in a car without wearing a seat belt? No   Have you felt unusual stress, anger or loneliness in the last month? No   Who do you live with? Spouse   If you need help, do you have trouble finding someone available to you? No   Have you been bothered in the last four weeks by sexual problems? No   Do you have difficulty concentrating, remembering or making decisions? No         Does the patient have evidence of cognitive impairment? No    Asprin use counseling:Does not need ASA (and currently is not on it)    Age-appropriate Screening Schedule:  Refer to the list below for future screening recommendations based on patient's age, sex and/or medical conditions. Orders for these recommended tests are listed in the plan section. The patient has been provided with a written plan.    Health Maintenance   Topic Date Due   • DXA SCAN  05/25/2021 (Originally 1945)   • INFLUENZA VACCINE   08/01/2021   • MAMMOGRAM  08/13/2022   • TDAP/TD VACCINES (2 - Td or Tdap) 03/30/2027   • ZOSTER VACCINE  Discontinued          The following portions of the patient's history were reviewed and updated as appropriate: current medications, past family history, past medical history, past social history, past surgical history and problem list.    Outpatient Medications Prior to Visit   Medication Sig Dispense Refill   • azelastine (ASTELIN) 0.1 % nasal spray 2 sprays into the nostril(s) as directed by provider 2 (Two) Times a Day. Use in each nostril as directed 30 mL 12   • Calcium Carbonate (CALTRATE 600 PO) Take 1 capsule by mouth Daily.     • calcium carbonate (Tums) 500 MG chewable tablet Chew 1 tablet Daily.     • cholecalciferol (VITAMIN D3) 1000 UNITS tablet Take 1,000 Units by mouth Daily.     • fluticasone (FLONASE) 50 MCG/ACT nasal spray 1 spray into the nostril(s) as directed by provider Daily. 1 spray each nostril daily     • FOLIC ACID PO Take 1 tablet/day by mouth.     • methotrexate 2.5 MG tablet 2.5 mg 1 (One) Time Per Week.     • estradiol (CLIMARA) 0.05 MG/24HR patch Place 1 patch on the skin as directed by provider 1 (One) Time Per Week. 12 each 0   • cefdinir (OMNICEF) 300 MG capsule Take 1 capsule by mouth 2 (Two) Times a Day. 20 capsule 0     No facility-administered medications prior to visit.       Patient Active Problem List   Diagnosis   • Essential hypertension   • Menopausal and female climacteric states   • Chest pain       Advanced Care Planning:  ACP discussion was held with the patient during this visit. Patient does not have an advance directive, information provided.    Review of Systems    Compared to one year ago, the patient feels her physical health is the same.  Compared to one year ago, the patient feels her mental health is the same.    Reviewed chart for potential of high risk medication in the elderly: yes  Reviewed chart for potential of harmful drug interactions in the  "elderly:yes    Objective         Vitals:    05/25/21 1011   BP: 140/78   Pulse: 77   Resp: 15   Temp: 98.7 °F (37.1 °C)   TempSrc: Infrared   SpO2: 100%   Weight: 61.1 kg (134 lb 9.6 oz)   Height: 168.9 cm (66.5\")   PainSc: 0-No pain       Body mass index is 21.4 kg/m².  Discussed the patient's BMI with her. The BMI is in the acceptable range.    Physical Exam          Assessment/Plan   Medicare Risks and Personalized Health Plan  CMS Preventative Services Quick Reference  Immunizations Discussed/Encouraged (specific immunizations; Pneumococcal 23 )    The above risks/problems have been discussed with the patient.  Pertinent information has been shared with the patient in the After Visit Summary.  Follow up plans and orders are seen below in the Assessment/Plan Section.    Diagnoses and all orders for this visit:    1. Medicare annual wellness visit, subsequent (Primary)    2. Dyslipidemia  -     Lipid Panel; Future    3. Colon cancer screening  -     Cologuard - Stool, Per Rectum; Future    4. Rheumatoid arthritis with positive rheumatoid factor, involving unspecified site (CMS/[facility])    5. Seasonal allergic rhinitis due to pollen    6. Menopausal and female climacteric states    Other orders  -     estradiol (CLIMARA) 0.05 MG/24HR patch; Place 1 patch on the skin as directed by provider 1 (One) Time Per Week.  Dispense: 12 each; Refill: 4      Follow Up:  Return in about 1 year (around 5/25/2022) for Medicare Wellness.     An After Visit Summary and PPPS were given to the patient.       Pneumovax 23 given today, patient will continue following up with rheumatology regularly for her rheumatoid arthritis, will discuss ordering a DEXA scan with her rheumatologist this summer.  Cologuard ordered due to difficulty with colonoscopy in 2016 and no polyps seen, had barium enema at that time due to difficulty with colonoscopy.        "

## 2021-05-26 ENCOUNTER — PRIOR AUTHORIZATION (OUTPATIENT)
Dept: FAMILY MEDICINE CLINIC | Facility: CLINIC | Age: 76
End: 2021-05-26

## 2021-05-26 NOTE — TELEPHONE ENCOUNTER
Received fax today requesting PA on Estradiol Weekly Patches.     Key: [id number]    Approved  Dates: 04/26/2021-5/26/2022

## 2021-05-27 ENCOUNTER — LAB (OUTPATIENT)
Dept: LAB | Facility: HOSPITAL | Age: 76
End: 2021-05-27

## 2021-05-27 DIAGNOSIS — E78.5 DYSLIPIDEMIA: ICD-10-CM

## 2021-05-27 LAB
CHOLEST SERPL-MCNC: 210 MG/DL (ref 0–200)
HDLC SERPL-MCNC: 101 MG/DL (ref 40–60)
LDLC SERPL CALC-MCNC: 99 MG/DL (ref 0–100)
LDLC/HDLC SERPL: 0.97 {RATIO}
TRIGL SERPL-MCNC: 54 MG/DL (ref 0–150)
VLDLC SERPL-MCNC: 10 MG/DL (ref 5–40)

## 2021-05-27 PROCEDURE — 80061 LIPID PANEL: CPT

## 2022-02-10 ENCOUNTER — TRANSCRIBE ORDERS (OUTPATIENT)
Dept: ADMINISTRATIVE | Facility: HOSPITAL | Age: 77
End: 2022-02-10

## 2022-02-10 DIAGNOSIS — Z12.31 VISIT FOR SCREENING MAMMOGRAM: Primary | ICD-10-CM

## 2022-03-18 ENCOUNTER — HOSPITAL ENCOUNTER (OUTPATIENT)
Dept: MAMMOGRAPHY | Facility: HOSPITAL | Age: 77
Discharge: HOME | End: 2022-03-18
Admitting: FAMILY MEDICINE

## 2022-03-18 DIAGNOSIS — Z12.31 VISIT FOR SCREENING MAMMOGRAM: ICD-10-CM

## 2022-03-18 PROCEDURE — 77067 SCR MAMMO BI INCL CAD: CPT | Performed by: RADIOLOGY

## 2022-03-18 PROCEDURE — 77063 BREAST TOMOSYNTHESIS BI: CPT

## 2022-03-18 PROCEDURE — 77067 SCR MAMMO BI INCL CAD: CPT

## 2022-03-18 PROCEDURE — 77063 BREAST TOMOSYNTHESIS BI: CPT | Performed by: RADIOLOGY

## 2022-05-27 ENCOUNTER — OFFICE VISIT (OUTPATIENT)
Dept: FAMILY MEDICINE CLINIC | Facility: CLINIC | Age: 77
End: 2022-05-27

## 2022-05-27 VITALS
BODY MASS INDEX: 19.93 KG/M2 | HEART RATE: 80 BPM | TEMPERATURE: 97.2 F | DIASTOLIC BLOOD PRESSURE: 78 MMHG | WEIGHT: 127 LBS | OXYGEN SATURATION: 99 % | SYSTOLIC BLOOD PRESSURE: 142 MMHG | HEIGHT: 67 IN

## 2022-05-27 DIAGNOSIS — M05.9 RHEUMATOID ARTHRITIS WITH POSITIVE RHEUMATOID FACTOR, INVOLVING UNSPECIFIED SITE: ICD-10-CM

## 2022-05-27 DIAGNOSIS — I10 PRIMARY HYPERTENSION: ICD-10-CM

## 2022-05-27 DIAGNOSIS — Z00.00 MEDICARE ANNUAL WELLNESS VISIT, SUBSEQUENT: Primary | ICD-10-CM

## 2022-05-27 DIAGNOSIS — J30.2 SEASONAL ALLERGIC RHINITIS, UNSPECIFIED TRIGGER: ICD-10-CM

## 2022-05-27 PROCEDURE — 1159F MED LIST DOCD IN RCRD: CPT | Performed by: PHYSICIAN ASSISTANT

## 2022-05-27 PROCEDURE — 1125F AMNT PAIN NOTED PAIN PRSNT: CPT | Performed by: PHYSICIAN ASSISTANT

## 2022-05-27 PROCEDURE — G0439 PPPS, SUBSEQ VISIT: HCPCS | Performed by: PHYSICIAN ASSISTANT

## 2022-05-27 PROCEDURE — 1170F FXNL STATUS ASSESSED: CPT | Performed by: PHYSICIAN ASSISTANT

## 2022-05-27 PROCEDURE — 93000 ELECTROCARDIOGRAM COMPLETE: CPT | Performed by: PHYSICIAN ASSISTANT

## 2022-05-27 RX ORDER — ESTRADIOL 0.05 MG/D
1 PATCH TRANSDERMAL WEEKLY
Qty: 12 EACH | Refills: 4 | Status: SHIPPED | OUTPATIENT
Start: 2022-05-27

## 2022-05-27 RX ORDER — AZELASTINE 1 MG/ML
2 SPRAY, METERED NASAL 2 TIMES DAILY
Qty: 30 ML | Refills: 12 | Status: SHIPPED | OUTPATIENT
Start: 2022-05-27

## 2022-05-27 NOTE — PROGRESS NOTES
The ABCs of the Annual Wellness Visit  Subsequent Medicare Wellness Visit    Chief Complaint   Patient presents with   • Medicare Wellness-subsequent     Medicare Wellness Exam      Subjective   History of Present Illness:  Roxie Bruce is a 76 y.o. female who presents for a Subsequent Medicare Wellness Visit.    Patient is a 76-year-old female seen today for an annual wellness visit.    The patient reports she is doing well. She states her oxygen saturation has been 99 percent and running between 60 and 70. The patient reports her toenails were not growing out and they were blurry. She states she has started going to [facility] Mar[facility]quin [facility] and he is trimming the toenails every 9 weeks. The patient reports she has another appointment. She denies having to have any kind of surgery on her toes. The patient reports she was told to put Vaseline on them. She states she is not a medicine taker or a user, but that seems to be helping.     The patient reports she is still seeing her rheumatologist for her rheumatoid arthritis. She states she has blood work every 2 months. The patient reports she tries to go to [employer] 3 to 5 days a week. She states she does upper body and then she does the treadmill for a mile at 3.7. The patient reports she does some strength training. She states it has been very helpful. The patient reports she does not take an aspirin a day. She states she is very careful to david to make sure she takes the methotrexate as the doctor has said. The patient reports she does not have an advance directive already. She states she did have a bone density scan on 08/25/2021 at the [facility]. The patient reports she had a mammogram on 03/18/2022 and everything was good.     The patient reports she did Cologuard last year that was normal, and she has a small colon. She states she has to go a lot during the day with small stools. The patient reports it has been a normal pattern for her for  a long time. The patient reports she has some sensation from the hiatal hernia but denies any trouble swallowing food or bloating.     The patient reports her dermatology appointment looked good.     She states when she had a terrible sinus infection, it felt like her scalp was full of fluid and when she would lay down, it was like it would shift. She states it is much better, but she still has that sensation on the posterior part of her head. The patient reports she has drainage constantly and sometimes her nose will drip. The patient reports she uses Astelin nasal spray every day. She states she was scared to use it until she checked with it.     The patient reports she is still on the estrogen patch and she feels like it is still helpful. She states her bone density has been good. She states she had cataract surgery. The patient reports she has not done that dentist for a couple of years since COVID-19. She states she needs to do that.     The following portions of the patient's history were reviewed and   updated as appropriate: allergies, past family history, past medical history, past social history, past surgical history and problem list.    Compared to one year ago, the patient feels her physical   health is the same.    Compared to one year ago, the patient feels her mental   health is the same.    Recent Hospitalizations:  She was not admitted to the hospital during the last year.       Current Medical Providers:  Patient Care Team:  Justus Esquivel MD as PCP - General (Family Medicine)  Parker Germain MD as Consulting Physician (Ophthalmology)    Outpatient Medications Prior to Visit   Medication Sig Dispense Refill   • Calcium Carbonate (CALTRATE 600 PO) Take 1 capsule by mouth Daily.     • calcium carbonate (TUMS) 500 MG chewable tablet Chew 1 tablet Daily.     • cholecalciferol (VITAMIN D3) 1000 UNITS tablet Take 1,000 Units by mouth Daily.     • Diclofenac Sodium (VOLTAREN EX) Apply   "topically.     • FOLIC ACID PO Take 1 tablet/day by mouth.     • methotrexate 2.5 MG tablet 2.5 mg 1 (One) Time Per Week.     • Probiotic Product (PROBIOTIC ADVANCED PO) Take  by mouth.     • azelastine (ASTELIN) 0.1 % nasal spray 2 sprays into the nostril(s) as directed by provider 2 (Two) Times a Day. Use in each nostril as directed 30 mL 12   • estradiol (CLIMARA) 0.05 MG/24HR patch Place 1 patch on the skin as directed by provider 1 (One) Time Per Week. 12 each 4   • fluticasone (FLONASE) 50 MCG/ACT nasal spray 1 spray into the nostril(s) as directed by provider Daily. 1 spray each nostril daily       No facility-administered medications prior to visit.       No opioid medication identified on active medication list. I have reviewed chart for other potential  high risk medication/s and harmful drug interactions in the elderly.          Aspirin is not on active medication list.  Aspirin use is contraindicated for this patient due to: current NSAID therapy.  .    Patient Active Problem List   Diagnosis   • Essential hypertension   • Menopausal and female climacteric states   • Chest pain     Advance Care Planning  has NO advanced directive - information provided to the patient today    Review of Systems   Constitutional: Negative for fatigue and unexpected weight change.   HENT: Positive for congestion.    Respiratory: Negative for cough, chest tightness and shortness of breath.    Cardiovascular: Negative for chest pain, palpitations and leg swelling.   Gastrointestinal: Negative for nausea.   Musculoskeletal: Positive for arthralgias ( chronically stable, sees rheumatology).   Skin: Negative for color change and rash.   Neurological: Negative for dizziness, syncope, weakness and headaches.         Objective      Vitals:    05/27/22 1015   BP: 142/78   Pulse: 80   Temp: 97.2 °F (36.2 °C)   SpO2: 99%   Weight: 57.6 kg (127 lb)   Height: 168.9 cm (66.5\")   PainSc:   2     BMI Readings from Last 1 Encounters: "   05/27/22 20.19 kg/m²     BMI is within normal parameters. No follow-up required.    Does the patient have evidence of cognitive impairment? No    Physical Exam  Vitals and nursing note reviewed.   Constitutional:       General: She is not in acute distress.     Appearance: Normal appearance. She is well-developed. She is not ill-appearing, toxic-appearing or diaphoretic.   HENT:      Head: Normocephalic and atraumatic.   Neck:      Vascular: No carotid bruit or JVD.   Cardiovascular:      Rate and Rhythm: Normal rate and regular rhythm.      Pulses: Normal pulses.      Heart sounds: Normal heart sounds. No murmur heard.  Pulmonary:      Effort: Pulmonary effort is normal. No respiratory distress.      Breath sounds: Normal breath sounds.   Abdominal:      Palpations: Abdomen is soft.      Tenderness: There is no abdominal tenderness.   Skin:     General: Skin is warm and dry.   Neurological:      Mental Status: She is alert.                 ECG 12 Lead    Date/Time: 5/27/2022 12:36 PM  Performed by: Mariangel Holt PA-C  Authorized by: Mariangel Holt PA-C   Comparison: not compared with previous ECG   Rhythm: sinus rhythm  Rate: normal  BPM: 66  Conduction: conduction normal  ST Segments: ST segments normal  T Waves: T waves normal  QRS axis: normal  Other: no other findings    Clinical impression: normal ECG          HEALTH RISK ASSESSMENT    Smoking Status:  Social History     Tobacco Use   Smoking Status Never Smoker   Smokeless Tobacco Never Used     Alcohol Consumption:  Social History     Substance and Sexual Activity   Alcohol Use Yes   • Types: 1 - 2 Glasses of wine per week    Comment: rarely     Fall Risk Screen:    STEADI Fall Risk Assessment was completed, and patient is at LOW risk for falls.Assessment completed on:5/27/2022    Depression Screening:  PHQ-2/PHQ-9 Depression Screening 5/27/2022   Retired PHQ-9 Total Score -   Retired Total Score -   Little Interest or Pleasure in Doing Things  0-->not at all   Feeling Down, Depressed or Hopeless 0-->not at all   PHQ-9: Brief Depression Severity Measure Score 0       Health Habits and Functional and Cognitive Screening:  Functional & Cognitive Status 5/27/2022   Do you have difficulty preparing food and eating? No   Do you have difficulty bathing yourself, getting dressed or grooming yourself? No   Do you have difficulty using the toilet? No   Do you have difficulty moving around from place to place? No   Do you have trouble with steps or getting out of a bed or a chair? No   Current Diet Well Balanced Diet   Dental Exam Not up to date   Eye Exam Up to date   Exercise (times per week) 0 times per week   Current Exercises Include No Regular Exercise   Current Exercise Activities Include -   Do you need help using the phone?  No   Are you deaf or do you have serious difficulty hearing?  No   Do you need help with transportation? No   Do you need help shopping? No   Do you need help preparing meals?  No   Do you need help with housework?  No   Do you need help with laundry? No   Do you need help taking your medications? No   Do you need help managing money? No   Do you ever drive or ride in a car without wearing a seat belt? No   Have you felt unusual stress, anger or loneliness in the last month? No   Who do you live with? Spouse   If you need help, do you have trouble finding someone available to you? No   Have you been bothered in the last four weeks by sexual problems? No   Do you have difficulty concentrating, remembering or making decisions? No       Age-appropriate Screening Schedule:  Refer to the list below for future screening recommendations based on patient's age, sex and/or medical conditions. Orders for these recommended tests are listed in the plan section. The patient has been provided with a written plan.    Health Maintenance   Topic Date Due   • INFLUENZA VACCINE  08/01/2022   • DXA SCAN  08/25/2023   • MAMMOGRAM  03/18/2024   • TDAP/TD  VACCINES (2 - Td or Tdap) 03/30/2027   • ZOSTER VACCINE  Discontinued              Assessment & Plan     CMS Preventative Services Quick Reference  Risk Factors Identified During Encounter  Cardiovascular Disease  The above risks/problems have been discussed with the patient.  Follow up actions/plans if indicated are seen below in the Assessment/Plan Section.  Pertinent information has been shared with the patient in the After Visit Summary.    Diagnoses and all orders for this visit:    1. Medicare annual wellness visit, subsequent (Primary)    2. Rheumatoid arthritis with positive rheumatoid factor, involving unspecified site (HCC)    3. Primary hypertension  -     Lipid Panel; Future    4. Seasonal allergic rhinitis, unspecified trigger  -     azelastine (ASTELIN) 0.1 % nasal spray; 2 sprays into the nostril(s) as directed by provider 2 (Two) Times a Day. Use in each nostril as directed  Dispense: 30 mL; Refill: 12    Other orders  -     estradiol (CLIMARA) 0.05 MG/24HR patch; Place 1 patch on the skin as directed by provider 1 (One) Time Per Week.  Dispense: 12 each; Refill: 4  -     ECG 12 Lead      The patient is doing well overall. She is up to date on her mammogram and bone density scan. She is up to date with her eye exam. Her pressure is well controlled at this time. She will continue her current medication regimen. She will continue to follow up with her rheumatologist. She will continue to take Astelin nasal spray daily. I will send a prescription for 1 year's worth of refills. She does not need a colonoscopy anymore as long as the Cologuard is fine. If it shows something abnormal, we will refer her to a specialist. She will have a lipid panel done at [facility] and we will get an EKG today.    Follow Up:  Return in about 1 year (around 5/27/2023) for Medicare Wellness.     An After Visit Summary and PPPS were given to the patient.         Transcribed from ambient dictation for Mariangel Holt PA-C by  Pavithra Mathews.  05/27/22   13:53 EDT    Patient verbalized consent to the visit recording.

## 2022-05-31 ENCOUNTER — TELEPHONE (OUTPATIENT)
Dept: FAMILY MEDICINE CLINIC | Facility: CLINIC | Age: 77
End: 2022-05-31

## 2022-05-31 RX ORDER — BROMPHENIRAMINE MALEATE, PSEUDOEPHEDRINE HYDROCHLORIDE, AND DEXTROMETHORPHAN HYDROBROMIDE 2; 30; 10 MG/5ML; MG/5ML; MG/5ML
5 SYRUP ORAL 4 TIMES DAILY PRN
Qty: 180 ML | Refills: 0 | Status: SHIPPED | OUTPATIENT
Start: 2022-05-31 | End: 2022-07-27

## 2022-05-31 RX ORDER — AZITHROMYCIN 250 MG/1
TABLET, FILM COATED ORAL
Qty: 6 TABLET | Refills: 0 | Status: SHIPPED | OUTPATIENT
Start: 2022-05-31 | End: 2022-07-27

## 2022-05-31 RX ORDER — METHYLPREDNISOLONE 4 MG/1
TABLET ORAL
Qty: 1 EACH | Refills: 0 | Status: SHIPPED | OUTPATIENT
Start: 2022-05-31 | End: 2022-07-27

## 2022-07-27 ENCOUNTER — OFFICE VISIT (OUTPATIENT)
Dept: FAMILY MEDICINE CLINIC | Facility: CLINIC | Age: 77
End: 2022-07-27

## 2022-07-27 ENCOUNTER — LAB (OUTPATIENT)
Dept: LAB | Facility: HOSPITAL | Age: 77
End: 2022-07-27

## 2022-07-27 VITALS
SYSTOLIC BLOOD PRESSURE: 132 MMHG | DIASTOLIC BLOOD PRESSURE: 82 MMHG | RESPIRATION RATE: 14 BRPM | OXYGEN SATURATION: 99 % | BODY MASS INDEX: 19.43 KG/M2 | WEIGHT: 123.8 LBS | TEMPERATURE: 96.4 F | HEIGHT: 67 IN | HEART RATE: 110 BPM

## 2022-07-27 DIAGNOSIS — E87.5 HYPERKALEMIA: Primary | ICD-10-CM

## 2022-07-27 DIAGNOSIS — E87.5 HYPERKALEMIA: ICD-10-CM

## 2022-07-27 PROBLEM — M05.20 RHEUMATOID ARTERITIS (HCC): Status: ACTIVE | Noted: 2019-01-15

## 2022-07-27 LAB
ANION GAP SERPL CALCULATED.3IONS-SCNC: 9 MMOL/L (ref 5–15)
BUN SERPL-MCNC: 15 MG/DL (ref 8–23)
BUN/CREAT SERPL: 21.4 (ref 7–25)
CALCIUM SPEC-SCNC: 9.5 MG/DL (ref 8.6–10.5)
CHLORIDE SERPL-SCNC: 98 MMOL/L (ref 98–107)
CO2 SERPL-SCNC: 29 MMOL/L (ref 22–29)
CREAT SERPL-MCNC: 0.7 MG/DL (ref 0.57–1)
EGFRCR SERPLBLD CKD-EPI 2021: 89.8 ML/MIN/1.73
GLUCOSE SERPL-MCNC: 109 MG/DL (ref 65–99)
POTASSIUM SERPL-SCNC: 4.1 MMOL/L (ref 3.5–5.2)
SODIUM SERPL-SCNC: 136 MMOL/L (ref 136–145)

## 2022-07-27 PROCEDURE — 36415 COLL VENOUS BLD VENIPUNCTURE: CPT

## 2022-07-27 PROCEDURE — 99213 OFFICE O/P EST LOW 20 MIN: CPT | Performed by: FAMILY MEDICINE

## 2022-07-27 PROCEDURE — 80048 BASIC METABOLIC PNL TOTAL CA: CPT

## 2022-07-27 NOTE — PROGRESS NOTES
Subjective   Roxie Bruce is a 77 y.o. female    Chief Complaint    Elevated potassium    History of Present Illness  The patient had blood work done [facility] the [facility] several weeks ago and was told that her potassium was elevated, and she should follow up here.    She reports she tested positive for COVID-19. The patient started on the medicine that night and she had mild symptoms after that.    She looked up diet restrictions and she can hardly have any healthy foods that she eats. She has not been eating bananas recently. The patient does cook with tomatoes. The patient goes to [employer] and she walks 1 mile on the treadmill 3 to 5 days a week. She does get tired in the afternoon, but that is usually after going to the gym and stuff around the house.    Recent EKG is normal.    The following portions of the patient's history were reviewed and updated as appropriate: allergies, current medications, past social history and problem list    Review of Systems   Constitutional: Positive for fatigue. Negative for fever.   HENT: Negative for congestion and trouble swallowing.    Respiratory: Positive for chest tightness. Negative for cough, choking and shortness of breath.    Cardiovascular: Positive for chest pain. Negative for palpitations.   Gastrointestinal: Negative for abdominal distention, abdominal pain and nausea.   Musculoskeletal: Negative for back pain.   Neurological: Negative for syncope.   Psychiatric/Behavioral: Negative for dysphoric mood. The patient is not nervous/anxious.        Objective     Vitals:    07/27/22 1326   BP: 132/82   Pulse: 110   Resp: 14   Temp: 96.4 °F (35.8 °C)   SpO2: 99%       Physical Exam  Vitals and nursing note reviewed.   Constitutional:       General: She is not in acute distress.     Appearance: Normal appearance. She is well-developed. She is not ill-appearing, toxic-appearing or diaphoretic.   Neck:      Vascular: No JVD.   Cardiovascular:      Rate and  Rhythm: Normal rate and regular rhythm.      Pulses: Normal pulses.      Heart sounds: Normal heart sounds. No murmur heard.  Pulmonary:      Effort: Pulmonary effort is normal. No respiratory distress.   Abdominal:      Palpations: Abdomen is soft. There is no mass.      Tenderness: There is no abdominal tenderness.   Musculoskeletal:         General: No swelling.   Skin:     General: Skin is warm and dry.   Neurological:      Mental Status: She is alert and oriented to person, place, and time.   Psychiatric:         Mood and Affect: Mood normal.         Behavior: Behavior normal.         Assessment & Plan   Problems Addressed this Visit    None     Visit Diagnoses     Hyperkalemia    -  Primary    Relevant Orders    Basic Metabolic Panel      Diagnoses       Codes Comments    Hyperkalemia    -  Primary ICD-10-CM: E87.5  ICD-9-CM: 276.7         Suspect this elevated potassium is related to specimen handling especially since so many specimens are being transported to [facility] before being run.        Transcribed from ambient dictation for R Gavin Esquivel MD by Nan Valdes.  07/27/22   15:03 EDT    Patient verbalized consent to the visit recording.

## 2022-09-02 LAB
AMBIG ABBREV LP DEFAULT: NORMAL
CHOLEST SERPL-MCNC: 229 MG/DL (ref 100–199)
HDLC SERPL-MCNC: 103 MG/DL
LDLC SERPL CALC-MCNC: 116 MG/DL (ref 0–99)
TRIGL SERPL-MCNC: 59 MG/DL (ref 0–149)
VLDLC SERPL CALC-MCNC: 10 MG/DL (ref 5–40)

## 2022-11-03 ENCOUNTER — LAB (OUTPATIENT)
Dept: LAB | Facility: HOSPITAL | Age: 77
End: 2022-11-03

## 2022-11-03 DIAGNOSIS — Z79.899 ENCOUNTER FOR LONG-TERM (CURRENT) USE OF OTHER MEDICATIONS: ICD-10-CM

## 2022-11-03 DIAGNOSIS — M05.79 SEROPOSITIVE RHEUMATOID ARTHRITIS OF MULTIPLE SITES: ICD-10-CM

## 2022-11-03 DIAGNOSIS — D89.9 DISEASE OF IMMUNE SYSTEM: Primary | ICD-10-CM

## 2022-11-03 LAB
ALBUMIN SERPL-MCNC: 4.5 G/DL (ref 3.5–5.2)
ALBUMIN/GLOB SERPL: 1.9 G/DL
ALP SERPL-CCNC: 60 U/L (ref 39–117)
ALT SERPL W P-5'-P-CCNC: 18 U/L (ref 1–33)
ANION GAP SERPL CALCULATED.3IONS-SCNC: 14.2 MMOL/L (ref 5–15)
AST SERPL-CCNC: 23 U/L (ref 1–32)
BASOPHILS # BLD AUTO: 0.03 10*3/MM3 (ref 0–0.2)
BASOPHILS NFR BLD AUTO: 0.5 % (ref 0–1.5)
BILIRUB SERPL-MCNC: 0.7 MG/DL (ref 0–1.2)
BUN SERPL-MCNC: 14 MG/DL (ref 8–23)
BUN/CREAT SERPL: 19.4 (ref 7–25)
CALCIUM SPEC-SCNC: 9.5 MG/DL (ref 8.6–10.5)
CHLORIDE SERPL-SCNC: 98 MMOL/L (ref 98–107)
CO2 SERPL-SCNC: 24.8 MMOL/L (ref 22–29)
CREAT SERPL-MCNC: 0.72 MG/DL (ref 0.57–1)
CRP SERPL-MCNC: <0.3 MG/DL (ref 0–0.5)
DEPRECATED RDW RBC AUTO: 41.5 FL (ref 37–54)
EGFRCR SERPLBLD CKD-EPI 2021: 86.8 ML/MIN/1.73
EOSINOPHIL # BLD AUTO: 0.1 10*3/MM3 (ref 0–0.4)
EOSINOPHIL NFR BLD AUTO: 1.8 % (ref 0.3–6.2)
ERYTHROCYTE [DISTWIDTH] IN BLOOD BY AUTOMATED COUNT: 12.4 % (ref 12.3–15.4)
ERYTHROCYTE [SEDIMENTATION RATE] IN BLOOD: 9 MM/HR (ref 0–30)
GLOBULIN UR ELPH-MCNC: 2.4 GM/DL
GLUCOSE SERPL-MCNC: 92 MG/DL (ref 65–99)
HCT VFR BLD AUTO: 39.9 % (ref 34–46.6)
HGB BLD-MCNC: 13.3 G/DL (ref 12–15.9)
IMM GRANULOCYTES # BLD AUTO: 0.02 10*3/MM3 (ref 0–0.05)
IMM GRANULOCYTES NFR BLD AUTO: 0.4 % (ref 0–0.5)
LYMPHOCYTES # BLD AUTO: 1.16 10*3/MM3 (ref 0.7–3.1)
LYMPHOCYTES NFR BLD AUTO: 20.4 % (ref 19.6–45.3)
MCH RBC QN AUTO: 31.2 PG (ref 26.6–33)
MCHC RBC AUTO-ENTMCNC: 33.3 G/DL (ref 31.5–35.7)
MCV RBC AUTO: 93.7 FL (ref 79–97)
MONOCYTES # BLD AUTO: 0.41 10*3/MM3 (ref 0.1–0.9)
MONOCYTES NFR BLD AUTO: 7.2 % (ref 5–12)
NEUTROPHILS NFR BLD AUTO: 3.96 10*3/MM3 (ref 1.7–7)
NEUTROPHILS NFR BLD AUTO: 69.7 % (ref 42.7–76)
NRBC BLD AUTO-RTO: 0 /100 WBC (ref 0–0.2)
PLATELET # BLD AUTO: 294 10*3/MM3 (ref 140–450)
PMV BLD AUTO: 10.6 FL (ref 6–12)
POTASSIUM SERPL-SCNC: 3.9 MMOL/L (ref 3.5–5.2)
PROT SERPL-MCNC: 6.9 G/DL (ref 6–8.5)
RBC # BLD AUTO: 4.26 10*6/MM3 (ref 3.77–5.28)
SODIUM SERPL-SCNC: 137 MMOL/L (ref 136–145)
WBC NRBC COR # BLD: 5.68 10*3/MM3 (ref 3.4–10.8)

## 2022-11-03 PROCEDURE — 85652 RBC SED RATE AUTOMATED: CPT

## 2022-11-03 PROCEDURE — 80053 COMPREHEN METABOLIC PANEL: CPT

## 2022-11-03 PROCEDURE — 85025 COMPLETE CBC W/AUTO DIFF WBC: CPT

## 2022-11-03 PROCEDURE — 36415 COLL VENOUS BLD VENIPUNCTURE: CPT

## 2022-11-03 PROCEDURE — 86140 C-REACTIVE PROTEIN: CPT

## 2022-12-29 ENCOUNTER — TRANSCRIBE ORDERS (OUTPATIENT)
Dept: LAB | Facility: HOSPITAL | Age: 77
End: 2022-12-29
Payer: MEDICARE

## 2022-12-29 ENCOUNTER — LAB (OUTPATIENT)
Dept: LAB | Facility: HOSPITAL | Age: 77
End: 2022-12-29
Payer: MEDICARE

## 2022-12-29 DIAGNOSIS — Z79.899 ENCOUNTER FOR LONG-TERM (CURRENT) USE OF OTHER MEDICATIONS: ICD-10-CM

## 2022-12-29 DIAGNOSIS — M05.79 SEROPOSITIVE RHEUMATOID ARTHRITIS OF MULTIPLE SITES: ICD-10-CM

## 2022-12-29 DIAGNOSIS — D89.9 DISEASE OF IMMUNE SYSTEM: ICD-10-CM

## 2022-12-29 DIAGNOSIS — D89.9 DISEASE OF IMMUNE SYSTEM: Primary | ICD-10-CM

## 2022-12-29 DIAGNOSIS — I10 PRIMARY HYPERTENSION: ICD-10-CM

## 2022-12-29 LAB
ALBUMIN SERPL-MCNC: 4.5 G/DL (ref 3.5–5.2)
ALBUMIN/GLOB SERPL: 1.9 G/DL
ALP SERPL-CCNC: 61 U/L (ref 39–117)
ALT SERPL W P-5'-P-CCNC: 19 U/L (ref 1–33)
ANION GAP SERPL CALCULATED.3IONS-SCNC: 12.1 MMOL/L (ref 5–15)
AST SERPL-CCNC: 26 U/L (ref 1–32)
BASOPHILS # BLD AUTO: 0.04 10*3/MM3 (ref 0–0.2)
BASOPHILS NFR BLD AUTO: 0.6 % (ref 0–1.5)
BILIRUB SERPL-MCNC: 0.7 MG/DL (ref 0–1.2)
BUN SERPL-MCNC: 20 MG/DL (ref 8–23)
BUN/CREAT SERPL: 28.2 (ref 7–25)
CALCIUM SPEC-SCNC: 9.5 MG/DL (ref 8.6–10.5)
CHLORIDE SERPL-SCNC: 98 MMOL/L (ref 98–107)
CHOLEST SERPL-MCNC: 223 MG/DL (ref 0–200)
CO2 SERPL-SCNC: 26.9 MMOL/L (ref 22–29)
CREAT SERPL-MCNC: 0.71 MG/DL (ref 0.57–1)
CRP SERPL-MCNC: <0.3 MG/DL (ref 0–0.5)
DEPRECATED RDW RBC AUTO: 44.4 FL (ref 37–54)
EGFRCR SERPLBLD CKD-EPI 2021: 88.2 ML/MIN/1.73
EOSINOPHIL # BLD AUTO: 0.07 10*3/MM3 (ref 0–0.4)
EOSINOPHIL NFR BLD AUTO: 1 % (ref 0.3–6.2)
ERYTHROCYTE [DISTWIDTH] IN BLOOD BY AUTOMATED COUNT: 12.9 % (ref 12.3–15.4)
GLOBULIN UR ELPH-MCNC: 2.4 GM/DL
GLUCOSE SERPL-MCNC: 96 MG/DL (ref 65–99)
HCT VFR BLD AUTO: 42 % (ref 34–46.6)
HDLC SERPL-MCNC: 109 MG/DL (ref 40–60)
HGB BLD-MCNC: 13.4 G/DL (ref 12–15.9)
IMM GRANULOCYTES # BLD AUTO: 0.02 10*3/MM3 (ref 0–0.05)
IMM GRANULOCYTES NFR BLD AUTO: 0.3 % (ref 0–0.5)
LDLC SERPL CALC-MCNC: 104 MG/DL (ref 0–100)
LDLC/HDLC SERPL: 0.94 {RATIO}
LYMPHOCYTES # BLD AUTO: 1.33 10*3/MM3 (ref 0.7–3.1)
LYMPHOCYTES NFR BLD AUTO: 19.8 % (ref 19.6–45.3)
MCH RBC QN AUTO: 30.4 PG (ref 26.6–33)
MCHC RBC AUTO-ENTMCNC: 31.9 G/DL (ref 31.5–35.7)
MCV RBC AUTO: 95.2 FL (ref 79–97)
MONOCYTES # BLD AUTO: 0.5 10*3/MM3 (ref 0.1–0.9)
MONOCYTES NFR BLD AUTO: 7.4 % (ref 5–12)
NEUTROPHILS NFR BLD AUTO: 4.76 10*3/MM3 (ref 1.7–7)
NEUTROPHILS NFR BLD AUTO: 70.9 % (ref 42.7–76)
NRBC BLD AUTO-RTO: 0 /100 WBC (ref 0–0.2)
PLATELET # BLD AUTO: 302 10*3/MM3 (ref 140–450)
PMV BLD AUTO: 11.5 FL (ref 6–12)
POTASSIUM SERPL-SCNC: 4.8 MMOL/L (ref 3.5–5.2)
PROT SERPL-MCNC: 6.9 G/DL (ref 6–8.5)
RBC # BLD AUTO: 4.41 10*6/MM3 (ref 3.77–5.28)
SODIUM SERPL-SCNC: 137 MMOL/L (ref 136–145)
TRIGL SERPL-MCNC: 59 MG/DL (ref 0–150)
VLDLC SERPL-MCNC: 10 MG/DL (ref 5–40)
WBC NRBC COR # BLD: 6.72 10*3/MM3 (ref 3.4–10.8)

## 2022-12-29 PROCEDURE — 86140 C-REACTIVE PROTEIN: CPT

## 2022-12-29 PROCEDURE — 80061 LIPID PANEL: CPT

## 2022-12-29 PROCEDURE — 80053 COMPREHEN METABOLIC PANEL: CPT

## 2022-12-29 PROCEDURE — 85025 COMPLETE CBC W/AUTO DIFF WBC: CPT

## 2022-12-29 PROCEDURE — 36415 COLL VENOUS BLD VENIPUNCTURE: CPT

## 2023-02-21 ENCOUNTER — TRANSCRIBE ORDERS (OUTPATIENT)
Dept: ADMINISTRATIVE | Facility: HOSPITAL | Age: 78
End: 2023-02-21
Payer: MEDICARE

## 2023-02-21 DIAGNOSIS — Z12.31 VISIT FOR SCREENING MAMMOGRAM: Primary | ICD-10-CM

## 2023-03-28 ENCOUNTER — LAB (OUTPATIENT)
Dept: LAB | Facility: HOSPITAL | Age: 78
End: 2023-03-28
Payer: MEDICARE

## 2023-03-28 ENCOUNTER — TRANSCRIBE ORDERS (OUTPATIENT)
Dept: LAB | Facility: HOSPITAL | Age: 78
End: 2023-03-28
Payer: MEDICARE

## 2023-03-28 DIAGNOSIS — Z79.899 ENCOUNTER FOR LONG-TERM (CURRENT) USE OF OTHER MEDICATIONS: ICD-10-CM

## 2023-03-28 DIAGNOSIS — M05.79 RHEUMATOID ARTHRITIS INVOLVING MULTIPLE SITES WITH POSITIVE RHEUMATOID FACTOR: ICD-10-CM

## 2023-03-28 DIAGNOSIS — D89.9 DISEASE OF IMMUNE SYSTEM: ICD-10-CM

## 2023-03-28 DIAGNOSIS — D89.9 DISEASE OF IMMUNE SYSTEM: Primary | ICD-10-CM

## 2023-03-28 LAB
ALBUMIN SERPL-MCNC: 4.7 G/DL (ref 3.5–5.2)
ALBUMIN/GLOB SERPL: 2.2 G/DL
ALP SERPL-CCNC: 59 U/L (ref 39–117)
ALT SERPL W P-5'-P-CCNC: 13 U/L (ref 1–33)
ANION GAP SERPL CALCULATED.3IONS-SCNC: 11.9 MMOL/L (ref 5–15)
AST SERPL-CCNC: 21 U/L (ref 1–32)
BASOPHILS # BLD AUTO: 0.05 10*3/MM3 (ref 0–0.2)
BASOPHILS NFR BLD AUTO: 0.8 % (ref 0–1.5)
BILIRUB SERPL-MCNC: 0.6 MG/DL (ref 0–1.2)
BUN SERPL-MCNC: 16 MG/DL (ref 8–23)
BUN/CREAT SERPL: 21.3 (ref 7–25)
CALCIUM SPEC-SCNC: 9.9 MG/DL (ref 8.6–10.5)
CHLORIDE SERPL-SCNC: 99 MMOL/L (ref 98–107)
CO2 SERPL-SCNC: 27.1 MMOL/L (ref 22–29)
CREAT SERPL-MCNC: 0.75 MG/DL (ref 0.57–1)
CRP SERPL-MCNC: <0.3 MG/DL (ref 0–0.5)
DEPRECATED RDW RBC AUTO: 42.2 FL (ref 37–54)
EGFRCR SERPLBLD CKD-EPI 2021: 82.6 ML/MIN/1.73
EOSINOPHIL # BLD AUTO: 0.09 10*3/MM3 (ref 0–0.4)
EOSINOPHIL NFR BLD AUTO: 1.4 % (ref 0.3–6.2)
ERYTHROCYTE [DISTWIDTH] IN BLOOD BY AUTOMATED COUNT: 12.5 % (ref 12.3–15.4)
ERYTHROCYTE [SEDIMENTATION RATE] IN BLOOD: 9 MM/HR (ref 0–30)
GLOBULIN UR ELPH-MCNC: 2.1 GM/DL
GLUCOSE SERPL-MCNC: 94 MG/DL (ref 65–99)
HCT VFR BLD AUTO: 40.5 % (ref 34–46.6)
HGB BLD-MCNC: 13.2 G/DL (ref 12–15.9)
IMM GRANULOCYTES # BLD AUTO: 0.02 10*3/MM3 (ref 0–0.05)
IMM GRANULOCYTES NFR BLD AUTO: 0.3 % (ref 0–0.5)
LYMPHOCYTES # BLD AUTO: 1.57 10*3/MM3 (ref 0.7–3.1)
LYMPHOCYTES NFR BLD AUTO: 24.6 % (ref 19.6–45.3)
MCH RBC QN AUTO: 31 PG (ref 26.6–33)
MCHC RBC AUTO-ENTMCNC: 32.6 G/DL (ref 31.5–35.7)
MCV RBC AUTO: 95.1 FL (ref 79–97)
MONOCYTES # BLD AUTO: 0.61 10*3/MM3 (ref 0.1–0.9)
MONOCYTES NFR BLD AUTO: 9.6 % (ref 5–12)
NEUTROPHILS NFR BLD AUTO: 4.04 10*3/MM3 (ref 1.7–7)
NEUTROPHILS NFR BLD AUTO: 63.3 % (ref 42.7–76)
NRBC BLD AUTO-RTO: 0 /100 WBC (ref 0–0.2)
PLATELET # BLD AUTO: 307 10*3/MM3 (ref 140–450)
PMV BLD AUTO: 11.1 FL (ref 6–12)
POTASSIUM SERPL-SCNC: 4.9 MMOL/L (ref 3.5–5.2)
PROT SERPL-MCNC: 6.8 G/DL (ref 6–8.5)
RBC # BLD AUTO: 4.26 10*6/MM3 (ref 3.77–5.28)
SODIUM SERPL-SCNC: 138 MMOL/L (ref 136–145)
WBC NRBC COR # BLD: 6.38 10*3/MM3 (ref 3.4–10.8)

## 2023-03-28 PROCEDURE — 80053 COMPREHEN METABOLIC PANEL: CPT

## 2023-03-28 PROCEDURE — 85025 COMPLETE CBC W/AUTO DIFF WBC: CPT

## 2023-03-28 PROCEDURE — 85652 RBC SED RATE AUTOMATED: CPT

## 2023-03-28 PROCEDURE — 86140 C-REACTIVE PROTEIN: CPT

## 2023-03-28 PROCEDURE — 36415 COLL VENOUS BLD VENIPUNCTURE: CPT

## 2023-03-29 ENCOUNTER — HOSPITAL ENCOUNTER (OUTPATIENT)
Dept: MAMMOGRAPHY | Facility: HOSPITAL | Age: 78
Discharge: HOME | End: 2023-03-29
Admitting: FAMILY MEDICINE
Payer: MEDICARE

## 2023-03-29 DIAGNOSIS — Z12.31 VISIT FOR SCREENING MAMMOGRAM: ICD-10-CM

## 2023-03-29 PROCEDURE — 77067 SCR MAMMO BI INCL CAD: CPT | Performed by: RADIOLOGY

## 2023-03-29 PROCEDURE — 77067 SCR MAMMO BI INCL CAD: CPT

## 2023-03-29 PROCEDURE — 77063 BREAST TOMOSYNTHESIS BI: CPT | Performed by: RADIOLOGY

## 2023-03-29 PROCEDURE — 77063 BREAST TOMOSYNTHESIS BI: CPT

## 2023-06-06 ENCOUNTER — OFFICE VISIT (OUTPATIENT)
Dept: FAMILY MEDICINE CLINIC | Facility: CLINIC | Age: 78
End: 2023-06-06
Payer: MEDICARE

## 2023-06-06 ENCOUNTER — LAB (OUTPATIENT)
Dept: FAMILY MEDICINE CLINIC | Facility: CLINIC | Age: 78
End: 2023-06-06
Payer: MEDICARE

## 2023-06-06 VITALS
BODY MASS INDEX: 20.21 KG/M2 | RESPIRATION RATE: 12 BRPM | WEIGHT: 128.8 LBS | TEMPERATURE: 98 F | OXYGEN SATURATION: 98 % | HEIGHT: 67 IN | HEART RATE: 79 BPM | SYSTOLIC BLOOD PRESSURE: 118 MMHG | DIASTOLIC BLOOD PRESSURE: 80 MMHG

## 2023-06-06 DIAGNOSIS — K57.30 DIVERTICULOSIS OF COLON: ICD-10-CM

## 2023-06-06 DIAGNOSIS — Z78.0 MENOPAUSE: ICD-10-CM

## 2023-06-06 DIAGNOSIS — Z00.00 ANNUAL PHYSICAL EXAM: Primary | ICD-10-CM

## 2023-06-06 DIAGNOSIS — I10 PRIMARY HYPERTENSION: ICD-10-CM

## 2023-06-06 DIAGNOSIS — J30.2 SEASONAL ALLERGIC RHINITIS, UNSPECIFIED TRIGGER: ICD-10-CM

## 2023-06-06 DIAGNOSIS — M05.9 RHEUMATOID ARTHRITIS WITH POSITIVE RHEUMATOID FACTOR, INVOLVING UNSPECIFIED SITE: ICD-10-CM

## 2023-06-06 DIAGNOSIS — Z00.00 MEDICARE ANNUAL WELLNESS VISIT, SUBSEQUENT: ICD-10-CM

## 2023-06-06 LAB
CHOLEST SERPL-MCNC: 226 MG/DL (ref 0–200)
DEPRECATED RDW RBC AUTO: 42.4 FL (ref 37–54)
ERYTHROCYTE [DISTWIDTH] IN BLOOD BY AUTOMATED COUNT: 12.8 % (ref 12.3–15.4)
HCT VFR BLD AUTO: 41.7 % (ref 34–46.6)
HDLC SERPL-MCNC: 108 MG/DL (ref 40–60)
HGB BLD-MCNC: 14.2 G/DL (ref 12–15.9)
LDLC SERPL CALC-MCNC: 107 MG/DL (ref 0–100)
LDLC/HDLC SERPL: 0.97 {RATIO}
MCH RBC QN AUTO: 31.6 PG (ref 26.6–33)
MCHC RBC AUTO-ENTMCNC: 34.1 G/DL (ref 31.5–35.7)
MCV RBC AUTO: 92.7 FL (ref 79–97)
PLATELET # BLD AUTO: 299 10*3/MM3 (ref 140–450)
PMV BLD AUTO: 10.9 FL (ref 6–12)
RBC # BLD AUTO: 4.5 10*6/MM3 (ref 3.77–5.28)
T4 FREE SERPL-MCNC: 1.1 NG/DL (ref 0.93–1.7)
TRIGL SERPL-MCNC: 65 MG/DL (ref 0–150)
TSH SERPL DL<=0.05 MIU/L-ACNC: 1.98 UIU/ML (ref 0.27–4.2)
VLDLC SERPL-MCNC: 11 MG/DL (ref 5–40)
WBC NRBC COR # BLD: 7.8 10*3/MM3 (ref 3.4–10.8)

## 2023-06-06 PROCEDURE — 3074F SYST BP LT 130 MM HG: CPT | Performed by: FAMILY MEDICINE

## 2023-06-06 PROCEDURE — 84439 ASSAY OF FREE THYROXINE: CPT | Performed by: FAMILY MEDICINE

## 2023-06-06 PROCEDURE — 84443 ASSAY THYROID STIM HORMONE: CPT | Performed by: FAMILY MEDICINE

## 2023-06-06 PROCEDURE — 99397 PER PM REEVAL EST PAT 65+ YR: CPT | Performed by: FAMILY MEDICINE

## 2023-06-06 PROCEDURE — 1159F MED LIST DOCD IN RCRD: CPT | Performed by: FAMILY MEDICINE

## 2023-06-06 PROCEDURE — 80061 LIPID PANEL: CPT | Performed by: FAMILY MEDICINE

## 2023-06-06 PROCEDURE — 1160F RVW MEDS BY RX/DR IN RCRD: CPT | Performed by: FAMILY MEDICINE

## 2023-06-06 PROCEDURE — 3079F DIAST BP 80-89 MM HG: CPT | Performed by: FAMILY MEDICINE

## 2023-06-06 PROCEDURE — 85027 COMPLETE CBC AUTOMATED: CPT | Performed by: FAMILY MEDICINE

## 2023-06-06 PROCEDURE — G0439 PPPS, SUBSEQ VISIT: HCPCS | Performed by: FAMILY MEDICINE

## 2023-06-06 PROCEDURE — 80053 COMPREHEN METABOLIC PANEL: CPT | Performed by: FAMILY MEDICINE

## 2023-06-06 RX ORDER — AZELASTINE 1 MG/ML
2 SPRAY, METERED NASAL 2 TIMES DAILY
Qty: 30 ML | Refills: 12 | Status: SHIPPED | OUTPATIENT
Start: 2023-06-06

## 2023-06-06 RX ORDER — ESTRADIOL 0.05 MG/D
1 PATCH TRANSDERMAL WEEKLY
Qty: 12 EACH | Refills: 4 | Status: SHIPPED | OUTPATIENT
Start: 2023-06-06

## 2023-06-06 NOTE — PROGRESS NOTES
Subjective   Roxie Bruce is a 77 y.o. female    Chief Complaint   Annual physical exam  Medicare annual wellness visit subsequent  Hypertension  Allergic rhinitis  Rheumatoid arthritis    History of Present Illness  The patient presents today for her annual physical examination as well as her Medicare wellness visit. Also, she is here to follow up regarding her hypertension, allergic rhinitis, and rheumatoid arthritis. She is fasting for laboratory studies. We will update her health maintenance issues while visiting today.    She reports she has some heartburn issues. The patient said she wants to make sure it is not something else going on other than her tiny twisted colon. She said sometimes she feels like she needs to have a bowel movement and there is a little sensation there. The patient said sometimes she feels it up under her abdomen. She said it is not all day, but it is not every day. The patient said she has belching, burping, and expelling gas. She went through a time period a few weeks ago where it was more and it seems to not be as much. The patient said her paternal family had ulcers. She said she has a couple of hernia. The patient said the scar tissue is pushing up. She had a Cologuard a couple of years ago and it came back normal. The patient said she had to have the air barium the last time because he was afraid that he would puncture and he could not even get a pediatric scope. She said she has had diverticulum for a long time. The patient said her sister has diverticulitis sometimes. She has not avoided tea or strawberry. The patient said it has not been to the point where she felt like it was maybe the Idust. She does not take all for it. The patient said she takes 1 time at night and then she takes 10 mL of liquid antacid before she goes to bed. She has tried 2 to 3 bottles of Metamucil in the past. The patient said she eats the whole fiber on her cereal. She said she has a bowel  movement all the time because sometimes there is a size of abrasions or a prune.    The patient said ever since she had her really bad sinus infection, it was so bad that if she lays down, she could feel a sloshing around. The patient said it is much better. She still feels a little division in the 2 sides of her head. The patient said she uses the spray once at night and that helps.    The patient said she has bladder leakage. She wears a panty liner when she is out. The patient said if she feels like she gets any leakage in it, when she goes to the gym and sometimes when she is working out, it will be a leakage. She tries to always change that and keep a dry pad.    The following portions of the patient's history were reviewed and updated as appropriate: allergies, current medications, past social history and problem list    Review of Systems   Constitutional:  Positive for appetite change. Negative for chills, fatigue, fever and unexpected weight change.   HENT:  Positive for congestion, postnasal drip, rhinorrhea, sneezing and sore throat. Negative for ear pain, hearing loss, sinus pressure and trouble swallowing.    Eyes:  Positive for itching.   Respiratory: Negative.  Negative for cough, chest tightness and shortness of breath.    Cardiovascular: Negative.  Negative for chest pain, palpitations and leg swelling.   Gastrointestinal:  Positive for abdominal distention and abdominal pain. Negative for blood in stool, constipation, diarrhea, nausea and vomiting.   Endocrine: Negative.    Genitourinary: Negative.  Negative for difficulty urinating and dysuria.   Musculoskeletal: Negative.  Negative for back pain.   Skin: Negative.  Negative for color change and rash.   Allergic/Immunologic: Negative.  Negative for food allergies.   Neurological: Negative.  Negative for dizziness, syncope, weakness and headaches.   Hematological: Negative.    Psychiatric/Behavioral: Negative.     All other systems reviewed and are  negative.    Objective     Vitals:    06/06/23 0943   BP: 118/80   Pulse: 79   Resp: 12   Temp: 98 °F (36.7 °C)   SpO2: 98%       Physical Exam  Vitals and nursing note reviewed.   Constitutional:       General: She is not in acute distress.     Appearance: Normal appearance. She is well-developed. She is not ill-appearing, toxic-appearing or diaphoretic.   HENT:      Head: Normocephalic and atraumatic.      Right Ear: External ear normal.      Left Ear: External ear normal.      Nose: Nose normal.   Eyes:      Conjunctiva/sclera: Conjunctivae normal.      Pupils: Pupils are equal, round, and reactive to light.   Neck:      Thyroid: No thyromegaly.      Vascular: No carotid bruit or JVD.   Cardiovascular:      Rate and Rhythm: Normal rate and regular rhythm.      Pulses: Normal pulses.      Heart sounds: Normal heart sounds. No murmur heard.  Pulmonary:      Effort: Pulmonary effort is normal. No respiratory distress.      Breath sounds: Normal breath sounds.   Abdominal:      General: Bowel sounds are normal.      Palpations: Abdomen is soft. There is no mass.      Tenderness: There is no abdominal tenderness.   Musculoskeletal:         General: No swelling. Normal range of motion.      Cervical back: Normal range of motion and neck supple.   Lymphadenopathy:      Cervical: No cervical adenopathy.   Skin:     General: Skin is warm and dry.      Findings: No lesion or rash.   Neurological:      Mental Status: She is alert and oriented to person, place, and time.      Cranial Nerves: No cranial nerve deficit.      Sensory: No sensory deficit.      Motor: No weakness.      Coordination: Coordination normal.      Gait: Gait normal.      Deep Tendon Reflexes: Reflexes are normal and symmetric.   Psychiatric:         Mood and Affect: Mood normal.         Behavior: Behavior normal.         Thought Content: Thought content normal.         Judgment: Judgment normal.       Assessment & Plan     Problems Addressed this Visit     None  Visit Diagnoses       Annual physical exam    -  Primary    Relevant Orders    CBC (No Diff)    Comprehensive Metabolic Panel    Lipid Panel    TSH    T4, Free    Medicare annual wellness visit, subsequent        Primary hypertension        Relevant Orders    Comprehensive Metabolic Panel    TSH    T4, Free    Seasonal allergic rhinitis, unspecified trigger        Relevant Medications    azelastine (ASTELIN) 0.1 % nasal spray    Other Relevant Orders    CBC (No Diff)    Comprehensive Metabolic Panel    Rheumatoid arthritis with positive rheumatoid factor, involving unspecified site        Diverticulosis of colon        Menopause              Diagnoses         Codes Comments    Annual physical exam    -  Primary ICD-10-CM: Z00.00  ICD-9-CM: V70.0     Medicare annual wellness visit, subsequent     ICD-10-CM: Z00.00  ICD-9-CM: V70.0     Primary hypertension     ICD-10-CM: I10  ICD-9-CM: 401.9     Seasonal allergic rhinitis, unspecified trigger     ICD-10-CM: J30.2  ICD-9-CM: 477.9     Rheumatoid arthritis with positive rheumatoid factor, involving unspecified site     ICD-10-CM: M05.9  ICD-9-CM: 714.0     Diverticulosis of colon     ICD-10-CM: K57.30  ICD-9-CM: 562.10     Menopause     ICD-10-CM: Z78.0  ICD-9-CM: 627.2           Preventive medicine discussed, diet, exercise, healthy living discussed at length.  Discussed nutrition, physical activity, healthy weight, injury prevention, misuse of tobacco, alcohol and drugs, dental health, mental health, immunizations, screening    Part of this note may be an electronic transcription/translation of spoken language to printed text using the Dragon Dictation System.            Transcribed from ambient dictation for MAUDE Esquivel MD by Shellie Mckeon.  06/06/23   12:53 EDT  Patient or patient representative verbalized consent to the visit recording.  I have personally performed the services described in this document as transcribed by the above individual,  and it is both accurate and complete.

## 2023-06-07 LAB
ALBUMIN SERPL-MCNC: 4.7 G/DL (ref 3.5–5.2)
ALBUMIN/GLOB SERPL: 1.7 G/DL
ALP SERPL-CCNC: 68 U/L (ref 39–117)
ALT SERPL W P-5'-P-CCNC: 15 U/L (ref 1–33)
ANION GAP SERPL CALCULATED.3IONS-SCNC: 14.5 MMOL/L (ref 5–15)
AST SERPL-CCNC: 23 U/L (ref 1–32)
BILIRUB SERPL-MCNC: 0.7 MG/DL (ref 0–1.2)
BUN SERPL-MCNC: 15 MG/DL (ref 8–23)
BUN/CREAT SERPL: 20.5 (ref 7–25)
CALCIUM SPEC-SCNC: 9.5 MG/DL (ref 8.6–10.5)
CHLORIDE SERPL-SCNC: 98 MMOL/L (ref 98–107)
CO2 SERPL-SCNC: 23.5 MMOL/L (ref 22–29)
CREAT SERPL-MCNC: 0.73 MG/DL (ref 0.57–1)
EGFRCR SERPLBLD CKD-EPI 2021: 85.4 ML/MIN/1.73
GLOBULIN UR ELPH-MCNC: 2.7 GM/DL
GLUCOSE SERPL-MCNC: 94 MG/DL (ref 65–99)
POTASSIUM SERPL-SCNC: 4.2 MMOL/L (ref 3.5–5.2)
PROT SERPL-MCNC: 7.4 G/DL (ref 6–8.5)
SODIUM SERPL-SCNC: 136 MMOL/L (ref 136–145)

## 2023-06-07 NOTE — PROGRESS NOTES
The ABCs of the Annual Wellness Visit  Subsequent Medicare Wellness Visit    Chief Complaint   Patient presents with    Medicare Wellness-subsequent      Subjective   History of Present Illness:  Roxie Bruce is a 77 y.o. female who presents for a Subsequent Medicare Wellness Visit.    The following portions of the patient's history were reviewed and   updated as appropriate: allergies, current medications, past family history, past medical history, past social history, past surgical history, and problem list.    Compared to one year ago, the patient feels her physical   health is the same.    Compared to one year ago, the patient feels her mental   health is the same.    Recent Hospitalizations:  She was not admitted to the hospital during the last year.       Current Medical Providers:  Patient Care Team:  Justus Esquivel MD as PCP - General (Family Medicine)  Parker Germain MD as Consulting Physician (Ophthalmology)    Outpatient Medications Prior to Visit   Medication Sig Dispense Refill    Calcium Carbonate (CALTRATE 600 PO) Take 1 capsule by mouth Daily.      calcium carbonate (TUMS) 500 MG chewable tablet Chew 1 tablet Daily.      cholecalciferol (VITAMIN D3) 1000 UNITS tablet Take 1 tablet by mouth Daily.      Diclofenac Sodium (VOLTAREN EX) Apply  topically.      FOLIC ACID PO Take 1 tablet/day by mouth.      methotrexate 2.5 MG tablet 1 tablet 1 (One) Time Per Week.      Probiotic Product (PROBIOTIC ADVANCED PO) Take  by mouth.      azelastine (ASTELIN) 0.1 % nasal spray 2 sprays into the nostril(s) as directed by provider 2 (Two) Times a Day. Use in each nostril as directed 30 mL 12    estradiol (CLIMARA) 0.05 MG/24HR patch Place 1 patch on the skin as directed by provider 1 (One) Time Per Week. 12 each 4     No facility-administered medications prior to visit.       No opioid medication identified on active medication list. I have reviewed chart for other potential  high risk  "medication/s and harmful drug interactions in the elderly.        Aspirin is not on active medication list.  Aspirin use is not indicated based on review of current medical condition/s. Risk of harm outweighs potential benefits.  .    Patient Active Problem List   Diagnosis    Essential hypertension    Menopausal and female climacteric states    Chest pain    Rheumatoid arteritis     Advance Care Planning  ACP discussion was declined by the patient. Patient has an advance directive in EMR which is still valid.     Review of Systems      Objective      Vitals:    23 0943   BP: 118/80   Pulse: 79   Resp: 12   Temp: 98 °F (36.7 °C)   SpO2: 98%   Weight: 58.4 kg (128 lb 12.8 oz)   Height: 168.9 cm (66.5\")   PainSc: 0-No pain     BMI Readings from Last 1 Encounters:   23 20.48 kg/m²   BMI is within normal parameters. No follow-up required.    Does the patient have evidence of cognitive impairment? No    Physical Exam            HEALTH RISK ASSESSMENT    Smoking Status:  Social History     Tobacco Use   Smoking Status Never   Smokeless Tobacco Never     Alcohol Consumption:  Social History     Substance and Sexual Activity   Alcohol Use Yes    Types: 1 - 2 Glasses of wine per week    Comment: rarely     Fall Risk Screen:    STEADI Fall Risk Assessment was completed, and patient is at LOW risk for falls.Assessment completed on:2023    Depression Screenin/6/2023     9:56 AM   PHQ-2/PHQ-9 Depression Screening   Little Interest or Pleasure in Doing Things 0-->not at all   Feeling Down, Depressed or Hopeless 0-->not at all   PHQ-9: Brief Depression Severity Measure Score 0       Health Habits and Functional and Cognitive Screenin/27/2022    10:37 AM   Functional & Cognitive Status   Do you have difficulty preparing food and eating? No   Do you have difficulty bathing yourself, getting dressed or grooming yourself? No   Do you have difficulty using the toilet? No   Do you have difficulty moving " around from place to place? No   Do you have trouble with steps or getting out of a bed or a chair? No   Current Diet Well Balanced Diet   Dental Exam Not up to date   Eye Exam Up to date   Exercise (times per week) 0 times per week   Current Exercises Include No Regular Exercise   Do you need help using the phone?  No   Are you deaf or do you have serious difficulty hearing?  No   Do you need help with transportation? No   Do you need help shopping? No   Do you need help preparing meals?  No   Do you need help with housework?  No   Do you need help with laundry? No   Do you need help taking your medications? No   Do you need help managing money? No   Do you ever drive or ride in a car without wearing a seat belt? No   Have you felt unusual stress, anger or loneliness in the last month? No   Who do you live with? Spouse   If you need help, do you have trouble finding someone available to you? No   Have you been bothered in the last four weeks by sexual problems? No   Do you have difficulty concentrating, remembering or making decisions? No       Age-appropriate Screening Schedule:  Refer to the list below for future screening recommendations based on patient's age, sex and/or medical conditions. Orders for these recommended tests are listed in the plan section. The patient has been provided with a written plan.    Health Maintenance   Topic Date Due    COVID-19 Vaccine (6 - Moderna series) 06/08/2023 (Originally 2/17/2023)    INFLUENZA VACCINE  08/01/2023    DXA SCAN  08/25/2023    ANNUAL WELLNESS VISIT  06/06/2024    MAMMOGRAM  03/29/2025    COLORECTAL CANCER SCREENING  04/16/2026    TDAP/TD VACCINES (2 - Td or Tdap) 03/30/2027    Pneumococcal Vaccine 65+  Completed    HEPATITIS C SCREENING  Addressed    ZOSTER VACCINE  Discontinued              Assessment & Plan     CMS Preventative Services Quick Reference  Risk Factors Identified During Encounter  None Identified  The above risks/problems have been discussed  with the patient.  Follow up actions/plans if indicated are seen below in the Assessment/Plan Section.  Pertinent information has been shared with the patient in the After Visit Summary.    Diagnoses and all orders for this visit:    1. Annual physical exam (Primary)  -     CBC (No Diff)  -     Comprehensive Metabolic Panel  -     Lipid Panel  -     TSH  -     T4, Free    2. Medicare annual wellness visit, subsequent    3. Primary hypertension  -     Comprehensive Metabolic Panel  -     TSH  -     T4, Free    4. Seasonal allergic rhinitis, unspecified trigger  -     CBC (No Diff)  -     Comprehensive Metabolic Panel  -     azelastine (ASTELIN) 0.1 % nasal spray; 2 sprays into the nostril(s) as directed by provider 2 (Two) Times a Day. Use in each nostril as directed  Dispense: 30 mL; Refill: 12    5. Rheumatoid arthritis with positive rheumatoid factor, involving unspecified site    6. Diverticulosis of colon    7. Menopause    Other orders  -     estradiol (CLIMARA) 0.05 MG/24HR patch; Place 1 patch on the skin as directed by provider 1 (One) Time Per Week.  Dispense: 12 each; Refill: 4        Follow Up:  Return in about 1 year (around 6/6/2024) for Annual, Recheck, Medicare Wellness.     An After Visit Summary and PPPS were given to the patient.

## 2023-09-29 ENCOUNTER — LAB (OUTPATIENT)
Dept: LAB | Facility: HOSPITAL | Age: 78
End: 2023-09-29
Payer: MEDICARE

## 2023-09-29 DIAGNOSIS — D84.821 DRUG-INDUCED IMMUNODEFICIENCY: Primary | ICD-10-CM

## 2023-09-29 DIAGNOSIS — Z79.899 DRUG-INDUCED IMMUNODEFICIENCY: Primary | ICD-10-CM

## 2023-09-29 DIAGNOSIS — Z79.899 ENCOUNTER FOR LONG-TERM (CURRENT) USE OF OTHER MEDICATIONS: ICD-10-CM

## 2023-09-29 DIAGNOSIS — M06.9 RHEUMATOID ARTHRITIS, INVOLVING UNSPECIFIED SITE, UNSPECIFIED WHETHER RHEUMATOID FACTOR PRESENT: ICD-10-CM

## 2023-09-29 LAB
ALBUMIN SERPL-MCNC: 4.6 G/DL (ref 3.5–5.2)
ALBUMIN/GLOB SERPL: 2.2 G/DL
ALP SERPL-CCNC: 61 U/L (ref 39–117)
ALT SERPL W P-5'-P-CCNC: 15 U/L (ref 1–33)
ANION GAP SERPL CALCULATED.3IONS-SCNC: 11 MMOL/L (ref 5–15)
AST SERPL-CCNC: 22 U/L (ref 1–32)
BASOPHILS # BLD AUTO: 0.03 10*3/MM3 (ref 0–0.2)
BASOPHILS NFR BLD AUTO: 0.7 % (ref 0–1.5)
BILIRUB SERPL-MCNC: 0.6 MG/DL (ref 0–1.2)
BUN SERPL-MCNC: 12 MG/DL (ref 8–23)
BUN/CREAT SERPL: 15.6 (ref 7–25)
CALCIUM SPEC-SCNC: 9.4 MG/DL (ref 8.6–10.5)
CHLORIDE SERPL-SCNC: 99 MMOL/L (ref 98–107)
CO2 SERPL-SCNC: 27 MMOL/L (ref 22–29)
CREAT SERPL-MCNC: 0.77 MG/DL (ref 0.57–1)
CRP SERPL-MCNC: 0.91 MG/DL (ref 0–0.5)
DEPRECATED RDW RBC AUTO: 42.8 FL (ref 37–54)
EGFRCR SERPLBLD CKD-EPI 2021: 79.6 ML/MIN/1.73
EOSINOPHIL # BLD AUTO: 0.08 10*3/MM3 (ref 0–0.4)
EOSINOPHIL NFR BLD AUTO: 1.8 % (ref 0.3–6.2)
ERYTHROCYTE [DISTWIDTH] IN BLOOD BY AUTOMATED COUNT: 12.7 % (ref 12.3–15.4)
ERYTHROCYTE [SEDIMENTATION RATE] IN BLOOD: 3 MM/HR (ref 0–30)
GLOBULIN UR ELPH-MCNC: 2.1 GM/DL
GLUCOSE SERPL-MCNC: 96 MG/DL (ref 65–99)
HCT VFR BLD AUTO: 40.2 % (ref 34–46.6)
HGB BLD-MCNC: 13.4 G/DL (ref 12–15.9)
IMM GRANULOCYTES # BLD AUTO: 0.01 10*3/MM3 (ref 0–0.05)
IMM GRANULOCYTES NFR BLD AUTO: 0.2 % (ref 0–0.5)
LYMPHOCYTES # BLD AUTO: 1.12 10*3/MM3 (ref 0.7–3.1)
LYMPHOCYTES NFR BLD AUTO: 24.9 % (ref 19.6–45.3)
MCH RBC QN AUTO: 31.2 PG (ref 26.6–33)
MCHC RBC AUTO-ENTMCNC: 33.3 G/DL (ref 31.5–35.7)
MCV RBC AUTO: 93.7 FL (ref 79–97)
MONOCYTES # BLD AUTO: 0.48 10*3/MM3 (ref 0.1–0.9)
MONOCYTES NFR BLD AUTO: 10.7 % (ref 5–12)
NEUTROPHILS NFR BLD AUTO: 2.77 10*3/MM3 (ref 1.7–7)
NEUTROPHILS NFR BLD AUTO: 61.7 % (ref 42.7–76)
NRBC BLD AUTO-RTO: 0 /100 WBC (ref 0–0.2)
PLATELET # BLD AUTO: 272 10*3/MM3 (ref 140–450)
PMV BLD AUTO: 10.8 FL (ref 6–12)
POTASSIUM SERPL-SCNC: 4.3 MMOL/L (ref 3.5–5.2)
PROT SERPL-MCNC: 6.7 G/DL (ref 6–8.5)
RBC # BLD AUTO: 4.29 10*6/MM3 (ref 3.77–5.28)
SODIUM SERPL-SCNC: 137 MMOL/L (ref 136–145)
WBC NRBC COR # BLD: 4.49 10*3/MM3 (ref 3.4–10.8)

## 2023-09-29 PROCEDURE — 36415 COLL VENOUS BLD VENIPUNCTURE: CPT

## 2023-09-29 PROCEDURE — 80053 COMPREHEN METABOLIC PANEL: CPT

## 2023-09-29 PROCEDURE — 85025 COMPLETE CBC W/AUTO DIFF WBC: CPT

## 2023-09-29 PROCEDURE — 86140 C-REACTIVE PROTEIN: CPT

## 2023-09-29 PROCEDURE — 85652 RBC SED RATE AUTOMATED: CPT

## 2024-01-02 ENCOUNTER — LAB (OUTPATIENT)
Dept: LAB | Facility: HOSPITAL | Age: 79
End: 2024-01-02
Payer: MEDICARE

## 2024-01-02 DIAGNOSIS — D84.821 DRUG-INDUCED IMMUNODEFICIENCY: Primary | ICD-10-CM

## 2024-01-02 DIAGNOSIS — Z79.899 DRUG-INDUCED IMMUNODEFICIENCY: Primary | ICD-10-CM

## 2024-01-02 DIAGNOSIS — Z79.899 ENCOUNTER FOR LONG-TERM (CURRENT) USE OF OTHER MEDICATIONS: ICD-10-CM

## 2024-01-02 DIAGNOSIS — M06.9 RHEUMATOID ARTHRITIS, INVOLVING UNSPECIFIED SITE, UNSPECIFIED WHETHER RHEUMATOID FACTOR PRESENT: ICD-10-CM

## 2024-01-02 LAB
ALBUMIN SERPL-MCNC: 4.4 G/DL (ref 3.5–5.2)
ALBUMIN/GLOB SERPL: 1.8 G/DL
ALP SERPL-CCNC: 63 U/L (ref 39–117)
ALT SERPL W P-5'-P-CCNC: 16 U/L (ref 1–33)
ANION GAP SERPL CALCULATED.3IONS-SCNC: 10.8 MMOL/L (ref 5–15)
AST SERPL-CCNC: 19 U/L (ref 1–32)
BASOPHILS # BLD AUTO: 0.03 10*3/MM3 (ref 0–0.2)
BASOPHILS NFR BLD AUTO: 0.5 % (ref 0–1.5)
BILIRUB SERPL-MCNC: 0.7 MG/DL (ref 0–1.2)
BUN SERPL-MCNC: 17 MG/DL (ref 8–23)
BUN/CREAT SERPL: 22.4 (ref 7–25)
CALCIUM SPEC-SCNC: 9.3 MG/DL (ref 8.6–10.5)
CHLORIDE SERPL-SCNC: 100 MMOL/L (ref 98–107)
CO2 SERPL-SCNC: 26.2 MMOL/L (ref 22–29)
CREAT SERPL-MCNC: 0.76 MG/DL (ref 0.57–1)
CRP SERPL-MCNC: <0.3 MG/DL (ref 0–0.5)
DEPRECATED RDW RBC AUTO: 44.2 FL (ref 37–54)
EGFRCR SERPLBLD CKD-EPI 2021: 80.8 ML/MIN/1.73
EOSINOPHIL # BLD AUTO: 0.12 10*3/MM3 (ref 0–0.4)
EOSINOPHIL NFR BLD AUTO: 1.9 % (ref 0.3–6.2)
ERYTHROCYTE [DISTWIDTH] IN BLOOD BY AUTOMATED COUNT: 13 % (ref 12.3–15.4)
ERYTHROCYTE [SEDIMENTATION RATE] IN BLOOD: 3 MM/HR (ref 0–30)
GLOBULIN UR ELPH-MCNC: 2.4 GM/DL
GLUCOSE SERPL-MCNC: 89 MG/DL (ref 65–99)
HCT VFR BLD AUTO: 39.1 % (ref 34–46.6)
HGB BLD-MCNC: 13.5 G/DL (ref 12–15.9)
IMM GRANULOCYTES # BLD AUTO: 0.02 10*3/MM3 (ref 0–0.05)
IMM GRANULOCYTES NFR BLD AUTO: 0.3 % (ref 0–0.5)
LYMPHOCYTES # BLD AUTO: 1.28 10*3/MM3 (ref 0.7–3.1)
LYMPHOCYTES NFR BLD AUTO: 20.3 % (ref 19.6–45.3)
MCH RBC QN AUTO: 32.7 PG (ref 26.6–33)
MCHC RBC AUTO-ENTMCNC: 34.5 G/DL (ref 31.5–35.7)
MCV RBC AUTO: 94.7 FL (ref 79–97)
MONOCYTES # BLD AUTO: 0.7 10*3/MM3 (ref 0.1–0.9)
MONOCYTES NFR BLD AUTO: 11.1 % (ref 5–12)
NEUTROPHILS NFR BLD AUTO: 4.17 10*3/MM3 (ref 1.7–7)
NEUTROPHILS NFR BLD AUTO: 65.9 % (ref 42.7–76)
NRBC BLD AUTO-RTO: 0 /100 WBC (ref 0–0.2)
PLATELET # BLD AUTO: 269 10*3/MM3 (ref 140–450)
PMV BLD AUTO: 11.3 FL (ref 6–12)
POTASSIUM SERPL-SCNC: 4.2 MMOL/L (ref 3.5–5.2)
PROT SERPL-MCNC: 6.8 G/DL (ref 6–8.5)
RBC # BLD AUTO: 4.13 10*6/MM3 (ref 3.77–5.28)
SODIUM SERPL-SCNC: 137 MMOL/L (ref 136–145)
WBC NRBC COR # BLD AUTO: 6.32 10*3/MM3 (ref 3.4–10.8)

## 2024-01-02 PROCEDURE — 86140 C-REACTIVE PROTEIN: CPT

## 2024-01-02 PROCEDURE — 36415 COLL VENOUS BLD VENIPUNCTURE: CPT

## 2024-01-02 PROCEDURE — 85652 RBC SED RATE AUTOMATED: CPT

## 2024-01-02 PROCEDURE — 85025 COMPLETE CBC W/AUTO DIFF WBC: CPT

## 2024-01-02 PROCEDURE — 80053 COMPREHEN METABOLIC PANEL: CPT

## 2024-01-09 ENCOUNTER — TELEPHONE (OUTPATIENT)
Dept: FAMILY MEDICINE CLINIC | Facility: CLINIC | Age: 79
End: 2024-01-09

## 2024-01-09 RX ORDER — AZITHROMYCIN 250 MG/1
TABLET, FILM COATED ORAL
Qty: 6 TABLET | Refills: 0 | Status: SHIPPED | OUTPATIENT
Start: 2024-01-09

## 2024-01-09 RX ORDER — METHYLPREDNISOLONE 4 MG/1
TABLET ORAL
Qty: 1 EACH | Refills: 0 | Status: SHIPPED | OUTPATIENT
Start: 2024-01-09

## 2024-01-09 NOTE — TELEPHONE ENCOUNTER
Caller: Roxie Bruce    Relationship to patient: Self    Best call back number: [phone]    Date of positive COVID19 test: 01/08/2024    COVID19 symptoms: FEVER, CONGESTION, SORE THROAT    Date of initial quarantine: 01/08/2024    Additional information or concerns: PATIENT STARTED SYMPTOMS ON SUNDAY AND TESTED ON 01/08/2024 POSITIVE. PATIENT WOULD LIKE TO KNOW WHAT THE PROTOCOL IS. PLEASE ADVISE    What is the patients preferred pharmacy: [facility] [phone]

## 2024-03-08 ENCOUNTER — TRANSCRIBE ORDERS (OUTPATIENT)
Dept: ADMINISTRATIVE | Facility: HOSPITAL | Age: 79
End: 2024-03-08
Payer: MEDICARE

## 2024-03-08 DIAGNOSIS — Z12.31 VISIT FOR SCREENING MAMMOGRAM: Primary | ICD-10-CM

## 2024-04-02 ENCOUNTER — LAB (OUTPATIENT)
Dept: LAB | Facility: HOSPITAL | Age: 79
End: 2024-04-02
Payer: MEDICARE

## 2024-04-02 DIAGNOSIS — M06.9 RHEUMATOID ARTHRITIS, INVOLVING UNSPECIFIED SITE, UNSPECIFIED WHETHER RHEUMATOID FACTOR PRESENT: Primary | ICD-10-CM

## 2024-04-02 LAB
ALBUMIN SERPL-MCNC: 4.4 G/DL (ref 3.5–5.2)
ALBUMIN/GLOB SERPL: 2 G/DL
ALP SERPL-CCNC: 63 U/L (ref 39–117)
ALT SERPL W P-5'-P-CCNC: 19 U/L (ref 1–33)
ANION GAP SERPL CALCULATED.3IONS-SCNC: 11 MMOL/L (ref 5–15)
AST SERPL-CCNC: 28 U/L (ref 1–32)
BASOPHILS # BLD AUTO: 0.03 10*3/MM3 (ref 0–0.2)
BASOPHILS NFR BLD AUTO: 0.5 % (ref 0–1.5)
BILIRUB SERPL-MCNC: 0.8 MG/DL (ref 0–1.2)
BUN SERPL-MCNC: 14 MG/DL (ref 8–23)
BUN/CREAT SERPL: 17.1 (ref 7–25)
CALCIUM SPEC-SCNC: 9.3 MG/DL (ref 8.6–10.5)
CHLORIDE SERPL-SCNC: 100 MMOL/L (ref 98–107)
CO2 SERPL-SCNC: 26 MMOL/L (ref 22–29)
CREAT SERPL-MCNC: 0.82 MG/DL (ref 0.57–1)
CRP SERPL-MCNC: <0.3 MG/DL (ref 0–0.5)
DEPRECATED RDW RBC AUTO: 44.9 FL (ref 37–54)
EGFRCR SERPLBLD CKD-EPI 2021: 73.8 ML/MIN/1.73
EOSINOPHIL # BLD AUTO: 0.08 10*3/MM3 (ref 0–0.4)
EOSINOPHIL NFR BLD AUTO: 1.3 % (ref 0.3–6.2)
ERYTHROCYTE [DISTWIDTH] IN BLOOD BY AUTOMATED COUNT: 13.1 % (ref 12.3–15.4)
ERYTHROCYTE [SEDIMENTATION RATE] IN BLOOD: 2 MM/HR (ref 0–30)
GLOBULIN UR ELPH-MCNC: 2.2 GM/DL
GLUCOSE SERPL-MCNC: 91 MG/DL (ref 65–99)
HCT VFR BLD AUTO: 42.6 % (ref 34–46.6)
HGB BLD-MCNC: 14.2 G/DL (ref 12–15.9)
IMM GRANULOCYTES # BLD AUTO: 0.02 10*3/MM3 (ref 0–0.05)
IMM GRANULOCYTES NFR BLD AUTO: 0.3 % (ref 0–0.5)
LYMPHOCYTES # BLD AUTO: 1.38 10*3/MM3 (ref 0.7–3.1)
LYMPHOCYTES NFR BLD AUTO: 22.2 % (ref 19.6–45.3)
MCH RBC QN AUTO: 31.6 PG (ref 26.6–33)
MCHC RBC AUTO-ENTMCNC: 33.3 G/DL (ref 31.5–35.7)
MCV RBC AUTO: 94.9 FL (ref 79–97)
MONOCYTES # BLD AUTO: 0.68 10*3/MM3 (ref 0.1–0.9)
MONOCYTES NFR BLD AUTO: 10.9 % (ref 5–12)
NEUTROPHILS NFR BLD AUTO: 4.04 10*3/MM3 (ref 1.7–7)
NEUTROPHILS NFR BLD AUTO: 64.8 % (ref 42.7–76)
NRBC BLD AUTO-RTO: 0 /100 WBC (ref 0–0.2)
PLATELET # BLD AUTO: 290 10*3/MM3 (ref 140–450)
PMV BLD AUTO: 11.2 FL (ref 6–12)
POTASSIUM SERPL-SCNC: 4.4 MMOL/L (ref 3.5–5.2)
PROT SERPL-MCNC: 6.6 G/DL (ref 6–8.5)
RBC # BLD AUTO: 4.49 10*6/MM3 (ref 3.77–5.28)
SODIUM SERPL-SCNC: 137 MMOL/L (ref 136–145)
WBC NRBC COR # BLD AUTO: 6.23 10*3/MM3 (ref 3.4–10.8)

## 2024-04-02 PROCEDURE — 80053 COMPREHEN METABOLIC PANEL: CPT

## 2024-04-02 PROCEDURE — 85652 RBC SED RATE AUTOMATED: CPT

## 2024-04-02 PROCEDURE — 36415 COLL VENOUS BLD VENIPUNCTURE: CPT

## 2024-04-02 PROCEDURE — 86140 C-REACTIVE PROTEIN: CPT

## 2024-04-02 PROCEDURE — 85025 COMPLETE CBC W/AUTO DIFF WBC: CPT

## 2024-04-30 ENCOUNTER — HOSPITAL ENCOUNTER (OUTPATIENT)
Dept: MAMMOGRAPHY | Facility: HOSPITAL | Age: 79
Discharge: HOME | End: 2024-04-30
Admitting: FAMILY MEDICINE
Payer: MEDICARE

## 2024-04-30 DIAGNOSIS — Z12.31 VISIT FOR SCREENING MAMMOGRAM: ICD-10-CM

## 2024-04-30 PROCEDURE — 77063 BREAST TOMOSYNTHESIS BI: CPT

## 2024-04-30 PROCEDURE — 77067 SCR MAMMO BI INCL CAD: CPT

## 2024-05-01 PROCEDURE — 77067 SCR MAMMO BI INCL CAD: CPT | Performed by: RADIOLOGY

## 2024-05-01 PROCEDURE — 77063 BREAST TOMOSYNTHESIS BI: CPT | Performed by: RADIOLOGY

## 2024-05-17 PROBLEM — M05.79 RHEUMATOID ARTHRITIS WITH RHEUMATOID FACTOR OF MULTIPLE SITES WITHOUT ORGAN OR SYSTEMS INVOLVEMENT: Status: ACTIVE | Noted: 2024-05-17

## 2024-05-20 ENCOUNTER — OFFICE VISIT (OUTPATIENT)
Age: 79
End: 2024-05-20
Payer: MEDICARE

## 2024-05-20 VITALS
WEIGHT: 129.1 LBS | TEMPERATURE: 97.4 F | HEART RATE: 79 BPM | HEIGHT: 66 IN | SYSTOLIC BLOOD PRESSURE: 150 MMHG | DIASTOLIC BLOOD PRESSURE: 82 MMHG | BODY MASS INDEX: 20.75 KG/M2

## 2024-05-20 DIAGNOSIS — Z91.89 AT RISK FOR OSTEOPOROSIS: ICD-10-CM

## 2024-05-20 DIAGNOSIS — I73.00 RAYNAUD'S PHENOMENON WITHOUT GANGRENE: Chronic | ICD-10-CM

## 2024-05-20 DIAGNOSIS — M15.9 GENERALIZED OSTEOARTHRITIS: Chronic | ICD-10-CM

## 2024-05-20 DIAGNOSIS — M05.79 RHEUMATOID ARTHRITIS WITH RHEUMATOID FACTOR OF MULTIPLE SITES WITHOUT ORGAN OR SYSTEMS INVOLVEMENT: Primary | ICD-10-CM

## 2024-05-20 DIAGNOSIS — T78.3XXD ANGIOEDEMA, SUBSEQUENT ENCOUNTER: ICD-10-CM

## 2024-05-20 DIAGNOSIS — Z79.899 HIGH RISK MEDICATION USE: ICD-10-CM

## 2024-05-20 DIAGNOSIS — R76.8 ANA POSITIVE: ICD-10-CM

## 2024-05-20 DIAGNOSIS — Z86.79 PERSONAL HISTORY OF UNSPECIFIED CIRCULATORY DISEASE: ICD-10-CM

## 2024-05-20 PROBLEM — T78.3XXA ANGIOEDEMA: Status: ACTIVE | Noted: 2024-05-20

## 2024-05-20 PROCEDURE — 3077F SYST BP >= 140 MM HG: CPT | Performed by: NURSE PRACTITIONER

## 2024-05-20 PROCEDURE — 3079F DIAST BP 80-89 MM HG: CPT | Performed by: NURSE PRACTITIONER

## 2024-05-20 PROCEDURE — 99214 OFFICE O/P EST MOD 30 MIN: CPT | Performed by: NURSE PRACTITIONER

## 2024-05-20 RX ORDER — METHOTREXATE 2.5 MG/1
15 TABLET ORAL WEEKLY
Qty: 72 TABLET | Refills: 1 | Status: SHIPPED | OUTPATIENT
Start: 2024-05-20

## 2024-05-20 NOTE — ASSESSMENT & PLAN NOTE
CORBY was positive at 1: 160.    There is no evidence of lupus or connective tissue disease.  This could be related to either age or medication or rheumatoid arthritis.

## 2024-05-20 NOTE — ASSESSMENT & PLAN NOTE
+++ rheumatoid arthritis; positive CORBY 1: 60.  Episodic flares in joints which include hips, shoulders, knees since July 2018.  Started methotrexate January 15, 2019.    Low disease activity.  Much improved since starting methotrexate.  No signs of toxicity.  Methotrexate refilled.  Labs today and every 12 weeks for monitoring.  Reviewed labs in [facility] from April 2024 and labs were stable.  Standing lab order provided.  Return to clinic in 4 months.

## 2024-05-20 NOTE — PROGRESS NOTES
Office Visit       Date: 05/20/2024   Patient Name: Roxie Bruce  MRN: [id number]  YOB: 1945    Referring Physician: Justus Esquivel*     Chief Complaint:   Chief Complaint   Patient presents with    Rheumatoid Arthritis    Osteoarthritis       History of Present Illness: Roxie Bruce is a 78 y.o. female who is here today with rheumatoid arthritis.  She feels remarkably better since starting on methotrexate in January 2019.  She has had no side effects or serious infections.  Today she rates her pain 1 out of 10 and has approximately 5 minutes of morning stiffness.  She notes muscle cramping at night when she is lying in bed.  Her joints swell.  She continues to have toenail buildup.  Sometimes her right ankle swells.  She falls asleep easily but wakes up 3-4 times at night.      Subjective   Review of Systems:  Review of Systems     Past Medical History:   Past Medical History:   Diagnosis Date    Angioedema 05/20/2024    Angioedema of lips 10/2018    Arthritis ? Years ago    Osteoarthritis    Colon polyp ?    Removed during 2 colonoscopies    Diverticulosis ?    Heart murmur ?    Mitro valve prolapse    High risk medication use 05/20/2024    Hypertension     Osteoarthritis     Positive CORBY (antinuclear antibody)     Rheumatoid arthritis     Positive rheumatoid factor 40\       Past Surgical History:   Past Surgical History:   Procedure Laterality Date    BREAST BIOPSY Left     x2 20-30 years ago     COLONOSCOPY  04/20/2016    Most recent one.    EYE SURGERY      Cataract    HYSTERECTOMY      OOPHORECTOMY         Family History:   Family History   Problem Relation Age of Onset    Osteoarthritis Mother     Hyperlipidemia Mother     Other Mother         PREDIABETES    Prostate cancer Father     Atrial fibrillation Sister     Breast cancer Other         age unknown    Endometrial cancer Neg Hx     Ovarian cancer Neg Hx        Social History:   Social History      Socioeconomic History    Marital status:    Tobacco Use    Smoking status: Never    Smokeless tobacco: Never   Substance and Sexual Activity    Alcohol use: Yes     Types: 1 - 2 Glasses of wine per week     Comment: rarely    Drug use: No    Sexual activity: Yes     Partners: Male       Medications:   Current Outpatient Medications:     azelastine (ASTELIN) 0.1 % nasal spray, 2 sprays into the nostril(s) as directed by provider 2 (Two) Times a Day. Use in each nostril as directed, Disp: 30 mL, Rfl: 12    Calcium Carbonate (CALTRATE 600 PO), Take 1 capsule by mouth Daily., Disp: , Rfl:     calcium carbonate (TUMS) 500 MG chewable tablet, Chew 1 tablet Daily., Disp: , Rfl:     cholecalciferol (VITAMIN D3) 1000 UNITS tablet, Take 1 tablet by mouth Daily., Disp: , Rfl:     estradiol (CLIMARA) 0.05 MG/24HR patch, Place 1 patch on the skin as directed by provider 1 (One) Time Per Week., Disp: 12 each, Rfl: 4    FOLIC ACID PO, Take 1 tablet/day by mouth., Disp: , Rfl:     Probiotic Product (PROBIOTIC ADVANCED PO), Take  by mouth., Disp: , Rfl:     azithromycin (Zithromax Z-Kenneth) 250 MG tablet, Take 2 tablets the first day, then 1 tablet daily for 4 days., Disp: 6 tablet, Rfl: 0    Diclofenac Sodium (VOLTAREN) 1 % gel gel, Apply 4 g topically to the appropriate area as directed Take As Directed. Apply 2-4 grams by topical route 3-4 times every day to the affected area(s) PRN, Disp: 100 g, Rfl: 3    methotrexate 2.5 MG tablet, Take 6 tablets by mouth 1 (One) Time Per Week., Disp: 72 tablet, Rfl: 1    methylPREDNISolone (MEDROL) 4 MG dose pack, Take as directed on package instructions., Disp: 1 each, Rfl: 0    Allergies: No Known Allergies    I have reviewed and updated the patient's chief complaint, history of present illness, review of systems, past medical history, surgical history, family history, social history, medications and allergy list as appropriate.     Objective    Vital Signs:   Vitals:    05/20/24  "1425   BP: 150/82   BP Location: Left arm   Patient Position: Sitting   Cuff Size: Adult   Pulse: 79   Temp: 97.4 °F (36.3 °C)   Weight: 58.6 kg (129 lb 1.6 oz)   Height: 168.9 cm (66.5\")   PainSc:   1   PainLoc: Finger     Body mass index is 20.53 kg/m².   BMI is within normal parameters. No other follow-up for BMI required.       Physical Exam:  Physical Exam  Vitals reviewed.   Constitutional:       Appearance: Normal appearance.   HENT:      Head: Normocephalic and atraumatic.      Mouth/Throat:      Mouth: Mucous membranes are moist.   Eyes:      Conjunctiva/sclera: Conjunctivae normal.   Cardiovascular:      Rate and Rhythm: Normal rate and regular rhythm.      Pulses: Normal pulses.   Pulmonary:      Effort: Pulmonary effort is normal.   Musculoskeletal:         General: Normal range of motion.      Cervical back: Normal range of motion and neck supple.      Comments: Squaring of the CMC joints bilaterally.   Skin:     General: Skin is warm and dry.   Neurological:      General: No focal deficit present.      Mental Status: She is alert and oriented to person, place, and time. Mental status is at baseline.   Psychiatric:         Mood and Affect: Mood normal.         Behavior: Behavior normal.         Thought Content: Thought content normal.         Judgment: Judgment normal.          Results Review:   Imaging Results (Last 24 Hours)       ** No results found for the last 24 hours. **            Procedures    Assessment / Plan    Assessment/Plan:   Diagnoses and all orders for this visit:    1. Rheumatoid arthritis with rheumatoid factor of multiple sites without organ or systems involvement (Primary)  Assessment & Plan:  +++ rheumatoid arthritis; positive CORBY 1: 60.  Episodic flares in joints which include hips, shoulders, knees since July 2018.  Started methotrexate January 15, 2019.    Low disease activity.  Much improved since starting methotrexate.  No signs of toxicity.  Methotrexate refilled.  Labs " today and every 12 weeks for monitoring.  Reviewed labs in [facility] from April 2024 and labs were stable.  Standing lab order provided.  Return to clinic in 4 months.    Orders:  -     methotrexate 2.5 MG tablet; Take 6 tablets by mouth 1 (One) Time Per Week.  Dispense: 72 tablet; Refill: 1  -     C-reactive Protein; Standing  -     Sedimentation Rate; Standing  -     Comprehensive Metabolic Panel; Standing  -     CBC With Manual Differential; Standing    2. Generalized osteoarthritis  Assessment & Plan:  She is stable.  Voltaren very effective for CMC joints.  Her right thumb hurts worse than the left.  She works out at the gym several days a week.  She stretches.  She walks a mile on the treadmill at 3.8 mph.      3. At risk for osteoporosis  Assessment & Plan:  DEXA was normal 8/25/2021 at [facility].  Recheck in 5 years.      4. CORBY positive  Assessment & Plan:  CORBY was positive at 1: 160.    There is no evidence of lupus or connective tissue disease.  This could be related to either age or medication or rheumatoid arthritis.      5. Angioedema, subsequent encounter  Assessment & Plan:  October 2018 lip/feb 2019;  dermatology dr fofana  ? acei induced    Episode October 2019.  Resolved without recurrence.  Question if related to prior ACE inhibitor use.  Recommend that she see dermatology or allergy if this happens again.  Avoid any further ACE inhibitor's.      6. High risk medication use  Assessment & Plan:  Methotrexate.    Risk include but are not limited to severe liver damage that can be fatal, the possible need for liver biopsy, bone marrow suppression that can lead to dangerously low blood counts, GI side effects which include mouth sores and diarrhea, fatigue, and rare risk of severe pulmonary complications.  There should be no alcohol consumed with methotrexate.  Methotrexate can cause severe fetal abnormalities whether the mother or father is taking the medication and this must be avoided if pregnancy is a  possibility.  All medications to be taken 1 day a week only.  There is need for 8 to 12-week labs and the need for folic acid  supplementation were discussed    Orders:  -     methotrexate 2.5 MG tablet; Take 6 tablets by mouth 1 (One) Time Per Week.  Dispense: 72 tablet; Refill: 1  -     C-reactive Protein; Standing  -     Sedimentation Rate; Standing  -     Comprehensive Metabolic Panel; Standing  -     CBC With Manual Differential; Standing    7. Raynaud's phenomenon without gangrene  Assessment & Plan:  Longstanding history of Raynaud's.  She has had no digital ulcerations.  Avoid cold exposure.    Encouraged to keep extremities warm.  Recommend that she avoid nicotine and caffeine as these can make the Raynaud's symptoms worse.  No evidence of a secondary connective tissue disease.      8. Personal history of unspecified circulatory disease  Assessment & Plan:  October 2018 lip/feb 2019;  dermatology dr fofana  ? acei induced    Episode October 2019.  Resolved without recurrence.  Question if related to prior ACE inhibitor use.  Recommend that she see dermatology or allergy if this happens again.  Avoid any further ACE inhibitor's.      Other orders  -     Diclofenac Sodium (VOLTAREN) 1 % gel gel; Apply 4 g topically to the appropriate area as directed Take As Directed. Apply 2-4 grams by topical route 3-4 times every day to the affected area(s) PRN  Dispense: 100 g; Refill: 3        Follow Up:   Return in about 4 months (around 9/20/2024) for Dr. Carrasco, BROOKS [place].        BROOKS Johnson  [facility]

## 2024-05-20 NOTE — ASSESSMENT & PLAN NOTE
October 2018 lip/feb 2019;  dermatology dr fofana  ? acei induced    Episode October 2019.  Resolved without recurrence.  Question if related to prior ACE inhibitor use.  Recommend that she see dermatology or allergy if this happens again.  Avoid any further ACE inhibitor's.

## 2024-05-20 NOTE — ASSESSMENT & PLAN NOTE
Longstanding history of Raynaud's.  She has had no digital ulcerations.  Avoid cold exposure.    Encouraged to keep extremities warm.  Recommend that she avoid nicotine and caffeine as these can make the Raynaud's symptoms worse.  No evidence of a secondary connective tissue disease.

## 2024-05-20 NOTE — ASSESSMENT & PLAN NOTE
Methotrexate.    Risk include but are not limited to severe liver damage that can be fatal, the possible need for liver biopsy, bone marrow suppression that can lead to dangerously low blood counts, GI side effects which include mouth sores and diarrhea, fatigue, and rare risk of severe pulmonary complications.  There should be no alcohol consumed with methotrexate.  Methotrexate can cause severe fetal abnormalities whether the mother or father is taking the medication and this must be avoided if pregnancy is a possibility.  All medications to be taken 1 day a week only.  There is need for 8 to 12-week labs and the need for folic acid  supplementation were discussed

## 2024-05-20 NOTE — ASSESSMENT & PLAN NOTE
She is stable.  Voltaren very effective for CMC joints.  Her right thumb hurts worse than the left.  She works out at the gym several days a week.  She stretches.  She walks a mile on the treadmill at 3.8 mph.

## 2024-05-23 PROBLEM — Z86.79 PERSONAL HISTORY OF UNSPECIFIED CIRCULATORY DISEASE: Status: ACTIVE | Noted: 2024-05-23

## 2024-05-23 PROBLEM — T78.3XXA ANGIOEDEMA: Status: RESOLVED | Noted: 2024-05-20 | Resolved: 2024-05-23

## 2024-06-07 ENCOUNTER — OFFICE VISIT (OUTPATIENT)
Dept: FAMILY MEDICINE CLINIC | Facility: CLINIC | Age: 79
End: 2024-06-07
Payer: MEDICARE

## 2024-06-07 ENCOUNTER — OFFICE VISIT (OUTPATIENT)
Dept: CARDIOLOGY | Facility: CLINIC | Age: 79
End: 2024-06-07
Payer: MEDICARE

## 2024-06-07 ENCOUNTER — LAB (OUTPATIENT)
Dept: LAB | Facility: HOSPITAL | Age: 79
End: 2024-06-07
Payer: MEDICARE

## 2024-06-07 VITALS
TEMPERATURE: 98 F | HEIGHT: 67 IN | RESPIRATION RATE: 12 BRPM | WEIGHT: 128 LBS | SYSTOLIC BLOOD PRESSURE: 142 MMHG | HEART RATE: 75 BPM | DIASTOLIC BLOOD PRESSURE: 86 MMHG | OXYGEN SATURATION: 100 % | BODY MASS INDEX: 20.09 KG/M2

## 2024-06-07 VITALS
HEIGHT: 66 IN | WEIGHT: 129 LBS | HEART RATE: 90 BPM | BODY MASS INDEX: 20.73 KG/M2 | OXYGEN SATURATION: 97 % | DIASTOLIC BLOOD PRESSURE: 72 MMHG | SYSTOLIC BLOOD PRESSURE: 148 MMHG

## 2024-06-07 DIAGNOSIS — M05.79 RHEUMATOID ARTHRITIS WITH RHEUMATOID FACTOR OF MULTIPLE SITES WITHOUT ORGAN OR SYSTEMS INVOLVEMENT: ICD-10-CM

## 2024-06-07 DIAGNOSIS — Z00.00 MEDICARE ANNUAL WELLNESS VISIT, SUBSEQUENT: ICD-10-CM

## 2024-06-07 DIAGNOSIS — J30.2 SEASONAL ALLERGIC RHINITIS, UNSPECIFIED TRIGGER: ICD-10-CM

## 2024-06-07 DIAGNOSIS — Z51.81 MEDICATION MONITORING ENCOUNTER: ICD-10-CM

## 2024-06-07 DIAGNOSIS — I10 ESSENTIAL HYPERTENSION: ICD-10-CM

## 2024-06-07 DIAGNOSIS — Z91.89 AT RISK FOR OSTEOPOROSIS: ICD-10-CM

## 2024-06-07 DIAGNOSIS — Z00.00 ANNUAL PHYSICAL EXAM: ICD-10-CM

## 2024-06-07 DIAGNOSIS — M15.9 GENERALIZED OSTEOARTHRITIS: Chronic | ICD-10-CM

## 2024-06-07 DIAGNOSIS — I48.91 ATRIAL FIBRILLATION, UNSPECIFIED TYPE: ICD-10-CM

## 2024-06-07 DIAGNOSIS — Z00.00 ANNUAL PHYSICAL EXAM: Primary | ICD-10-CM

## 2024-06-07 DIAGNOSIS — I48.11 LONGSTANDING PERSISTENT ATRIAL FIBRILLATION: Primary | ICD-10-CM

## 2024-06-07 DIAGNOSIS — N95.1 MENOPAUSAL SYNDROME: ICD-10-CM

## 2024-06-07 LAB
ALBUMIN SERPL-MCNC: 4.6 G/DL (ref 3.5–5.2)
ALBUMIN/GLOB SERPL: 2 G/DL
ALP SERPL-CCNC: 73 U/L (ref 39–117)
ALT SERPL W P-5'-P-CCNC: 24 U/L (ref 1–33)
ANION GAP SERPL CALCULATED.3IONS-SCNC: 10 MMOL/L (ref 5–15)
AST SERPL-CCNC: 24 U/L (ref 1–32)
BILIRUB SERPL-MCNC: 1 MG/DL (ref 0–1.2)
BUN SERPL-MCNC: 14 MG/DL (ref 8–23)
BUN/CREAT SERPL: 22.2 (ref 7–25)
CALCIUM SPEC-SCNC: 9.2 MG/DL (ref 8.6–10.5)
CHLORIDE SERPL-SCNC: 100 MMOL/L (ref 98–107)
CHOLEST SERPL-MCNC: 184 MG/DL (ref 0–200)
CO2 SERPL-SCNC: 27 MMOL/L (ref 22–29)
CREAT SERPL-MCNC: 0.63 MG/DL (ref 0.57–1)
DEPRECATED RDW RBC AUTO: 42.3 FL (ref 37–54)
EGFRCR SERPLBLD CKD-EPI 2021: 91.5 ML/MIN/1.73
ERYTHROCYTE [DISTWIDTH] IN BLOOD BY AUTOMATED COUNT: 12.8 % (ref 12.3–15.4)
GLOBULIN UR ELPH-MCNC: 2.3 GM/DL
GLUCOSE SERPL-MCNC: 107 MG/DL (ref 65–99)
HCT VFR BLD AUTO: 43.2 % (ref 34–46.6)
HDLC SERPL-MCNC: 105 MG/DL (ref 40–60)
HGB BLD-MCNC: 14.3 G/DL (ref 12–15.9)
LDLC SERPL CALC-MCNC: 69 MG/DL (ref 0–100)
LDLC/HDLC SERPL: 0.66 {RATIO}
MCH RBC QN AUTO: 30.9 PG (ref 26.6–33)
MCHC RBC AUTO-ENTMCNC: 33.1 G/DL (ref 31.5–35.7)
MCV RBC AUTO: 93.3 FL (ref 79–97)
PLATELET # BLD AUTO: 287 10*3/MM3 (ref 140–450)
PMV BLD AUTO: 11.1 FL (ref 6–12)
POTASSIUM SERPL-SCNC: 4.7 MMOL/L (ref 3.5–5.2)
PROT SERPL-MCNC: 6.9 G/DL (ref 6–8.5)
RBC # BLD AUTO: 4.63 10*6/MM3 (ref 3.77–5.28)
SODIUM SERPL-SCNC: 137 MMOL/L (ref 136–145)
T4 FREE SERPL-MCNC: 1.13 NG/DL (ref 0.92–1.68)
TRIGL SERPL-MCNC: 50 MG/DL (ref 0–150)
TSH SERPL DL<=0.05 MIU/L-ACNC: 1.63 UIU/ML (ref 0.27–4.2)
VLDLC SERPL-MCNC: 10 MG/DL (ref 5–40)
WBC NRBC COR # BLD AUTO: 7.39 10*3/MM3 (ref 3.4–10.8)

## 2024-06-07 PROCEDURE — 93000 ELECTROCARDIOGRAM COMPLETE: CPT | Performed by: FAMILY MEDICINE

## 2024-06-07 PROCEDURE — 80061 LIPID PANEL: CPT

## 2024-06-07 PROCEDURE — 99397 PER PM REEVAL EST PAT 65+ YR: CPT | Performed by: FAMILY MEDICINE

## 2024-06-07 PROCEDURE — 84439 ASSAY OF FREE THYROXINE: CPT

## 2024-06-07 PROCEDURE — 36415 COLL VENOUS BLD VENIPUNCTURE: CPT

## 2024-06-07 PROCEDURE — 85027 COMPLETE CBC AUTOMATED: CPT

## 2024-06-07 PROCEDURE — 1160F RVW MEDS BY RX/DR IN RCRD: CPT | Performed by: FAMILY MEDICINE

## 2024-06-07 PROCEDURE — 3079F DIAST BP 80-89 MM HG: CPT | Performed by: FAMILY MEDICINE

## 2024-06-07 PROCEDURE — 84443 ASSAY THYROID STIM HORMONE: CPT

## 2024-06-07 PROCEDURE — 1126F AMNT PAIN NOTED NONE PRSNT: CPT | Performed by: FAMILY MEDICINE

## 2024-06-07 PROCEDURE — 1159F MED LIST DOCD IN RCRD: CPT | Performed by: FAMILY MEDICINE

## 2024-06-07 PROCEDURE — 80053 COMPREHEN METABOLIC PANEL: CPT

## 2024-06-07 PROCEDURE — 1170F FXNL STATUS ASSESSED: CPT | Performed by: FAMILY MEDICINE

## 2024-06-07 PROCEDURE — 3077F SYST BP >= 140 MM HG: CPT | Performed by: FAMILY MEDICINE

## 2024-06-07 PROCEDURE — G0439 PPPS, SUBSEQ VISIT: HCPCS | Performed by: FAMILY MEDICINE

## 2024-06-07 RX ORDER — AZELASTINE 1 MG/ML
2 SPRAY, METERED NASAL 2 TIMES DAILY
Qty: 30 ML | Refills: 12 | Status: SHIPPED | OUTPATIENT
Start: 2024-06-07

## 2024-06-07 RX ORDER — ESTRADIOL 0.05 MG/D
1 PATCH TRANSDERMAL WEEKLY
Qty: 12 EACH | Refills: 4 | Status: SHIPPED | OUTPATIENT
Start: 2024-06-07

## 2024-06-07 NOTE — PROGRESS NOTES
Subjective   Roxie Bruce is a 78 y.o. female    Chief Complaint    Annual physical exam  Annual Medicare wellness visit subsequent  Hypertension  Rheumatoid arthritis  Osteoarthritis  Urinary incontinence  Allergic rhinitis    History of Present Illness  Patient presents today for her annual examination and annual Medicare wellness visit as well as follow-up regarding hypertension, arthritis, urinary incontinence and allergic rhinitis.  Patient reports that her sinus symptoms have drastically improved with the use of Astelin nasal spray.  She remains quite active physically and goes to the gym several times each week.  In fact she thinks she overdid it and strained or sprained something in her gluteal muscles or SI joint as she has pain on the right side with certain movements.  It seems to gradually be getting better.  Patient reports a sensation of anxiousness or apprehension that overcomes her at times for no certain reasons.  It resolves and she basically forgets about it.  She reports awakening several times each night and often goes to the bathroom but goes right back to sleep.  She feels well rested in the mornings with no excessive drowsiness.  She is due for a bone density screening test.  Her last one was 4 years ago.    The following portions of the patient's history were reviewed and updated as appropriate: allergies, current medications, past social history and problem list    Review of Systems   Constitutional: Negative.    HENT: Negative.     Eyes: Negative.    Respiratory: Negative.     Cardiovascular: Negative.    Gastrointestinal: Negative.    Endocrine: Negative.    Genitourinary:  Positive for frequency and urgency. Negative for difficulty urinating, dysuria and hematuria.   Musculoskeletal:  Positive for arthralgias. Negative for back pain, myalgias and neck pain.   Skin: Negative.    Allergic/Immunologic: Negative.    Neurological: Negative.    Hematological: Negative.     Psychiatric/Behavioral: Negative.     All other systems reviewed and are negative.    Objective     Vitals:    06/07/24 0901   BP: 142/86   Pulse: 75   Resp: 12   Temp: 98 °F (36.7 °C)   SpO2: 100%       Physical Exam  Vitals and nursing note reviewed.   Constitutional:       General: She is not in acute distress.     Appearance: Normal appearance. She is well-developed. She is not ill-appearing, toxic-appearing or diaphoretic.   HENT:      Head: Normocephalic and atraumatic.      Right Ear: External ear normal.      Left Ear: External ear normal.   Eyes:      General: No scleral icterus.     Conjunctiva/sclera: Conjunctivae normal.      Pupils: Pupils are equal, round, and reactive to light.   Neck:      Thyroid: No thyromegaly.      Vascular: No carotid bruit or JVD.   Cardiovascular:      Rate and Rhythm: Normal rate and regular rhythm. Rhythm irregular.      Pulses: Normal pulses.      Heart sounds: Normal heart sounds. No murmur heard.  Pulmonary:      Effort: Pulmonary effort is normal. No respiratory distress.      Breath sounds: Normal breath sounds.   Abdominal:      General: Bowel sounds are normal.      Palpations: Abdomen is soft. There is no mass.      Tenderness: There is no abdominal tenderness.   Musculoskeletal:         General: No swelling. Normal range of motion.      Cervical back: Normal range of motion and neck supple.   Lymphadenopathy:      Cervical: No cervical adenopathy.   Skin:     General: Skin is warm and dry.      Findings: No lesion or rash.   Neurological:      Mental Status: She is alert and oriented to person, place, and time.      Cranial Nerves: No cranial nerve deficit.      Sensory: No sensory deficit.      Motor: No weakness.      Coordination: Coordination normal.      Gait: Gait normal.      Deep Tendon Reflexes: Reflexes are normal and symmetric.   Psychiatric:         Mood and Affect: Mood normal.         Behavior: Behavior normal.         Thought Content: Thought  content normal.         Judgment: Judgment normal.       ECG 12 Lead    Date/Time: 6/7/2024 10:13 AM  Performed by: Justus Esquivel MD    Authorized by: Justus Esquivel MD  Previous ECG: no previous ECG available  Rhythm: atrial fibrillation  Ectopy: unifocal PVCs  Rate: normal  Conduction: conduction normal  QRS axis: normal  Other findings: non-specific ST-T wave changes    Clinical impression: abnormal EKG  Comments: Atrial fibrillation  PVC       Assessment & Plan   Problems Addressed this Visit          Cardiac and Vasculature    Essential hypertension    Relevant Orders    Comprehensive Metabolic Panel    TSH    T4, Free       Musculoskeletal and Injuries    Generalized osteoarthritis (Chronic)    Rheumatoid arthritis with rheumatoid factor of multiple sites without organ or systems involvement     Other Visit Diagnoses       Annual physical exam    -  Primary    Relevant Orders    CBC (No Diff)    Comprehensive Metabolic Panel    Lipid Panel    TSH    T4, Free    Medicare annual wellness visit, subsequent        Relevant Orders    CBC (No Diff)    Comprehensive Metabolic Panel    Lipid Panel    TSH    T4, Free    Medication monitoring encounter        Relevant Orders    CBC (No Diff)    Comprehensive Metabolic Panel    Seasonal allergic rhinitis, unspecified trigger        Relevant Medications    azelastine (ASTELIN) 0.1 % nasal spray    Atrial fibrillation, unspecified type        Relevant Orders    Ambulatory Referral to Cardiology          Diagnoses         Codes Comments    Annual physical exam    -  Primary ICD-10-CM: Z00.00  ICD-9-CM: V70.0     Medicare annual wellness visit, subsequent     ICD-10-CM: Z00.00  ICD-9-CM: V70.0     Essential hypertension     ICD-10-CM: I10  ICD-9-CM: 401.9     Generalized osteoarthritis     ICD-10-CM: M15.9  ICD-9-CM: 715.00     Rheumatoid arthritis with rheumatoid factor of multiple sites without organ or systems involvement     ICD-10-CM:  M05.79  ICD-9-CM: 714.0     Medication monitoring encounter     ICD-10-CM: Z51.81  ICD-9-CM: V58.83     Seasonal allergic rhinitis, unspecified trigger     ICD-10-CM: J30.2  ICD-9-CM: 477.9     Atrial fibrillation, unspecified type     ICD-10-CM: I48.91  ICD-9-CM: 427.31           Preventive medicine discussed, diet, exercise, healthy living discussed at length.  Discussed nutrition, physical activity, healthy weight, injury prevention, misuse of tobacco, alcohol and drugs, dental health, mental health, immunizations, screening    Part of this note may be an electronic transcription/translation of spoken language to printed text using the Dragon Dictation System.

## 2024-06-07 NOTE — PROGRESS NOTES
The ABCs of the Annual Wellness Visit  Subsequent Medicare Wellness Visit    Chief Complaint   Patient presents with    Medicare Wellness-subsequent      Subjective   History of Present Illness:  Roxie Bruce is a 78 y.o. female who presents for a Subsequent Medicare Wellness Visit.  History of Present Illness      The following portions of the patient's history were reviewed and   updated as appropriate: allergies, current medications, past family history, past medical history, past social history, past surgical history, and problem list.    Compared to one year ago, the patient feels her physical   health is the same.    Compared to one year ago, the patient feels her mental   health is the same.    Recent Hospitalizations:  She was not admitted to the hospital during the last year.       Current Medical Providers:  Patient Care Team:  Justus Esquivel MD as PCP - General (Family Medicine)  Parker Germain MD as Consulting Physician (Ophthalmology)    Outpatient Medications Prior to Visit   Medication Sig Dispense Refill    Calcium Carbonate (CALTRATE 600 PO) Take 1 capsule by mouth Daily.      calcium carbonate (TUMS) 500 MG chewable tablet Chew 1 tablet Daily.      cholecalciferol (VITAMIN D3) 1000 UNITS tablet Take 1 tablet by mouth Daily.      Diclofenac Sodium (VOLTAREN) 1 % gel gel Apply 4 g topically to the appropriate area as directed Take As Directed. Apply 2-4 grams by topical route 3-4 times every day to the affected area(s)  g 3    FOLIC ACID PO Take 1 tablet/day by mouth.      methotrexate 2.5 MG tablet Take 6 tablets by mouth 1 (One) Time Per Week. 72 tablet 1    Probiotic Product (PROBIOTIC ADVANCED PO) Take  by mouth.      azelastine (ASTELIN) 0.1 % nasal spray 2 sprays into the nostril(s) as directed by provider 2 (Two) Times a Day. Use in each nostril as directed 30 mL 12    azithromycin (Zithromax Z-Kenneth) 250 MG tablet Take 2 tablets the first day, then 1 tablet daily for  "4 days. 6 tablet 0    estradiol (CLIMARA) 0.05 MG/24HR patch Place 1 patch on the skin as directed by provider 1 (One) Time Per Week. 12 each 4    methylPREDNISolone (MEDROL) 4 MG dose pack Take as directed on package instructions. 1 each 0     No facility-administered medications prior to visit.       No opioid medication identified on active medication list. I have reviewed chart for other potential  high risk medication/s and harmful drug interactions in the elderly.        Aspirin is not on active medication list.  Aspirin use is not indicated based on review of current medical condition/s. Risk of harm outweighs potential benefits.  .    Patient Active Problem List   Diagnosis    Essential hypertension    Menopausal and female climacteric states    Chest pain    Rheumatoid arteritis    Rheumatoid arthritis with rheumatoid factor of multiple sites without organ or systems involvement    High risk medication use    Generalized osteoarthritis    At risk for osteoporosis    CORBY positive    Raynaud phenomenon    Personal history of unspecified circulatory disease     Advance Care Planning  ACP discussion was declined by the patient. Patient has an advance directive in EMR which is still valid.     Review of Systems      Objective      Vitals:    06/07/24 0901   BP: 142/86   Pulse: 75   Resp: 12   Temp: 98 °F (36.7 °C)   SpO2: 100%   Weight: 58.1 kg (128 lb)   Height: 168.9 cm (66.5\")   PainSc: 0-No pain     BMI Readings from Last 1 Encounters:   06/07/24 20.35 kg/m²   BMI is within normal parameters. No follow-up required.    Does the patient have evidence of cognitive impairment? No    Physical Exam  Physical Exam         Results             HEALTH RISK ASSESSMENT    Smoking Status:  Social History     Tobacco Use   Smoking Status Never   Smokeless Tobacco Never     Alcohol Consumption:  Social History     Substance and Sexual Activity   Alcohol Use Yes    Types: 1 - 2 Glasses of wine per week    Comment: rarely "     Fall Risk Screen:    STEADI Fall Risk Assessment was completed, and patient is at LOW risk for falls.Assessment completed on:2024    Depression Screenin/7/2024     9:15 AM   PHQ-2/PHQ-9 Depression Screening   Little Interest or Pleasure in Doing Things 0-->not at all   Feeling Down, Depressed or Hopeless 0-->not at all   PHQ-9: Brief Depression Severity Measure Score 0       Health Habits and Functional and Cognitive Screenin/7/2024     9:17 AM   Functional & Cognitive Status   Do you have difficulty preparing food and eating? No   Do you have difficulty bathing yourself, getting dressed or grooming yourself? No   Do you have difficulty using the toilet? No   Do you have difficulty moving around from place to place? No   Do you have trouble with steps or getting out of a bed or a chair? No   Current Diet Well Balanced Diet   Dental Exam Up to date   Eye Exam Up to date   Exercise (times per week) 4 times per week   Current Exercises Include Cardiovascular Workout   Do you need help using the phone?  No   Are you deaf or do you have serious difficulty hearing?  No   Do you need help to go to places out of walking distance? No   Do you need help shopping? No   Do you need help preparing meals?  No   Do you need help with housework?  No   Do you need help with laundry? No   Do you need help taking your medications? No   Do you need help managing money? No   Do you ever drive or ride in a car without wearing a seat belt? No   Have you felt unusual stress, anger or loneliness in the last month? No   Who do you live with? Spouse   If you need help, do you have trouble finding someone available to you? No   Have you been bothered in the last four weeks by sexual problems? No   Do you have difficulty concentrating, remembering or making decisions? No       Age-appropriate Screening Schedule:  Refer to the list below for future screening recommendations based on patient's age, sex and/or medical  conditions. Orders for these recommended tests are listed in the plan section. The patient has been provided with a written plan.    Health Maintenance   Topic Date Due    COVID-19 Vaccine (7 - 2023-24 season) 08/27/2024 (Originally 11/20/2023)    RSV Vaccine - Adults (1 - 1-dose 60+ series) 06/07/2025 (Originally 6/29/2006)    INFLUENZA VACCINE  08/01/2024    ANNUAL WELLNESS VISIT  06/07/2025    COLORECTAL CANCER SCREENING  04/16/2026    DXA SCAN  05/17/2026    TDAP/TD VACCINES (2 - Td or Tdap) 03/30/2027    Pneumococcal Vaccine 65+  Completed    HEPATITIS C SCREENING  Addressed    ZOSTER VACCINE  Discontinued              Assessment & Plan     CMS Preventative Services Quick Reference  Risk Factors Identified During Encounter  None Identified  The above risks/problems have been discussed with the patient.  Follow up actions/plans if indicated are seen below in the Assessment/Plan Section.  Pertinent information has been shared with the patient in the After Visit Summary.    Diagnoses and all orders for this visit:    1. Annual physical exam (Primary)  -     CBC (No Diff); Future  -     Comprehensive Metabolic Panel; Future  -     Lipid Panel; Future  -     TSH; Future  -     T4, Free; Future    2. Medicare annual wellness visit, subsequent  -     CBC (No Diff); Future  -     Comprehensive Metabolic Panel; Future  -     Lipid Panel; Future  -     TSH; Future  -     T4, Free; Future    3. Essential hypertension  -     Comprehensive Metabolic Panel; Future  -     TSH; Future  -     T4, Free; Future    4. Generalized osteoarthritis    5. Rheumatoid arthritis with rheumatoid factor of multiple sites without organ or systems involvement    6. Medication monitoring encounter  -     CBC (No Diff); Future  -     Comprehensive Metabolic Panel; Future    7. Seasonal allergic rhinitis, unspecified trigger  -     azelastine (ASTELIN) 0.1 % nasal spray; 2 sprays into the nostril(s) as directed by provider 2 (Two) Times a Day.  Use in each nostril as directed  Dispense: 30 mL; Refill: 12    8. Atrial fibrillation, unspecified type  -     Ambulatory Referral to Cardiology  -     ECG 12 Lead    9. Menopausal and female climacteric states  -     estradiol (CLIMARA) 0.05 MG/24HR patch; Place 1 patch on the skin as directed by provider 1 (One) Time Per Week.  Dispense: 12 each; Refill: 4  -     DEXA Bone Density Axial; Future    10. At risk for osteoporosis  -     DEXA Bone Density Axial; Future      Assessment & Plan      Follow Up:  Return in about 1 year (around 6/7/2025) for Medicare Wellness, Annual.     An After Visit Summary and PPPS were given to the patient.

## 2024-06-07 NOTE — PROGRESS NOTES
[facility]  INITIAL OFFICE CONSULT      Roxie Bruce  1945  PCP: Justus Esquivel MD    SUBJECTIVE:   Roxie Bruce is a 78 y.o. 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  The patient denies any previous cardiac history.  She was told a long time ago she had mitral prolapse.  Otherwise she has no other other cardiac history as far she knows.  She is quite active person.  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  She has had no difficulty with chest pain chest tightness suggesting angina pectoris.  She denies any heart failure symptoms.  She denies any orthopnea PND or peripheral edema.  She has not noticed any tach palpitation.  She has not noticed her heart racing when she exercises.  Today she had a routine visit with her primary care provider Dr. Kirkland and EKG confirmed that she was in A-fib.  She referred to our office for further Cardiac evaluation.      Cardiac PMH: (Old records have been reviewed and summarized below)  Afib  New onset of unknown duration  Chadvasc=4. Eliquis 5mg bID  HTN: Intolerance to Zestril due to Angioedema.   RA/OA   HLD      Past Medical History, Past Surgical History, Family history, Social History, and Medications were all reviewed with the patient today and updated as necessary.     Current Outpatient Medications   Medication Sig Dispense Refill    apixaban (ELIQUIS) 5 MG tablet tablet Take 1 tablet by mouth 2 (Two) Times a Day. 60 tablet 6    azelastine (ASTELIN) 0.1 % nasal spray 2  sprays into the nostril(s) as directed by provider 2 (Two) Times a Day. Use in each nostril as directed 30 mL 12    Calcium Carbonate (CALTRATE 600 PO) Take 1 capsule by mouth Daily.      calcium carbonate (TUMS) 500 MG chewable tablet Chew 1 tablet Daily.      cholecalciferol (VITAMIN D3) 1000 UNITS tablet Take 1 tablet by mouth Daily.      Diclofenac Sodium (VOLTAREN) 1 % gel gel Apply 4 g topically to the appropriate area as directed Take As Directed. Apply 2-4 grams by topical route 3-4 times every day to the affected area(s)  g 3    estradiol (CLIMARA) 0.05 MG/24HR patch Place 1 patch on the skin as directed by provider 1 (One) Time Per Week. 12 each 4    FOLIC ACID PO Take 1 tablet/day by mouth.      methotrexate 2.5 MG tablet Take 6 tablets by mouth 1 (One) Time Per Week. 72 tablet 1    metoprolol tartrate (LOPRESSOR) 25 MG tablet Take 0.5 tablets by mouth 2 (Two) Times a Day. 60 tablet 11    Probiotic Product (PROBIOTIC ADVANCED PO) Take  by mouth.       No current facility-administered medications for this visit.     No Known Allergies      Past Medical History:   Diagnosis Date    Angioedema 05/20/2024    Angioedema of lips 10/2018    Arthritis ? Years ago    Osteoarthritis    Atrial fibrillation     Colon polyp ?    Removed during 2 colonoscopies    Diverticulosis ?    Heart murmur ?    Mitro valve prolapse    High risk medication use 05/20/2024    Hypertension     Osteoarthritis     Positive CORBY (antinuclear antibody)     Rheumatoid arthritis     Positive rheumatoid factor 40\     Past Surgical History:   Procedure Laterality Date    BREAST BIOPSY Left     x2 20-30 years ago     COLONOSCOPY  04/20/2016    Most recent one.    EYE SURGERY      Cataract    HYSTERECTOMY      OOPHORECTOMY       Family History   Problem Relation Age of Onset    Osteoarthritis Mother     Hyperlipidemia Mother     Other Mother         PREDIABETES    Prostate cancer Father     Atrial fibrillation Sister     Breast cancer  "Other         age unknown    Endometrial cancer Neg Hx     Ovarian cancer Neg Hx      Social History     Tobacco Use    Smoking status: Never    Smokeless tobacco: Never   Substance Use Topics    Alcohol use: Yes     Types: 1 - 2 Glasses of wine per week     Comment: rarely       ROS:  Review of Symptoms:  General: no recent weight loss/gain, weakness or fatigue  Skin: no rashes, lumps, or other skin changes  HEENT: no dizziness, lightheadedness, or vision changes  Respiratory: no cough or hemoptysis  Cardiovascular: no palpitations, and tachycardia  Gastrointestinal: no black/tarry stools or diarrhea  Urinary: no change in frequency or urgency  Peripheral Vascular: no claudication or leg cramps  Musculoskeletal: OA, RA  Psychiatric: no depression or excessive stress  Neurological: no sensory or motor loss, no syncope  Hematologic: no anemia, easy bruising or bleeding  Endocrine: no thyroid problems, nor heat or cold intolerance         PHYSICAL EXAM:   /72 (BP Location: Right arm, Patient Position: Sitting)   Pulse 90   Ht 167.6 cm (66\")   Wt 58.5 kg (129 lb)   SpO2 97%   BMI 20.82 kg/m²      Wt Readings from Last 5 Encounters:   06/07/24 58.5 kg (129 lb)   06/07/24 58.1 kg (128 lb)   05/20/24 58.6 kg (129 lb 1.6 oz)   06/06/23 58.4 kg (128 lb 12.8 oz)   07/27/22 56.2 kg (123 lb 12.8 oz)     BP Readings from Last 5 Encounters:   06/07/24 148/72   06/07/24 142/86   05/20/24 150/82   06/06/23 118/80   07/27/22 132/82       General-Well Nourished, Well developed  Eyes - PERRLA  Neck- supple, No mass  CV- regular rate and rhythm, no MRG  Lung- clear bilaterally  Abd- soft, +BS  Musc/skel - Norm strength and range of motion  Skin- warm and dry  Neuro - Alert & Oriented x 3, appropriate mood.    Patient's external notes were reviewed.  Independent interpretation of test performed by another physician in facility were reviewed.  Outside laboratory data was also reviewed.    Medical problems and test results " were reviewed with the patient today.     Results for orders placed or performed in visit on 04/02/24   Comprehensive Metabolic Panel    Specimen: Blood   Result Value Ref Range    Glucose 91 65 - 99 mg/dL    BUN 14 8 - 23 mg/dL    Creatinine 0.82 0.57 - 1.00 mg/dL    Sodium 137 136 - 145 mmol/L    Potassium 4.4 3.5 - 5.2 mmol/L    Chloride 100 98 - 107 mmol/L    CO2 26.0 22.0 - 29.0 mmol/L    Calcium 9.3 8.6 - 10.5 mg/dL    Total Protein 6.6 6.0 - 8.5 g/dL    Albumin 4.4 3.5 - 5.2 g/dL    ALT (SGPT) 19 1 - 33 U/L    AST (SGOT) 28 1 - 32 U/L    Alkaline Phosphatase 63 39 - 117 U/L    Total Bilirubin 0.8 0.0 - 1.2 mg/dL    Globulin 2.2 gm/dL    A/G Ratio 2.0 g/dL    BUN/Creatinine Ratio 17.1 7.0 - 25.0    Anion Gap 11.0 5.0 - 15.0 mmol/L    eGFR 73.8 >60.0 mL/min/1.73   Sedimentation Rate    Specimen: Blood   Result Value Ref Range    Sed Rate 2 0 - 30 mm/hr   C-reactive Protein    Specimen: Blood   Result Value Ref Range    C-Reactive Protein <0.30 0.00 - 0.50 mg/dL   CBC Auto Differential    Specimen: Blood   Result Value Ref Range    WBC 6.23 3.40 - 10.80 10*3/mm3    RBC 4.49 3.77 - 5.28 10*6/mm3    Hemoglobin 14.2 12.0 - 15.9 g/dL    Hematocrit 42.6 34.0 - 46.6 %    MCV 94.9 79.0 - 97.0 fL    MCH 31.6 26.6 - 33.0 pg    MCHC 33.3 31.5 - 35.7 g/dL    RDW 13.1 12.3 - 15.4 %    RDW-SD 44.9 37.0 - 54.0 fl    MPV 11.2 6.0 - 12.0 fL    Platelets 290 140 - 450 10*3/mm3    Neutrophil % 64.8 42.7 - 76.0 %    Lymphocyte % 22.2 19.6 - 45.3 %    Monocyte % 10.9 5.0 - 12.0 %    Eosinophil % 1.3 0.3 - 6.2 %    Basophil % 0.5 0.0 - 1.5 %    Immature Grans % 0.3 0.0 - 0.5 %    Neutrophils, Absolute 4.04 1.70 - 7.00 10*3/mm3    Lymphocytes, Absolute 1.38 0.70 - 3.10 10*3/mm3    Monocytes, Absolute 0.68 0.10 - 0.90 10*3/mm3    Eosinophils, Absolute 0.08 0.00 - 0.40 10*3/mm3    Basophils, Absolute 0.03 0.00 - 0.20 10*3/mm3    Immature Grans, Absolute 0.02 0.00 - 0.05 10*3/mm3    nRBC 0.0 0.0 - 0.2 /100 WBC         Lab Results    Component Value Date    CHOL 226 (H) 06/06/2023     (H) 06/06/2023     (H) 06/06/2023     (H) 09/01/2022    VLDL 11 06/06/2023    VLDL 10 09/01/2022       EKG:  (EKG/Tracing has been independently visualized by me and summarized below)      ECG 12 Lead    Date/Time: 6/7/2024 2:43 PM  Performed by: Bassam Montague PA    Authorized by: Bassam Montague PA  Comparison: not compared with previous ECG   Rhythm: atrial fibrillation  Rate: tachycardic  Conduction: conduction normal  QRS axis: normal    Clinical impression: abnormal EKG          ASSESSMENT   1. New onset of afib. Unknown duration. Minimally symptomatic.     2. HTN: Elevated , allergic reaction to Zestril(Angioedema) .     3. Chadsvasc=4.       PLAN  Long discussion with patient regarding new diagnosis of Afib. She is minimally symptomatic with no prior cardiac history. She does have elevated Chadsvasc score of 4. She understands the risk and benefits of starting anticoagulation and she is agreeable to do so. She will start Eliquis 5mg Bid. She knows to avoid NSAIDs due to increased risk of bleeding. Shew ill have an Echocardiogram to rule out structural heart disease. Will start lopressor 12.5mg BID for heart rate control and to help with BP control. Long term we discussed option of ECV. We discussed the risk and benefits and she is agreeable to proceed. She have a follow up in 2 months.            Cardiology/Electrophysiology  06/07/24  14:29 EDT  Electronically signed by ROMMEL Husain, 06/07/24, 2:45 PM EDT.

## 2024-06-10 ENCOUNTER — TELEPHONE (OUTPATIENT)
Age: 79
End: 2024-06-10
Payer: MEDICARE

## 2024-06-10 NOTE — TELEPHONE ENCOUNTER
Patient stopped by today to let you know that she saw PCP Dr. Esquivel on Friday 6/7. An EKG was performed in office and she was found to have A-fib. He sent her to see cardiology the same day. She was started on Eliquis and metoprolol. She states that she was told that methotrexate and diclofenac gel should not be used with these medications. She wanted to know if she would be okay to stop taking the methotrexate and diclofenac gel. She has not started the Eliquis yet as she wanted to speak to our office first. I advised that it would be okay for her to stop methotrexate/diclofenac if that is what she was advised to do and there would be no need to taper off, however her arthritis symptoms may return. She expressed understanding. She states she is going to try stopping methotrexate and diclofenac gel and see how she does. She will get her labs when they are due in July and see how those look as well.

## 2024-06-19 ENCOUNTER — PREP FOR SURGERY (OUTPATIENT)
Dept: OTHER | Facility: HOSPITAL | Age: 79
End: 2024-06-19
Payer: MEDICARE

## 2024-06-19 DIAGNOSIS — I48.11 LONGSTANDING PERSISTENT ATRIAL FIBRILLATION: Primary | ICD-10-CM

## 2024-06-19 RX ORDER — SODIUM CHLORIDE 9 MG/ML
40 INJECTION, SOLUTION INTRAVENOUS AS NEEDED
OUTPATIENT
Start: 2024-06-19

## 2024-06-19 RX ORDER — SODIUM CHLORIDE 0.9 % (FLUSH) 0.9 %
10 SYRINGE (ML) INJECTION EVERY 12 HOURS SCHEDULED
OUTPATIENT
Start: 2024-06-19

## 2024-06-19 RX ORDER — SODIUM CHLORIDE 0.9 % (FLUSH) 0.9 %
10 SYRINGE (ML) INJECTION AS NEEDED
OUTPATIENT
Start: 2024-06-19

## 2024-06-24 ENCOUNTER — HOSPITAL ENCOUNTER (OUTPATIENT)
Dept: BONE DENSITY | Facility: HOSPITAL | Age: 79
Discharge: HOME | End: 2024-06-24
Admitting: FAMILY MEDICINE
Payer: MEDICARE

## 2024-06-24 DIAGNOSIS — N95.1 MENOPAUSAL SYNDROME: ICD-10-CM

## 2024-06-24 DIAGNOSIS — Z91.89 AT RISK FOR OSTEOPOROSIS: ICD-10-CM

## 2024-06-24 PROCEDURE — 77080 DXA BONE DENSITY AXIAL: CPT

## 2024-07-02 ENCOUNTER — LAB (OUTPATIENT)
Dept: LAB | Facility: HOSPITAL | Age: 79
End: 2024-07-02
Payer: MEDICARE

## 2024-07-02 DIAGNOSIS — Z79.899 HIGH RISK MEDICATION USE: ICD-10-CM

## 2024-07-02 DIAGNOSIS — M05.79 RHEUMATOID ARTHRITIS WITH RHEUMATOID FACTOR OF MULTIPLE SITES WITHOUT ORGAN OR SYSTEMS INVOLVEMENT: ICD-10-CM

## 2024-07-02 PROBLEM — Z87.898 HISTORY OF ANGIOEDEMA: Status: ACTIVE | Noted: 2024-07-02

## 2024-07-02 LAB
ALBUMIN SERPL-MCNC: 4.4 G/DL (ref 3.5–5.2)
ALBUMIN/GLOB SERPL: 2 G/DL
ALP SERPL-CCNC: 71 U/L (ref 39–117)
ALT SERPL W P-5'-P-CCNC: 37 U/L (ref 1–33)
ANION GAP SERPL CALCULATED.3IONS-SCNC: 10 MMOL/L (ref 5–15)
AST SERPL-CCNC: 36 U/L (ref 1–32)
BASOPHILS # BLD MANUAL: 0.07 10*3/MM3 (ref 0–0.2)
BASOPHILS NFR BLD MANUAL: 1 % (ref 0–1.5)
BILIRUB SERPL-MCNC: 0.7 MG/DL (ref 0–1.2)
BUN SERPL-MCNC: 16 MG/DL (ref 8–23)
BUN/CREAT SERPL: 23.5 (ref 7–25)
CALCIUM SPEC-SCNC: 9.3 MG/DL (ref 8.6–10.5)
CHLORIDE SERPL-SCNC: 100 MMOL/L (ref 98–107)
CO2 SERPL-SCNC: 28 MMOL/L (ref 22–29)
CREAT SERPL-MCNC: 0.68 MG/DL (ref 0.57–1)
CRP SERPL-MCNC: <0.3 MG/DL (ref 0–0.5)
DEPRECATED RDW RBC AUTO: 42 FL (ref 37–54)
EGFRCR SERPLBLD CKD-EPI 2021: 89.3 ML/MIN/1.73
EOSINOPHIL # BLD MANUAL: 0.29 10*3/MM3 (ref 0–0.4)
EOSINOPHIL NFR BLD MANUAL: 4 % (ref 0.3–6.2)
ERYTHROCYTE [DISTWIDTH] IN BLOOD BY AUTOMATED COUNT: 12.5 % (ref 12.3–15.4)
ERYTHROCYTE [SEDIMENTATION RATE] IN BLOOD: 5 MM/HR (ref 0–30)
GLOBULIN UR ELPH-MCNC: 2.2 GM/DL
GLUCOSE SERPL-MCNC: 92 MG/DL (ref 65–99)
HCT VFR BLD AUTO: 44.3 % (ref 34–46.6)
HGB BLD-MCNC: 14.7 G/DL (ref 12–15.9)
LYMPHOCYTES # BLD MANUAL: 1.21 10*3/MM3 (ref 0.7–3.1)
LYMPHOCYTES NFR BLD MANUAL: 10 % (ref 5–12)
MCH RBC QN AUTO: 31.1 PG (ref 26.6–33)
MCHC RBC AUTO-ENTMCNC: 33.2 G/DL (ref 31.5–35.7)
MCV RBC AUTO: 93.7 FL (ref 79–97)
MONOCYTES # BLD: 0.71 10*3/MM3 (ref 0.1–0.9)
NEUTROPHILS # BLD AUTO: 4.86 10*3/MM3 (ref 1.7–7)
NEUTROPHILS NFR BLD MANUAL: 68 % (ref 42.7–76)
PLAT MORPH BLD: NORMAL
PLATELET # BLD AUTO: 258 10*3/MM3 (ref 140–450)
PMV BLD AUTO: 11.4 FL (ref 6–12)
POTASSIUM SERPL-SCNC: 4.3 MMOL/L (ref 3.5–5.2)
PROT SERPL-MCNC: 6.6 G/DL (ref 6–8.5)
RBC # BLD AUTO: 4.73 10*6/MM3 (ref 3.77–5.28)
RBC MORPH BLD: NORMAL
SODIUM SERPL-SCNC: 138 MMOL/L (ref 136–145)
VARIANT LYMPHS NFR BLD MANUAL: 17 % (ref 19.6–45.3)
WBC MORPH BLD: NORMAL
WBC NRBC COR # BLD AUTO: 7.14 10*3/MM3 (ref 3.4–10.8)

## 2024-07-02 PROCEDURE — 80053 COMPREHEN METABOLIC PANEL: CPT

## 2024-07-02 PROCEDURE — 85027 COMPLETE CBC AUTOMATED: CPT

## 2024-07-02 PROCEDURE — 36415 COLL VENOUS BLD VENIPUNCTURE: CPT

## 2024-07-02 PROCEDURE — 85652 RBC SED RATE AUTOMATED: CPT

## 2024-07-02 PROCEDURE — 85007 BL SMEAR W/DIFF WBC COUNT: CPT

## 2024-07-02 PROCEDURE — 86140 C-REACTIVE PROTEIN: CPT

## 2024-07-02 NOTE — ASSESSMENT & PLAN NOTE
+++ rheumatoid arthritis; positive CORBY 1: 60.  Episodic flares in joints which include hips, shoulders, knees since July 2018.  Started methotrexate January 15, 2019.  Stopped 6/2024 due to starting Eliquis.     Low disease activity.    Trial Plaquenil 200mg po daily (dose due to weight being less than 130 pounds).  Discussed risks and benefits and side effects  Handout on Plaquenil given to patient to take home and review.  The need for yearly eye exams for plaquenil toxicity monitoring was discussed.  Patient verbalized understanding.   Labs today and every 12 weeks for monitoring.  Labs 7/2/24 and this showed mild elevation in LFT's.  Standing lab order provided.  Return to clinic I as scheduled.

## 2024-07-02 NOTE — ASSESSMENT & PLAN NOTE
October 2018 lip/feb 2019;  dermatology dr fofana  ? acei induced    One episode October 2018.  Resolved without recurrence.    ? related to her prior ACE inhibitor use.    Follow-up with dermatology or allergy if recurrence.  Avoid further ACE inhibitors

## 2024-07-02 NOTE — ASSESSMENT & PLAN NOTE
She is stable.  Voltaren very effective for CMC joints.  Her right thumb hurts worse than the left.  I think it is okay to resume voltaren gel.  She needs to avoid oral NSAIDs with Eliquis use.  She works out at the gym several days a week.  She stretches.  She walks a mile on the treadmill at 3.8 mph.

## 2024-07-03 ENCOUNTER — OFFICE VISIT (OUTPATIENT)
Age: 79
End: 2024-07-03
Payer: MEDICARE

## 2024-07-03 VITALS
SYSTOLIC BLOOD PRESSURE: 150 MMHG | HEART RATE: 75 BPM | DIASTOLIC BLOOD PRESSURE: 88 MMHG | BODY MASS INDEX: 20.72 KG/M2 | WEIGHT: 128.9 LBS | HEIGHT: 66 IN | TEMPERATURE: 97.3 F

## 2024-07-03 DIAGNOSIS — Z87.898 HISTORY OF ANGIOEDEMA: ICD-10-CM

## 2024-07-03 DIAGNOSIS — M05.79 RHEUMATOID ARTHRITIS WITH RHEUMATOID FACTOR OF MULTIPLE SITES WITHOUT ORGAN OR SYSTEMS INVOLVEMENT: Primary | ICD-10-CM

## 2024-07-03 DIAGNOSIS — M15.9 GENERALIZED OSTEOARTHRITIS: Chronic | ICD-10-CM

## 2024-07-03 DIAGNOSIS — I48.91 ATRIAL FIBRILLATION BY ELECTROCARDIOGRAM: Chronic | ICD-10-CM

## 2024-07-03 DIAGNOSIS — Z91.89 AT RISK FOR OSTEOPOROSIS: ICD-10-CM

## 2024-07-03 DIAGNOSIS — I73.00 RAYNAUD'S PHENOMENON WITHOUT GANGRENE: Chronic | ICD-10-CM

## 2024-07-03 DIAGNOSIS — R76.8 ANA POSITIVE: ICD-10-CM

## 2024-07-03 DIAGNOSIS — Z79.899 HIGH RISK MEDICATION USE: ICD-10-CM

## 2024-07-03 PROCEDURE — 1159F MED LIST DOCD IN RCRD: CPT | Performed by: NURSE PRACTITIONER

## 2024-07-03 PROCEDURE — G2211 COMPLEX E/M VISIT ADD ON: HCPCS | Performed by: NURSE PRACTITIONER

## 2024-07-03 PROCEDURE — 99214 OFFICE O/P EST MOD 30 MIN: CPT | Performed by: NURSE PRACTITIONER

## 2024-07-03 PROCEDURE — 1160F RVW MEDS BY RX/DR IN RCRD: CPT | Performed by: NURSE PRACTITIONER

## 2024-07-03 PROCEDURE — 3079F DIAST BP 80-89 MM HG: CPT | Performed by: NURSE PRACTITIONER

## 2024-07-03 PROCEDURE — 3077F SYST BP >= 140 MM HG: CPT | Performed by: NURSE PRACTITIONER

## 2024-07-03 RX ORDER — HYDROXYCHLOROQUINE SULFATE 200 MG/1
200 TABLET, FILM COATED ORAL DAILY
Qty: 30 TABLET | Refills: 5 | Status: SHIPPED | OUTPATIENT
Start: 2024-07-03

## 2024-07-03 NOTE — PROGRESS NOTES
Office Visit       Date: 07/03/2024   Patient Name: Roxie Bruce  MRN: [id number]  YOB: 1945    Referring Physician: Justus Esquivel*     Chief Complaint:   Chief Complaint   Patient presents with    Osteoarthritis    Rheumatoid Arthritis       History of Present Illness: Roxie Bruce is a 79 y.o. female who is here today follow-up of rheumatoid arthritis.  Today she reports feeling worse she rates her pain 2.5 out of 10 and has 3 minutes of morning stiffness.  She has recently been diagnosed with atrial fibrillation.  She has started on Eliquis and metoprolol.  She stopped methotrexate medication conflict.  She did not complete the review of symptoms today.  She will have a cardioversion next week with Dr. Chau.  She is following Dr. Montague.      Subjective   Review of Systems:  She did not complete the review of symptoms    Past Medical History:   Past Medical History:   Diagnosis Date    Angioedema 05/20/2024    Angioedema of lips 10/2018    Arthritis ? Years ago    Osteoarthritis    Atrial fibrillation     Colon polyp ?    Removed during 2 colonoscopies    Diverticulosis ?    Heart murmur ?    Mitro valve prolapse    High risk medication use 05/20/2024    Hypertension     Osteoarthritis     Positive CORBY (antinuclear antibody)     Rheumatoid arthritis     Positive rheumatoid factor 40\       Past Surgical History:   Past Surgical History:   Procedure Laterality Date    BREAST BIOPSY Left     x2 20-30 years ago     COLONOSCOPY  04/20/2016    Most recent one.    EYE SURGERY      Cataract    HYSTERECTOMY      OOPHORECTOMY         Family History:   Family History   Problem Relation Age of Onset    Osteoarthritis Mother     Hyperlipidemia Mother     Other Mother         PREDIABETES    Prostate cancer Father     Atrial fibrillation Sister     Breast cancer Other         age unknown    Endometrial cancer Neg Hx     Ovarian cancer Neg Hx        Social History:   Social  History     Socioeconomic History    Marital status:    Tobacco Use    Smoking status: Never    Smokeless tobacco: Never   Vaping Use    Vaping status: Never Used    Passive vaping exposure: Yes   Substance and Sexual Activity    Alcohol use: Yes     Types: 1 - 2 Glasses of wine per week     Comment: rarely    Drug use: No    Sexual activity: Yes     Partners: Male       Medications:   Current Outpatient Medications:     apixaban (ELIQUIS) 5 MG tablet tablet, Take 1 tablet by mouth 2 (Two) Times a Day., Disp: 60 tablet, Rfl: 6    azelastine (ASTELIN) 0.1 % nasal spray, 2 sprays into the nostril(s) as directed by provider 2 (Two) Times a Day. Use in each nostril as directed, Disp: 30 mL, Rfl: 12    Calcium Carbonate (CALTRATE 600 PO), Take 1 capsule by mouth Daily., Disp: , Rfl:     calcium carbonate (TUMS) 500 MG chewable tablet, Chew 1 tablet Daily., Disp: , Rfl:     cholecalciferol (VITAMIN D3) 1000 UNITS tablet, Take 1 tablet by mouth Daily., Disp: , Rfl:     estradiol (CLIMARA) 0.05 MG/24HR patch, Place 1 patch on the skin as directed by provider 1 (One) Time Per Week., Disp: 12 each, Rfl: 4    FOLIC ACID PO, Take 1 tablet/day by mouth., Disp: , Rfl:     metoprolol tartrate (LOPRESSOR) 25 MG tablet, Take 0.5 tablets by mouth 2 (Two) Times a Day., Disp: 60 tablet, Rfl: 11    Probiotic Product (PROBIOTIC ADVANCED PO), Take  by mouth., Disp: , Rfl:     Diclofenac Sodium (VOLTAREN) 1 % gel gel, Apply 4 g topically to the appropriate area as directed Take As Directed. Apply 2-4 grams by topical route 3-4 times every day to the affected area(s) PRN, Disp: 100 g, Rfl: 3    hydroxychloroquine (Plaquenil) 200 MG tablet, Take 1 tablet by mouth Daily. Take 1 tablet by oral route every day, Disp: 30 tablet, Rfl: 5    methotrexate 2.5 MG tablet, Take 6 tablets by mouth 1 (One) Time Per Week., Disp: 72 tablet, Rfl: 1    Allergies: No Known Allergies    I have reviewed and updated the patient's chief complaint, history  "of present illness, review of systems, past medical history, surgical history, family history, social history, medications and allergy list as appropriate.     Objective    Vital Signs:   Vitals:    07/03/24 0926   BP: 150/88   BP Location: Left arm   Patient Position: Sitting   Cuff Size: Adult   Pulse: 75   Temp: 97.3 °F (36.3 °C)   Weight: 58.5 kg (128 lb 14.4 oz)   Height: 167.6 cm (65.98\")   PainSc:   3   PainLoc: Leg  Comment: left     Body mass index is 20.82 kg/m².   BMI is within normal parameters. No other follow-up for BMI required.       Physical Exam:  Physical Exam  Vitals reviewed.   Constitutional:       Appearance: Normal appearance.   HENT:      Head: Normocephalic and atraumatic.      Mouth/Throat:      Mouth: Mucous membranes are moist.   Eyes:      Conjunctiva/sclera: Conjunctivae normal.   Cardiovascular:      Rate and Rhythm: Normal rate. Rhythm regularly irregular.      Pulses: Normal pulses.   Pulmonary:      Effort: Pulmonary effort is normal.      Breath sounds: Normal breath sounds.   Musculoskeletal:         General: Normal range of motion.      Cervical back: Normal range of motion and neck supple.      Comments: Squaring bilateral CMC.  Heberden's bilaterally.  Crepitus bilateral knees.   Skin:     General: Skin is warm and dry.   Neurological:      General: No focal deficit present.      Mental Status: She is alert and oriented to person, place, and time. Mental status is at baseline.   Psychiatric:         Mood and Affect: Mood normal.         Behavior: Behavior normal.         Thought Content: Thought content normal.         Judgment: Judgment normal.          Results Review:   Imaging Results (Last 24 Hours)       ** No results found for the last 24 hours. **            Procedures    Assessment / Plan    Assessment/Plan:   Diagnoses and all orders for this visit:    1. Rheumatoid arthritis with rheumatoid factor of multiple sites without organ or systems involvement " (Primary)  Assessment & Plan:  +++ rheumatoid arthritis; positive CORBY 1: 60.  Episodic flares in joints which include hips, shoulders, knees since July 2018.  Started methotrexate January 15, 2019.  Stopped 6/2024 due to starting Eliquis.     Low disease activity.    Trial Plaquenil 200mg po daily (dose due to weight being less than 130 pounds).  Discussed risks and benefits and side effects  Handout on Plaquenil given to patient to take home and review.  The need for yearly eye exams for plaquenil toxicity monitoring was discussed.  Patient verbalized understanding.   Labs today and every 12 weeks for monitoring.  Labs 7/2/24 and this showed mild elevation in LFT's.  Standing lab order provided.  Return to clinic I as scheduled.         2. High risk medication use  Assessment & Plan:  Methotrexate.     Risk include but are not limited to severe liver damage that can be fatal, the possible need for liver biopsy, bone marrow suppression that can lead to dangerously low blood counts, GI side effects which include mouth sores and diarrhea, fatigue, and rare risk of severe pulmonary complications.  There should be no alcohol consumed with methotrexate.  Methotrexate can cause severe fetal abnormalities whether the mother or father is taking the medication and this must be avoided if pregnancy is a possibility.  All medications to be taken 1 day a week only.  There is need for 8 to 12-week labs and the need for folic acid  supplementation were discussed      3. Generalized osteoarthritis  Assessment & Plan:  She is stable.  Voltaren very effective for CMC joints.  Her right thumb hurts worse than the left.  I think it is okay to resume voltaren gel.  She needs to avoid oral NSAIDs with Eliquis use.  She works out at the gym several days a week.  She stretches.  She walks a mile on the treadmill at 3.8 mph.         4. At risk for osteoporosis  Assessment & Plan:  DEXA was normal 8/25/2021 at ACL. Recheck in 5 years.        5. CORBY positive  Assessment & Plan:  CORBY was positive at 1: 160.     There is no evidence of lupus or connective tissue disease.  This could be related to either age or medication or rheumatoid arthritis.         6. Raynaud's phenomenon without gangrene  Assessment & Plan:  Longstanding history of Raynaud's.  She has had no digital ulcerations.  Avoid cold exposure.     Encouraged to keep extremities warm.  Recommend that she avoid nicotine and caffeine as these can make the Raynaud's symptoms worse.  No evidence of a secondary connective tissue disease.         7. History of angioedema  Assessment & Plan:  October 2018 lip/feb 2019;  dermatology dr fofana  ? acei induced    One episode October 2018.  Resolved without recurrence.    ? related to her prior ACE inhibitor use.    Follow-up with dermatology or allergy if recurrence.  Avoid further ACE inhibitors      8. Atrial fibrillation by electrocardiogram  Assessment & Plan:  Found June 2024 during Wellness Check.  She is asymptomatic.  She is following with Dr. Montague.  She will have a cardioversion with Dr. Chau next week.  She is now on Eliquis and metoprolol.  She has stopped methotrexate.  I think she can continue the voltaren topical gel.  Educated to avoid oral NSAIDs when taking a blood thinner.      Other orders  -     hydroxychloroquine (Plaquenil) 200 MG tablet; Take 1 tablet by mouth Daily. Take 1 tablet by oral route every day  Dispense: 30 tablet; Refill: 5        Follow Up:   Return for Next scheduled follow up.        BROOKS Johnson  [facility]

## 2024-07-03 NOTE — ASSESSMENT & PLAN NOTE
Found June 2024 during Wellness Check.  She is asymptomatic.  She is following with Dr. Montague.  She will have a cardioversion with Dr. Chau next week.  She is now on Eliquis and metoprolol.  She has stopped methotrexate.  I think she can continue the voltaren topical gel.  Educated to avoid oral NSAIDs when taking a blood thinner.

## 2024-07-09 ENCOUNTER — APPOINTMENT (OUTPATIENT)
Dept: CARDIOLOGY | Facility: HOSPITAL | Age: 79
End: 2024-07-09
Payer: MEDICARE

## 2024-07-09 ENCOUNTER — HOSPITAL ENCOUNTER (OUTPATIENT)
Dept: CARDIOLOGY | Facility: HOSPITAL | Age: 79
Discharge: HOME | End: 2024-07-09
Attending: INTERNAL MEDICINE | Admitting: INTERNAL MEDICINE
Payer: MEDICARE

## 2024-07-09 VITALS
BODY MASS INDEX: 20.73 KG/M2 | HEART RATE: 66 BPM | SYSTOLIC BLOOD PRESSURE: 137 MMHG | RESPIRATION RATE: 16 BRPM | DIASTOLIC BLOOD PRESSURE: 76 MMHG | WEIGHT: 128.97 LBS | OXYGEN SATURATION: 98 % | TEMPERATURE: 97.2 F | HEIGHT: 66 IN

## 2024-07-09 DIAGNOSIS — I48.11 LONGSTANDING PERSISTENT ATRIAL FIBRILLATION: ICD-10-CM

## 2024-07-09 LAB
BH CV ECHO MEAS - AO MAX PG: 4.2 MMHG
BH CV ECHO MEAS - AO MEAN PG: 2 MMHG
BH CV ECHO MEAS - AO ROOT DIAM: 2.9 CM
BH CV ECHO MEAS - AO V2 MAX: 102 CM/SEC
BH CV ECHO MEAS - AO V2 VTI: 21.8 CM
BH CV ECHO MEAS - AVA(I,D): 1.79 CM2
BH CV ECHO MEAS - EDV(CUBED): 97.3 ML
BH CV ECHO MEAS - EDV(MOD-SP2): 65.1 ML
BH CV ECHO MEAS - EDV(MOD-SP4): 75 ML
BH CV ECHO MEAS - EF(MOD-BP): 51.5 %
BH CV ECHO MEAS - EF(MOD-SP2): 50.1 %
BH CV ECHO MEAS - EF(MOD-SP4): 55.3 %
BH CV ECHO MEAS - ESV(CUBED): 35.9 ML
BH CV ECHO MEAS - ESV(MOD-SP2): 32.5 ML
BH CV ECHO MEAS - ESV(MOD-SP4): 33.5 ML
BH CV ECHO MEAS - FS: 28.3 %
BH CV ECHO MEAS - IVS/LVPW: 1 CM
BH CV ECHO MEAS - IVSD: 1 CM
BH CV ECHO MEAS - LA DIMENSION: 3.5 CM
BH CV ECHO MEAS - LAT PEAK E' VEL: 12.8 CM/SEC
BH CV ECHO MEAS - LV DIASTOLIC VOL/BSA (35-75): 45.3 CM2
BH CV ECHO MEAS - LV MASS(C)D: 158.8 GRAMS
BH CV ECHO MEAS - LV MAX PG: 1.48 MMHG
BH CV ECHO MEAS - LV MEAN PG: 1 MMHG
BH CV ECHO MEAS - LV SYSTOLIC VOL/BSA (12-30): 20.2 CM2
BH CV ECHO MEAS - LV V1 MAX: 60.8 CM/SEC
BH CV ECHO MEAS - LV V1 VTI: 12.4 CM
BH CV ECHO MEAS - LVIDD: 4.6 CM
BH CV ECHO MEAS - LVIDS: 3.3 CM
BH CV ECHO MEAS - LVOT AREA: 3.1 CM2
BH CV ECHO MEAS - LVOT DIAM: 2 CM
BH CV ECHO MEAS - LVPWD: 1 CM
BH CV ECHO MEAS - MED PEAK E' VEL: 9.4 CM/SEC
BH CV ECHO MEAS - MV DEC SLOPE: 262 CM/SEC2
BH CV ECHO MEAS - MV DEC TIME: 0.34 SEC
BH CV ECHO MEAS - MV E MAX VEL: 91.7 CM/SEC
BH CV ECHO MEAS - MV MAX PG: 3.6 MMHG
BH CV ECHO MEAS - MV MEAN PG: 1 MMHG
BH CV ECHO MEAS - MV P1/2T: 109.2 MSEC
BH CV ECHO MEAS - MV V2 VTI: 29.7 CM
BH CV ECHO MEAS - MVA(P1/2T): 2.01 CM2
BH CV ECHO MEAS - MVA(VTI): 1.31 CM2
BH CV ECHO MEAS - PA ACC TIME: 0.19 SEC
BH CV ECHO MEAS - RAP SYSTOLE: 3 MMHG
BH CV ECHO MEAS - RVSP: 26 MMHG
BH CV ECHO MEAS - SV(LVOT): 39 ML
BH CV ECHO MEAS - SV(MOD-SP2): 32.6 ML
BH CV ECHO MEAS - SV(MOD-SP4): 41.5 ML
BH CV ECHO MEAS - SVI(LVOT): 23.5 ML/M2
BH CV ECHO MEAS - SVI(MOD-SP2): 19.7 ML/M2
BH CV ECHO MEAS - SVI(MOD-SP4): 25.1 ML/M2
BH CV ECHO MEAS - TAPSE (>1.6): 1.87 CM
BH CV ECHO MEAS - TR MAX PG: 22.5 MMHG
BH CV ECHO MEAS - TR MAX VEL: 235.5 CM/SEC
BH CV ECHO MEASUREMENTS AVERAGE E/E' RATIO: 8.26
BH CV VAS BP RIGHT ARM: NORMAL MMHG
BH CV XLRA - RV BASE: 3.6 CM
BH CV XLRA - RV LENGTH: 7.9 CM
BH CV XLRA - RV MID: 2.5 CM
BH CV XLRA - TDI S': 9.5 CM/SEC
LEFT ATRIUM VOLUME INDEX: 30.1 ML/M2
QT INTERVAL: 424 MS
QTC INTERVAL: 444 MS

## 2024-07-09 PROCEDURE — 92960 CARDIOVERSION ELECTRIC EXT: CPT | Performed by: INTERNAL MEDICINE

## 2024-07-09 PROCEDURE — S0260 H&P FOR SURGERY: HCPCS | Performed by: INTERNAL MEDICINE

## 2024-07-09 PROCEDURE — 99152 MOD SED SAME PHYS/QHP 5/>YRS: CPT | Performed by: INTERNAL MEDICINE

## 2024-07-09 PROCEDURE — 93306 TTE W/DOPPLER COMPLETE: CPT

## 2024-07-09 PROCEDURE — 93005 ELECTROCARDIOGRAM TRACING: CPT | Performed by: INTERNAL MEDICINE

## 2024-07-09 PROCEDURE — 92960 CARDIOVERSION ELECTRIC EXT: CPT

## 2024-07-09 PROCEDURE — 93306 TTE W/DOPPLER COMPLETE: CPT | Performed by: INTERNAL MEDICINE

## 2024-07-09 PROCEDURE — 99152 MOD SED SAME PHYS/QHP 5/>YRS: CPT

## 2024-07-09 RX ORDER — SODIUM CHLORIDE 0.9 % (FLUSH) 0.9 %
10 SYRINGE (ML) INJECTION EVERY 12 HOURS SCHEDULED
Status: DISCONTINUED | OUTPATIENT
Start: 2024-07-09 | End: 2024-07-09 | Stop reason: HOSPADM

## 2024-07-09 RX ORDER — SODIUM CHLORIDE 9 MG/ML
40 INJECTION, SOLUTION INTRAVENOUS AS NEEDED
Status: DISCONTINUED | OUTPATIENT
Start: 2024-07-09 | End: 2024-07-09 | Stop reason: HOSPADM

## 2024-07-09 RX ORDER — FENTANYL CITRATE 50 UG/ML
50-100 INJECTION, SOLUTION INTRAMUSCULAR; INTRAVENOUS ONCE AS NEEDED
Status: DISCONTINUED | OUTPATIENT
Start: 2024-07-09 | End: 2024-07-09 | Stop reason: HOSPADM

## 2024-07-09 RX ORDER — FLUMAZENIL 0.1 MG/ML
0.5 INJECTION INTRAVENOUS ONCE AS NEEDED
Status: DISCONTINUED | OUTPATIENT
Start: 2024-07-09 | End: 2024-07-09 | Stop reason: HOSPADM

## 2024-07-09 RX ORDER — MIDAZOLAM HYDROCHLORIDE 1 MG/ML
2-20 INJECTION INTRAMUSCULAR; INTRAVENOUS ONCE AS NEEDED
Status: DISCONTINUED | OUTPATIENT
Start: 2024-07-09 | End: 2024-07-09 | Stop reason: HOSPADM

## 2024-07-09 RX ORDER — NALOXONE HCL 0.4 MG/ML
0.4 VIAL (ML) INJECTION ONCE AS NEEDED
Status: DISCONTINUED | OUTPATIENT
Start: 2024-07-09 | End: 2024-07-09 | Stop reason: HOSPADM

## 2024-07-09 RX ORDER — SODIUM CHLORIDE 0.9 % (FLUSH) 0.9 %
10 SYRINGE (ML) INJECTION AS NEEDED
Status: DISCONTINUED | OUTPATIENT
Start: 2024-07-09 | End: 2024-07-09 | Stop reason: HOSPADM

## 2024-07-09 RX ADMIN — METHOHEXITAL SODIUM 10 MG: 500 INJECTION, POWDER, LYOPHILIZED, FOR SOLUTION INTRAMUSCULAR; INTRAVENOUS; RECTAL at 11:49

## 2024-07-09 RX ADMIN — METHOHEXITAL SODIUM 10 MG: 500 INJECTION, POWDER, LYOPHILIZED, FOR SOLUTION INTRAMUSCULAR; INTRAVENOUS; RECTAL at 11:48

## 2024-07-09 RX ADMIN — METHOHEXITAL SODIUM 20 MG: 500 INJECTION, POWDER, LYOPHILIZED, FOR SOLUTION INTRAMUSCULAR; INTRAVENOUS; RECTAL at 11:46

## 2024-07-09 NOTE — H&P
[facility] at [facility]  HISTORY AND PHYSICAL    Roxie Bruce  : 1945  MRN:[id number]    Date of Admission:2024    PCP: Justus Esquivel MD    Chief Complaint: Atrial fibrillation    PROBLEM LIST:   Afib  New onset of unknown duration  [id number] 4 (age, sex, HTN), anticoagulated with Eliquis  HTN: Intolerance to Zestril due to Angioedema.   RA/OA   HLD    ALLERGIES: No Known Allergies    HOME MEDICINES:   Prior to Admission Medications       Prescriptions Last Dose Informant Patient Reported? Taking?    apixaban (ELIQUIS) 5 MG tablet tablet   No No    Take 1 tablet by mouth 2 (Two) Times a Day.    azelastine (ASTELIN) 0.1 % nasal spray   No No    2 sprays into the nostril(s) as directed by provider 2 (Two) Times a Day. Use in each nostril as directed    Calcium Carbonate (CALTRATE 600 PO)  Self Yes No    Take 1 capsule by mouth Daily.    calcium carbonate (TUMS) 500 MG chewable tablet   Yes No    Chew 1 tablet Daily.    cholecalciferol (VITAMIN D3) 1000 UNITS tablet  Self Yes No    Take 1 tablet by mouth Daily.    Diclofenac Sodium (VOLTAREN) 1 % gel gel   No No    Apply 4 g topically to the appropriate area as directed Take As Directed. Apply 2-4 grams by topical route 3-4 times every day to the affected area(s) PRN    estradiol (CLIMARA) 0.05 MG/24HR patch   No No    Place 1 patch on the skin as directed by provider 1 (One) Time Per Week.    FOLIC ACID PO   Yes No    Take 1 tablet/day by mouth.    hydroxychloroquine (Plaquenil) 200 MG tablet   No No    Take 1 tablet by mouth Daily. Take 1 tablet by oral route every day    metoprolol tartrate (LOPRESSOR) 25 MG tablet   No No    Take 0.5 tablets by mouth 2 (Two) Times a Day.    Probiotic Product (PROBIOTIC ADVANCED PO)   Yes No    Take  by mouth.            Subjective     HPI: Roxie Bruce is a 79 y.o. female presents to [facility] today for an external cardioversion and echocardiogram.   Patient was recently diagnosed with atrial fibrillation.  Onset is unknown to patient being minimally symptomatic.  She was started on Eliquis 6/7/2024.  Currently denies chest pain, shortness of breath, lightheadedness, palpitations, and syncope.  Denies any bleeding complications or strokelike symptoms.    ROS: All systems have been reviewed and are negative with the exception of those mentioned in the HPI and problem list above.    Past Medical History:   Past Medical History:   Diagnosis Date    Angioedema 05/20/2024    Angioedema of lips 10/2018    Arthritis ? Years ago    Osteoarthritis    Atrial fibrillation     Colon polyp ?    Removed during 2 colonoscopies    Diverticulosis ?    Heart murmur ?    Mitro valve prolapse    High risk medication use 05/20/2024    Hypertension     Osteoarthritis     Positive CORBY (antinuclear antibody)     Rheumatoid arthritis     Positive rheumatoid factor 40\      Surgical History:   Past Surgical History:   Procedure Laterality Date    BREAST BIOPSY Left     x2 20-30 years ago     COLONOSCOPY  04/20/2016    Most recent one.    EYE SURGERY      Cataract    HYSTERECTOMY      OOPHORECTOMY       Social History:   Social History     Socioeconomic History    Marital status:    Tobacco Use    Smoking status: Never    Smokeless tobacco: Never   Vaping Use    Vaping status: Never Used    Passive vaping exposure: Yes   Substance and Sexual Activity    Alcohol use: Yes     Types: 1 - 2 Glasses of wine per week     Comment: rarely    Drug use: No    Sexual activity: Yes     Partners: Male     Family History:   Family History   Problem Relation Age of Onset    Osteoarthritis Mother     Hyperlipidemia Mother     Other Mother         PREDIABETES    Prostate cancer Father     Atrial fibrillation Sister     Breast cancer Other         age unknown    Endometrial cancer Neg Hx     Ovarian cancer Neg Hx        Objective   BP (!) 181/113 (BP Location: Left arm, Patient Position: Lying)    Pulse 96   Temp 97.2 °F (36.2 °C) (Tympanic)   Resp 16   SpO2 98%   No intake or output data in the 24 hours ending 07/09/24 1028    PHYSICAL EXAM:  CONSTITUTIONAL: Well nourished, cooperative, in no acute distress  HEENT: Normocephalic, atraumatic, PERRLA, no JVD  CARDIOVASCULAR:  Regular rhythm and normal rate, no murmur, gallop, rub.   RESPIRATORY: Clear to auscultation, normal respiratory effort, no wheezing, rales or ronchi, on RA  EXTREMITIES: No gross deformities, no edema. Peripheral pulses are present and equal bilaterally  SKIN: Warm, dry. No bleeding, bruising or rash  NEUROLOGICAL: No focal deficits  PSYCHIATRIC: Normal mood and affect. Behavior is normal     EKG/Telemetry: Atrial fibrillation    Radiology Data:   No radiology results for the last day        Current Medications:  sodium chloride, 10 mL, Intravenous, Q12H             Assessment:     Atrial fibrillation  [id number] 4 (age, sex, HTN), anticoagulated with Eliquis  Onset unknown  Hypertension    Plan:     Patient is here today for an external cardioversion.  Patient has been anticoagulated with Eliquis for 30 days with no missed doses.  Procedure, risks, and alternatives have been discussed with the patient and she is agreeable to proceed.  Patient will have echocardiogram after procedure.  Further recommendations to follow.    Electronically signed by BROOKS Pierre, 07/09/24, 10:05 AM EDT.     Please note that portions of this note were dictated utilizing Dragon dictation.

## 2024-08-19 ENCOUNTER — OFFICE VISIT (OUTPATIENT)
Dept: CARDIOLOGY | Facility: CLINIC | Age: 79
End: 2024-08-19
Payer: MEDICARE

## 2024-08-19 VITALS
HEIGHT: 66 IN | WEIGHT: 128.4 LBS | DIASTOLIC BLOOD PRESSURE: 82 MMHG | BODY MASS INDEX: 20.63 KG/M2 | SYSTOLIC BLOOD PRESSURE: 140 MMHG | OXYGEN SATURATION: 100 % | HEART RATE: 87 BPM

## 2024-08-19 DIAGNOSIS — I10 ESSENTIAL HYPERTENSION: ICD-10-CM

## 2024-08-19 DIAGNOSIS — I48.91 ATRIAL FIBRILLATION BY ELECTROCARDIOGRAM: Primary | Chronic | ICD-10-CM

## 2024-08-19 PROCEDURE — 3079F DIAST BP 80-89 MM HG: CPT | Performed by: PHYSICIAN ASSISTANT

## 2024-08-19 PROCEDURE — 3077F SYST BP >= 140 MM HG: CPT | Performed by: PHYSICIAN ASSISTANT

## 2024-08-19 PROCEDURE — 93000 ELECTROCARDIOGRAM COMPLETE: CPT | Performed by: PHYSICIAN ASSISTANT

## 2024-08-19 PROCEDURE — 99214 OFFICE O/P EST MOD 30 MIN: CPT | Performed by: PHYSICIAN ASSISTANT

## 2024-08-19 NOTE — H&P (VIEW-ONLY)
[facility]   OFFICE NOTE      Roxie Bruce  1945  PCP: Justus Esquivel MD    SUBJECTIVE:   Roxie Bruce is a 79 y.o. female seen for a follow up visit regarding the following:     CCAfib. 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  She presents today for follow-up after recent cardioversion procedure.  Tolerated procedure well.  Since the cardioversion , pt denies that she feel any difference.  She states her biggest complaint is that she is on Eliquis therapy and has not been able to take this with her methotrexate.  Because of this interaction she switch methotrexate to Plaquenil but did not tolerate this and her rheumatoid arthritis is her main issue at this time.  She is concerned about bleeding if she is taking any NSAIDs or an anti-inflammatory drugs for her rheumatoid.  Otherwise from A-fib standpoint she tolerates this quite well she has no symptoms of palpitations or fatigue.  She denies any chest pain.  She denies any heart failure symptoms.        Cardiac PMH: (Old records have been reviewed and summarized below)  Afib  New onset of unknown duration  Chadvasc=4. Eliquis 5mg bID  Valvular heart disease  Echocardiogram July 2024 EF 51% aortic valve sclerosis without stenosis mild mitral annular calcification with mild mitral valve prolapse of the anterior leaflet mild MR  HTN: Intolerance to Zestril due to Angioedema.   RA/OA   HLD    Past Medical History, Past Surgical History, Family history, Social History, and Medications were all reviewed with the patient today and updated as necessary.       Current Outpatient Medications:     apixaban (ELIQUIS) 5 MG tablet tablet, Take 1 tablet by mouth 2 (Two) Times a Day., Disp: 60 tablet, Rfl: 6    azelastine (ASTELIN) 0.1 % nasal spray, 2 sprays into the nostril(s) as directed by provider 2 (Two) Times a Day. Use in each nostril as directed,  "Disp: 30 mL, Rfl: 12    calcium carbonate (TUMS) 500 MG chewable tablet, Chew 1 tablet As Needed., Disp: , Rfl:     cholecalciferol (VITAMIN D3) 1000 UNITS tablet, Take 1 tablet by mouth Daily., Disp: , Rfl:     Diclofenac Sodium (VOLTAREN) 1 % gel gel, Apply 4 g topically to the appropriate area as directed Take As Directed. Apply 2-4 grams by topical route 3-4 times every day to the affected area(s) PRN, Disp: 100 g, Rfl: 3    estradiol (CLIMARA) 0.05 MG/24HR patch, Place 1 patch on the skin as directed by provider 1 (One) Time Per Week. (Patient taking differently: Place 1 patch on the skin as directed by provider 1 (One) Time Per Week. Every WED), Disp: 12 each, Rfl: 4    FOLIC ACID PO, Take 1 tablet/day by mouth., Disp: , Rfl:     metoprolol tartrate (LOPRESSOR) 25 MG tablet, Take 0.5 tablets by mouth 2 (Two) Times a Day., Disp: 60 tablet, Rfl: 11    Probiotic Product (PROBIOTIC ADVANCED PO), Take  by mouth., Disp: , Rfl:     Calcium Carbonate (CALTRATE 600 PO), Take 1 capsule by mouth Daily. (Patient not taking: Reported on 8/19/2024), Disp: , Rfl:     hydroxychloroquine (Plaquenil) 200 MG tablet, Take 1 tablet by mouth Daily. Take 1 tablet by oral route every day (Patient not taking: Reported on 8/19/2024), Disp: 30 tablet, Rfl: 5      No Known Allergies      PHYSICAL EXAM:    /82 (BP Location: Right arm, Patient Position: Sitting, Cuff Size: Adult)   Pulse 87   Ht 167.6 cm (65.98\")   Wt 58.2 kg (128 lb 6.4 oz)   SpO2 100%   BMI 20.73 kg/m²        Wt Readings from Last 5 Encounters:   08/19/24 58.2 kg (128 lb 6.4 oz)   07/09/24 58.5 kg (128 lb 15.5 oz)   07/03/24 58.5 kg (128 lb 14.4 oz)   06/07/24 58.5 kg (129 lb)   06/07/24 58.1 kg (128 lb)       BP Readings from Last 5 Encounters:   08/19/24 140/82   07/09/24 137/76   07/03/24 150/88   06/07/24 148/72   06/07/24 142/86       General appearance - Alert, well appearing, and in no distress   Mental status - Affect appropriate to mood.  Eyes - " Sclerae anicteric,  ENMT - Hearing grossly normal bilaterally, Dental hygiene good.  Neck - Carotids upstroke normal bilaterally, no bruits, no JVD.  Resp - Clear to auscultation, no wheezes, rales or rhonchi, symmetric air entry.  Heart - IRIR, normal S1, S2, no murmurs, rubs, clicks or gallops.  GI - Soft, nontender, nondistended, no masses or organomegaly.  Neurological - Grossly intact - normal speech, no focal findings  Musculoskeletal - No joint tenderness, deformity or swelling, no muscular tenderness noted.  Extremities - Peripheral pulses normal, no pedal edema, no clubbing or cyanosis.  Skin - Normal coloration and turgor.  Psych -  oriented to person, place, and time.    Medical problems and test results were reviewed with the patient today.     No results found for this or any previous visit (from the past 672 hour(s)).        EKG: (EKG has been independently visualized by me and summarized below)  7/9/2024    ECG 12 Lead    Date/Time: 8/19/2024 1:14 PM  Performed by: Bassam Montague PA    Authorized by: Bassam Montague PA  Comparison: compared with previous ECG from 7/9/2024  Similar to previous ECG  Rhythm: atrial fibrillation  Rate: normal  Conduction: conduction normal  QRS axis: normal    Clinical impression: abnormal EKG         ASSESSMENT   1.  Persistent: ECV 6/7/2024, recurrent A-fib, patient is asymptomatic.  [id number] score 4 Eliquis therapy.     2. HTN: Controlled on low-dose metoprolol    3. VHD: Echocardiogram July 9, 2024 EF 51% aortic valve sclerosis without stenosis mild annular calcification present mild mitral valve prolapse of the anterior leaflet mild mitral valve regurgitation.    4.  Rheumatoid arthritis, patient cannot take methotrexate with Eliquis she is interested in pursuing Watchman device.        PLAN  Pleasant 79-year-old female we had a long discussion today.  She recently had an ECV June 7, 2024 she is back in atrial fibs she is unaware of this symptoms.  She  is not interested in pursuing Tikosyn or ablation procedure she states she tolerates A-fib quite well and her heart rate is controlled.  Her biggest complaint is Eliquis therapy is causing interference with her rheumatoid arthritis treatment medication she like consider Watchman device.  She does require methotrexate high risk medication for bleeding issues on Eliquis and she would like to get off of the Eliquis.  We discussed the procedure in great deal tell the risk the procedure she will consider this and think about scheduling the procedure Dr. Bernstein.            8/19/2024  16:06 EDT  Electronically signed by ROMMEL Husain, 08/19/24, 8:19 AM EDT.

## 2024-08-19 NOTE — PROGRESS NOTES
[facility]   OFFICE NOTE      Roxie Bruce  1945  PCP: Justus Esquivel MD    SUBJECTIVE:   Roxie Bruce is a 79 y.o. female seen for a follow up visit regarding the following:     CCAfib. 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  She presents today for follow-up after recent cardioversion procedure.  Tolerated procedure well.  Since the cardioversion , pt denies that she feel any difference.  She states her biggest complaint is that she is on Eliquis therapy and has not been able to take this with her methotrexate.  Because of this interaction she switch methotrexate to Plaquenil but did not tolerate this and her rheumatoid arthritis is her main issue at this time.  She is concerned about bleeding if she is taking any NSAIDs or an anti-inflammatory drugs for her rheumatoid.  Otherwise from A-fib standpoint she tolerates this quite well she has no symptoms of palpitations or fatigue.  She denies any chest pain.  She denies any heart failure symptoms.        Cardiac PMH: (Old records have been reviewed and summarized below)  Afib  New onset of unknown duration  Chadvasc=4. Eliquis 5mg bID  Valvular heart disease  Echocardiogram July 2024 EF 51% aortic valve sclerosis without stenosis mild mitral annular calcification with mild mitral valve prolapse of the anterior leaflet mild MR  HTN: Intolerance to Zestril due to Angioedema.   RA/OA   HLD    Past Medical History, Past Surgical History, Family history, Social History, and Medications were all reviewed with the patient today and updated as necessary.       Current Outpatient Medications:     apixaban (ELIQUIS) 5 MG tablet tablet, Take 1 tablet by mouth 2 (Two) Times a Day., Disp: 60 tablet, Rfl: 6    azelastine (ASTELIN) 0.1 % nasal spray, 2 sprays into the nostril(s) as directed by provider 2 (Two) Times a Day. Use in each nostril as directed,  "Disp: 30 mL, Rfl: 12    calcium carbonate (TUMS) 500 MG chewable tablet, Chew 1 tablet As Needed., Disp: , Rfl:     cholecalciferol (VITAMIN D3) 1000 UNITS tablet, Take 1 tablet by mouth Daily., Disp: , Rfl:     Diclofenac Sodium (VOLTAREN) 1 % gel gel, Apply 4 g topically to the appropriate area as directed Take As Directed. Apply 2-4 grams by topical route 3-4 times every day to the affected area(s) PRN, Disp: 100 g, Rfl: 3    estradiol (CLIMARA) 0.05 MG/24HR patch, Place 1 patch on the skin as directed by provider 1 (One) Time Per Week. (Patient taking differently: Place 1 patch on the skin as directed by provider 1 (One) Time Per Week. Every WED), Disp: 12 each, Rfl: 4    FOLIC ACID PO, Take 1 tablet/day by mouth., Disp: , Rfl:     metoprolol tartrate (LOPRESSOR) 25 MG tablet, Take 0.5 tablets by mouth 2 (Two) Times a Day., Disp: 60 tablet, Rfl: 11    Probiotic Product (PROBIOTIC ADVANCED PO), Take  by mouth., Disp: , Rfl:     Calcium Carbonate (CALTRATE 600 PO), Take 1 capsule by mouth Daily. (Patient not taking: Reported on 8/19/2024), Disp: , Rfl:     hydroxychloroquine (Plaquenil) 200 MG tablet, Take 1 tablet by mouth Daily. Take 1 tablet by oral route every day (Patient not taking: Reported on 8/19/2024), Disp: 30 tablet, Rfl: 5      No Known Allergies      PHYSICAL EXAM:    /82 (BP Location: Right arm, Patient Position: Sitting, Cuff Size: Adult)   Pulse 87   Ht 167.6 cm (65.98\")   Wt 58.2 kg (128 lb 6.4 oz)   SpO2 100%   BMI 20.73 kg/m²        Wt Readings from Last 5 Encounters:   08/19/24 58.2 kg (128 lb 6.4 oz)   07/09/24 58.5 kg (128 lb 15.5 oz)   07/03/24 58.5 kg (128 lb 14.4 oz)   06/07/24 58.5 kg (129 lb)   06/07/24 58.1 kg (128 lb)       BP Readings from Last 5 Encounters:   08/19/24 140/82   07/09/24 137/76   07/03/24 150/88   06/07/24 148/72   06/07/24 142/86       General appearance - Alert, well appearing, and in no distress   Mental status - Affect appropriate to mood.  Eyes - " Sclerae anicteric,  ENMT - Hearing grossly normal bilaterally, Dental hygiene good.  Neck - Carotids upstroke normal bilaterally, no bruits, no JVD.  Resp - Clear to auscultation, no wheezes, rales or rhonchi, symmetric air entry.  Heart - IRIR, normal S1, S2, no murmurs, rubs, clicks or gallops.  GI - Soft, nontender, nondistended, no masses or organomegaly.  Neurological - Grossly intact - normal speech, no focal findings  Musculoskeletal - No joint tenderness, deformity or swelling, no muscular tenderness noted.  Extremities - Peripheral pulses normal, no pedal edema, no clubbing or cyanosis.  Skin - Normal coloration and turgor.  Psych -  oriented to person, place, and time.    Medical problems and test results were reviewed with the patient today.     No results found for this or any previous visit (from the past 672 hour(s)).        EKG: (EKG has been independently visualized by me and summarized below)  7/9/2024    ECG 12 Lead    Date/Time: 8/19/2024 1:14 PM  Performed by: Bassam Montague PA    Authorized by: Bassam Montague PA  Comparison: compared with previous ECG from 7/9/2024  Similar to previous ECG  Rhythm: atrial fibrillation  Rate: normal  Conduction: conduction normal  QRS axis: normal    Clinical impression: abnormal EKG         ASSESSMENT   1.  Persistent: ECV 6/7/2024, recurrent A-fib, patient is asymptomatic.  [id number] score 4 Eliquis therapy.     2. HTN: Controlled on low-dose metoprolol    3. VHD: Echocardiogram July 9, 2024 EF 51% aortic valve sclerosis without stenosis mild annular calcification present mild mitral valve prolapse of the anterior leaflet mild mitral valve regurgitation.    4.  Rheumatoid arthritis, patient cannot take methotrexate with Eliquis she is interested in pursuing Watchman device.        PLAN  Pleasant 79-year-old female we had a long discussion today.  She recently had an ECV June 7, 2024 she is back in atrial fibs she is unaware of this symptoms.  She  is not interested in pursuing Tikosyn or ablation procedure she states she tolerates A-fib quite well and her heart rate is controlled.  Her biggest complaint is Eliquis therapy is causing interference with her rheumatoid arthritis treatment medication she like consider Watchman device.  She does require methotrexate high risk medication for bleeding issues on Eliquis and she would like to get off of the Eliquis.  We discussed the procedure in great deal tell the risk the procedure she will consider this and think about scheduling the procedure Dr. Bernstein.            8/19/2024  16:06 EDT  Electronically signed by ROMMEL Husain, 08/19/24, 8:19 AM EDT.

## 2024-08-21 DIAGNOSIS — Z91.148 NONCOMPLIANCE WITH MEDICATION TREATMENT DUE TO UNDERUSE OF MEDICATION: ICD-10-CM

## 2024-08-21 DIAGNOSIS — I48.11 LONGSTANDING PERSISTENT ATRIAL FIBRILLATION: Primary | ICD-10-CM

## 2024-08-22 ENCOUNTER — PREP FOR SURGERY (OUTPATIENT)
Dept: OTHER | Facility: HOSPITAL | Age: 79
End: 2024-08-22
Payer: MEDICARE

## 2024-08-22 DIAGNOSIS — I48.91 ATRIAL FIBRILLATION BY ELECTROCARDIOGRAM: Primary | Chronic | ICD-10-CM

## 2024-08-22 PROBLEM — I48.11 LONGSTANDING PERSISTENT ATRIAL FIBRILLATION: Status: ACTIVE | Noted: 2024-08-21

## 2024-08-22 PROBLEM — Z91.148 NONCOMPLIANCE WITH MEDICATION TREATMENT DUE TO UNDERUSE OF MEDICATION: Status: ACTIVE | Noted: 2024-08-21

## 2024-08-22 RX ORDER — CEFAZOLIN SODIUM 2 G/100ML
2 INJECTION, SOLUTION INTRAVENOUS ONCE
OUTPATIENT
Start: 2024-08-22 | End: 2024-08-22

## 2024-08-22 RX ORDER — ACETAMINOPHEN 325 MG/1
650 TABLET ORAL EVERY 4 HOURS PRN
OUTPATIENT
Start: 2024-08-22

## 2024-08-22 RX ORDER — SODIUM CHLORIDE 0.9 % (FLUSH) 0.9 %
3 SYRINGE (ML) INJECTION EVERY 12 HOURS SCHEDULED
OUTPATIENT
Start: 2024-08-22

## 2024-08-22 RX ORDER — NITROGLYCERIN 0.4 MG/1
0.4 TABLET SUBLINGUAL
OUTPATIENT
Start: 2024-08-22

## 2024-08-22 RX ORDER — ONDANSETRON 2 MG/ML
4 INJECTION INTRAMUSCULAR; INTRAVENOUS EVERY 6 HOURS PRN
OUTPATIENT
Start: 2024-08-22

## 2024-08-22 RX ORDER — SODIUM CHLORIDE 9 MG/ML
40 INJECTION, SOLUTION INTRAVENOUS AS NEEDED
OUTPATIENT
Start: 2024-08-22

## 2024-08-22 RX ORDER — SODIUM CHLORIDE 0.9 % (FLUSH) 0.9 %
10 SYRINGE (ML) INJECTION AS NEEDED
OUTPATIENT
Start: 2024-08-22

## 2024-08-26 NOTE — NURSING NOTE
PRE-LAAO HISTORY  Roxie Bruce 1945   [address] [facility] [address]   [phone] (home)     Referring MD: Christopher Lopez APRN   Information obtained from: [x] Medical record review  [x] Patient report  Scheduled for: LAAO  implant on September 11, 2024 with Dr Dr. Bernstein  SDM Visit: Christopher Lopez APRN       CHADS-VASc Risk Assessment               4 Total Score    1 Hypertension    2 Age >/= 75    1 Sex: Female    Criteria that do not apply:    CHF    DM    PRIOR STROKE/TIA/THROMBO    Vascular Disease    Age 65-74             History & Risk Factors (prior to LAAO implant)  [id number] Risk Factors:  Congestive HF     [x] No   [] Yes  LV Dysfunction   [x] No   [] Yes  Hypertension      [] No   [x] Yes  Diabetes    [x] No   [] Yes  Stroke       [x] No   [] Yes  TIA       [x] No   [] Yes  Thromboembolism    [x] No   [] Yes  Vascular Disease   [x] No   [] Yes       If Yes, Type   [] Prior MI  [] PAD      [] Aortic Plaque    Statin if indicated          [x] No   [] Yes  HAS-BLED Risk Factors:  HTN (uncontrolled) [] No  [x] Yes  Abnormal Renal Fxn  [x] No  [] Yes  Abnormal Liver Fxn   [x] No  [] Yes  Stroke            [x] No  [] Yes  Bleeding event [x] No  [] Yes  Labile INR      [x] No  [] Yes  Alcohol  [] No  [x] Yes-occasional wine  Antiplatelets      [x] No  [] Yes  NSAIDS  [x] No  [] Yes      Indication: Noncompliance with medication treatment due to under-use of medication  Additional Stroke & Bleeding Risk Factors:  Increased Fall Risk   [] No  [x] Yes  Bleeding Event         [x] No  [] Yes      If Yes, Type      [] Intracranial [] Epistaxis   [] GI   [] Other    Rhythm History:  AFIBTYPE: persistent    Valvular AFib        [x] No  [] Yes       If Yes, Rheum Valve Disease []  No  [] Yes       If Yes, Mitral Valve Replacement [] No  [] Yes            If Yes, Mechanical?   [] No  [] Yes       If Yes, Mitral Valve Repair  [] No  [] Yes    Attempt at AFib Termination:  [] No  [x]  Yes       If Yes, pharmacologic CV    [x] No  [] Yes       If Yes, DC cardioversion  [] No  [x] Yes       If Yes, catheter ablation  [x] No  [] Yes            If Yes, Date of most recent: ________              If Yes, surgical ablation  [x] No  [] Yes           If Yes, Date of most recent: ________           Atrial Flutter    [x] No  [] Yes       If Yes, attempt at termination  [] No  [] Yes            If Yes, pharmacologic cardioversion [] No  [] Yes            If Yes, DC cardioversion  [] No  [] Yes            If Yes, catheter ablation  [] No  [] Yes            If Yes, Date of most recent: ________           AMOS Intervention    [x] No  [] Yes    Additional History & Risk Factors:  Cardiomyopathy   [x] No  [] Yes  Chronic Lung Disease  [x] No  [] Yes  Coronary Artery Disease  [x] No  [] Yes  Sleep Apnea    [x] No  [] Yes       If Yes, compliant with treatment [] No  [] Yes    Epicardial Approach Considered [x] No  [] Yes    Diagnostic Studies:  Last Echo:  [x] TTE Date: 7/9/24                  EF: 51.5%              LA: 3.5 cm            VHD- Mitral annular calcification is present. There is mild mitral valve prolapse of the anterior mitral leaflet. Mild mitral valve regurgitation is present. No significant mitral valve stenosis is present.         Ace, arb arni for EF < 40%       Pre-procedure blood thinner medications:  Continue uninterrupted Eliquis and start a daily 81 mg aspirin on the morning of surgery.           08/26/24 11:52 EDT   [facility] Revised 1/25/2024

## 2024-09-06 ENCOUNTER — PRE-ADMISSION TESTING (OUTPATIENT)
Dept: PREADMISSION TESTING | Facility: HOSPITAL | Age: 79
End: 2024-09-06
Payer: MEDICARE

## 2024-09-06 ENCOUNTER — HOSPITAL ENCOUNTER (OUTPATIENT)
Dept: CT IMAGING | Facility: HOSPITAL | Age: 79
Discharge: HOME | End: 2024-09-06
Payer: MEDICARE

## 2024-09-06 DIAGNOSIS — Z91.148 NONCOMPLIANCE WITH MEDICATION TREATMENT DUE TO UNDERUSE OF MEDICATION: ICD-10-CM

## 2024-09-06 DIAGNOSIS — I48.91 ATRIAL FIBRILLATION BY ELECTROCARDIOGRAM: Chronic | ICD-10-CM

## 2024-09-06 DIAGNOSIS — I48.11 LONGSTANDING PERSISTENT ATRIAL FIBRILLATION: ICD-10-CM

## 2024-09-06 LAB
ANION GAP SERPL CALCULATED.3IONS-SCNC: 9 MMOL/L (ref 5–15)
BUN SERPL-MCNC: 14 MG/DL (ref 8–23)
BUN/CREAT SERPL: 22.2 (ref 7–25)
CALCIUM SPEC-SCNC: 9.4 MG/DL (ref 8.6–10.5)
CHLORIDE SERPL-SCNC: 98 MMOL/L (ref 98–107)
CO2 SERPL-SCNC: 29 MMOL/L (ref 22–29)
CREAT SERPL-MCNC: 0.63 MG/DL (ref 0.57–1)
DEPRECATED RDW RBC AUTO: 43 FL (ref 37–54)
EGFRCR SERPLBLD CKD-EPI 2021: 90.9 ML/MIN/1.73
ERYTHROCYTE [DISTWIDTH] IN BLOOD BY AUTOMATED COUNT: 12.5 % (ref 12.3–15.4)
GLUCOSE SERPL-MCNC: 100 MG/DL (ref 65–99)
HCT VFR BLD AUTO: 45.4 % (ref 34–46.6)
HGB BLD-MCNC: 14.7 G/DL (ref 12–15.9)
MCH RBC QN AUTO: 30.2 PG (ref 26.6–33)
MCHC RBC AUTO-ENTMCNC: 32.4 G/DL (ref 31.5–35.7)
MCV RBC AUTO: 93.4 FL (ref 79–97)
PLATELET # BLD AUTO: 273 10*3/MM3 (ref 140–450)
PMV BLD AUTO: 10.2 FL (ref 6–12)
POTASSIUM SERPL-SCNC: 4.9 MMOL/L (ref 3.5–5.2)
RBC # BLD AUTO: 4.86 10*6/MM3 (ref 3.77–5.28)
SODIUM SERPL-SCNC: 136 MMOL/L (ref 136–145)
WBC NRBC COR # BLD AUTO: 7.19 10*3/MM3 (ref 3.4–10.8)

## 2024-09-06 PROCEDURE — 71275 CT ANGIOGRAPHY CHEST: CPT

## 2024-09-06 PROCEDURE — 85027 COMPLETE CBC AUTOMATED: CPT

## 2024-09-06 PROCEDURE — 36415 COLL VENOUS BLD VENIPUNCTURE: CPT

## 2024-09-06 PROCEDURE — 25510000001 IOPAMIDOL PER 1 ML: Performed by: INTERNAL MEDICINE

## 2024-09-06 PROCEDURE — 80048 BASIC METABOLIC PNL TOTAL CA: CPT

## 2024-09-06 RX ORDER — IOPAMIDOL 755 MG/ML
80 INJECTION, SOLUTION INTRAVASCULAR
Status: COMPLETED | OUTPATIENT
Start: 2024-09-06 | End: 2024-09-06

## 2024-09-06 RX ADMIN — IOPAMIDOL 80 ML: 755 INJECTION, SOLUTION INTRAVENOUS at 11:10

## 2024-09-06 NOTE — DISCHARGE INSTRUCTIONS
Dear Patient,    Drink plenty of fluids the day before your procedure to ensure you are well hydrated, unless otherwise directed by your physician.    Do NOT eat after midnight the night before your procedure.   You may have clear liquids only up to three hours before your scheduled arrival time (Water is best, but clear liquids can also include coffee without cream or milk, fruit juice without pulp, clear broth, and clear gelatin).  During the three hour pre-procedure timeframe, NOTHING BY MOUTH (NPO).    We encourage you to drink 8 ounces of water three to four hours before your scheduled arrival time.    Take your medications as instructed by your doctor.    Following your procedure, be sure to drink plenty of fluids to continue flushing the kidneys if dye was utilized during your procedure (cardiac catheterization)    Benefits of hydrating before and after your procedure include:    -Improved hydration helps prevent potential harm to the kidneys by flushing the contrast/dye used during your procedure (if applicable)    -Lower post-procedure complications    -Improved patient comfort     Do NOT smoke after midnight the night before your procedure.    Glasses and jewelry may be worn, but dentures must be removed prior to your procedure.    Leave any items you consider valuable at home.      MORNING of your Procedure, please bring the following:     -Photo ID and insurance card(s)    -ALL medications in their ORIGINAL CONTAINERS or accurate medication list.    -Co-pay and/or deductible required by your insurance   -Copy of living will or power of  document (if not brought to Pre-Admission Testing department)   -CPAP mask and tubing, not your machine (if applicable)   -Relaxation aids (music, books, magazines)    Family members may wait in [facility] waiting area during procedure.    Need to make arrangements for transportation prior to discharge.

## 2024-09-10 ENCOUNTER — HOSPITAL ENCOUNTER (INPATIENT)
Facility: HOSPITAL | Age: 79
LOS: 1 days | Discharge: HOME | DRG: 274 | End: 2024-09-10
Attending: INTERNAL MEDICINE | Admitting: INTERNAL MEDICINE
Payer: MEDICARE

## 2024-09-10 ENCOUNTER — READMISSION MANAGEMENT (OUTPATIENT)
Dept: CALL CENTER | Facility: HOSPITAL | Age: 79
End: 2024-09-10
Payer: MEDICARE

## 2024-09-10 VITALS
DIASTOLIC BLOOD PRESSURE: 73 MMHG | HEART RATE: 79 BPM | OXYGEN SATURATION: 99 % | RESPIRATION RATE: 10 BRPM | SYSTOLIC BLOOD PRESSURE: 134 MMHG

## 2024-09-10 DIAGNOSIS — I48.91 ATRIAL FIBRILLATION BY ELECTROCARDIOGRAM: Chronic | ICD-10-CM

## 2024-09-10 DIAGNOSIS — I48.11 LONGSTANDING PERSISTENT ATRIAL FIBRILLATION: ICD-10-CM

## 2024-09-10 DIAGNOSIS — Z91.148 NONCOMPLIANCE WITH MEDICATION TREATMENT DUE TO UNDERUSE OF MEDICATION: ICD-10-CM

## 2024-09-10 PROBLEM — I48.19 PERSISTENT ATRIAL FIBRILLATION: Status: ACTIVE | Noted: 2024-09-10

## 2024-09-10 LAB — ACT BLD: 324 SECONDS (ref 82–152)

## 2024-09-10 PROCEDURE — 25010000002 CEFAZOLIN PER 500 MG: Performed by: PHYSICIAN ASSISTANT

## 2024-09-10 PROCEDURE — 85347 COAGULATION TIME ACTIVATED: CPT

## 2024-09-10 PROCEDURE — C1889 IMPLANT/INSERT DEVICE, NOC: HCPCS | Performed by: INTERNAL MEDICINE

## 2024-09-10 PROCEDURE — 93662 INTRACARDIAC ECG (ICE): CPT | Performed by: INTERNAL MEDICINE

## 2024-09-10 PROCEDURE — 33340 PERQ CLSR TCAT L ATR APNDGE: CPT | Performed by: INTERNAL MEDICINE

## 2024-09-10 PROCEDURE — C1769 GUIDE WIRE: HCPCS | Performed by: INTERNAL MEDICINE

## 2024-09-10 PROCEDURE — 99152 MOD SED SAME PHYS/QHP 5/>YRS: CPT | Performed by: INTERNAL MEDICINE

## 2024-09-10 PROCEDURE — 25010000002 FENTANYL CITRATE (PF) 50 MCG/ML SOLUTION: Performed by: INTERNAL MEDICINE

## 2024-09-10 PROCEDURE — 25010000002 BUPIVACAINE 0.5 % SOLUTION: Performed by: INTERNAL MEDICINE

## 2024-09-10 PROCEDURE — C1760 CLOSURE DEV, VASC: HCPCS | Performed by: INTERNAL MEDICINE

## 2024-09-10 PROCEDURE — 25810000003 SODIUM CHLORIDE 0.9 % SOLUTION: Performed by: INTERNAL MEDICINE

## 2024-09-10 PROCEDURE — B24BZZZ ULTRASONOGRAPHY OF HEART WITH AORTA: ICD-10-PCS | Performed by: INTERNAL MEDICINE

## 2024-09-10 PROCEDURE — 25510000001 IOPAMIDOL PER 1 ML: Performed by: INTERNAL MEDICINE

## 2024-09-10 PROCEDURE — C1893 INTRO/SHEATH, FIXED,NON-PEEL: HCPCS | Performed by: INTERNAL MEDICINE

## 2024-09-10 PROCEDURE — C1894 INTRO/SHEATH, NON-LASER: HCPCS | Performed by: INTERNAL MEDICINE

## 2024-09-10 PROCEDURE — C1759 CATH, INTRA ECHOCARDIOGRAPHY: HCPCS | Performed by: INTERNAL MEDICINE

## 2024-09-10 PROCEDURE — 02L73DK OCCLUSION OF LEFT ATRIAL APPENDAGE WITH INTRALUMINAL DEVICE, PERCUTANEOUS APPROACH: ICD-10-PCS | Performed by: INTERNAL MEDICINE

## 2024-09-10 PROCEDURE — 25010000002 HEPARIN (PORCINE) PER 1000 UNITS: Performed by: INTERNAL MEDICINE

## 2024-09-10 PROCEDURE — 25010000002 MIDAZOLAM PER 1 MG: Performed by: INTERNAL MEDICINE

## 2024-09-10 PROCEDURE — 99153 MOD SED SAME PHYS/QHP EA: CPT | Performed by: INTERNAL MEDICINE

## 2024-09-10 DEVICE — LEFT ATRIAL APPENDAGE CLOSURE DEVICE WITH DELIVERY SYSTEM
Type: IMPLANTABLE DEVICE | Status: FUNCTIONAL
Brand: WATCHMAN FLX™ PRO

## 2024-09-10 DEVICE — CP WATCHMAN FLX PRO PROC: Type: IMPLANTABLE DEVICE | Status: FUNCTIONAL

## 2024-09-10 DEVICE — CP SYS WATCHMAN TRUSTEER ACCESS: Type: IMPLANTABLE DEVICE | Status: FUNCTIONAL

## 2024-09-10 RX ORDER — MIDAZOLAM HYDROCHLORIDE 1 MG/ML
INJECTION INTRAMUSCULAR; INTRAVENOUS
Status: DISCONTINUED | OUTPATIENT
Start: 2024-09-10 | End: 2024-09-10 | Stop reason: HOSPADM

## 2024-09-10 RX ORDER — FENTANYL CITRATE 50 UG/ML
INJECTION, SOLUTION INTRAMUSCULAR; INTRAVENOUS
Status: DISCONTINUED | OUTPATIENT
Start: 2024-09-10 | End: 2024-09-10 | Stop reason: HOSPADM

## 2024-09-10 RX ORDER — BUPIVACAINE HYDROCHLORIDE 5 MG/ML
INJECTION, SOLUTION PERINEURAL
Status: DISCONTINUED | OUTPATIENT
Start: 2024-09-10 | End: 2024-09-10 | Stop reason: HOSPADM

## 2024-09-10 RX ORDER — SODIUM CHLORIDE 0.9 % (FLUSH) 0.9 %
10 SYRINGE (ML) INJECTION AS NEEDED
Status: DISCONTINUED | OUTPATIENT
Start: 2024-09-10 | End: 2024-09-10 | Stop reason: HOSPADM

## 2024-09-10 RX ORDER — ASPIRIN 81 MG/1
81 TABLET, CHEWABLE ORAL DAILY
COMMUNITY

## 2024-09-10 RX ORDER — ASPIRIN 81 MG/1
81 TABLET, CHEWABLE ORAL DAILY
Status: DISCONTINUED | OUTPATIENT
Start: 2024-09-11 | End: 2024-09-10 | Stop reason: HOSPADM

## 2024-09-10 RX ORDER — SODIUM CHLORIDE 0.9 % (FLUSH) 0.9 %
3 SYRINGE (ML) INJECTION EVERY 12 HOURS SCHEDULED
Status: DISCONTINUED | OUTPATIENT
Start: 2024-09-10 | End: 2024-09-10 | Stop reason: HOSPADM

## 2024-09-10 RX ORDER — HEPARIN SODIUM 1000 [USP'U]/ML
INJECTION, SOLUTION INTRAVENOUS; SUBCUTANEOUS
Status: DISCONTINUED | OUTPATIENT
Start: 2024-09-10 | End: 2024-09-10 | Stop reason: HOSPADM

## 2024-09-10 RX ORDER — SODIUM CHLORIDE 9 MG/ML
INJECTION, SOLUTION INTRAVENOUS
Status: COMPLETED | OUTPATIENT
Start: 2024-09-10 | End: 2024-09-10

## 2024-09-10 RX ORDER — LIDOCAINE HYDROCHLORIDE 5 MG/ML
INJECTION, SOLUTION INFILTRATION; PERINEURAL
Status: DISCONTINUED | OUTPATIENT
Start: 2024-09-10 | End: 2024-09-10 | Stop reason: HOSPADM

## 2024-09-10 RX ORDER — ONDANSETRON 2 MG/ML
4 INJECTION INTRAMUSCULAR; INTRAVENOUS EVERY 6 HOURS PRN
Status: DISCONTINUED | OUTPATIENT
Start: 2024-09-10 | End: 2024-09-10 | Stop reason: HOSPADM

## 2024-09-10 RX ORDER — ETOMIDATE 2 MG/ML
INJECTION INTRAVENOUS
Status: DISCONTINUED | OUTPATIENT
Start: 2024-09-10 | End: 2024-09-10 | Stop reason: HOSPADM

## 2024-09-10 RX ORDER — ACETAMINOPHEN 325 MG/1
650 TABLET ORAL EVERY 4 HOURS PRN
Status: DISCONTINUED | OUTPATIENT
Start: 2024-09-10 | End: 2024-09-10 | Stop reason: HOSPADM

## 2024-09-10 RX ORDER — SODIUM CHLORIDE 9 MG/ML
40 INJECTION, SOLUTION INTRAVENOUS AS NEEDED
Status: DISCONTINUED | OUTPATIENT
Start: 2024-09-10 | End: 2024-09-10 | Stop reason: HOSPADM

## 2024-09-10 RX ORDER — NITROGLYCERIN 0.4 MG/1
0.4 TABLET SUBLINGUAL
Status: DISCONTINUED | OUTPATIENT
Start: 2024-09-10 | End: 2024-09-10 | Stop reason: HOSPADM

## 2024-09-10 RX ORDER — IOPAMIDOL 755 MG/ML
INJECTION, SOLUTION INTRAVASCULAR
Status: DISCONTINUED | OUTPATIENT
Start: 2024-09-10 | End: 2024-09-10 | Stop reason: HOSPADM

## 2024-09-10 RX ADMIN — SODIUM CHLORIDE 2 G: 900 INJECTION INTRAVENOUS at 11:30

## 2024-09-10 NOTE — NURSING NOTE
LAAO Procedure Information   Roxie Bruce 1945   [address] [facility] [address]   [phone] (home)       AMOS Orifice Maximal Width:  29 mm  Cumulative Air Kerma:  41 mGy  Contrast Volume:  50 mL  Dose Area Product:  299.41 ?Gy-M2

## 2024-09-10 NOTE — OP NOTE
" LEFT ATRIAL APPENDAGE CLOSURE (LAAC)                                 Watchman Implant    PROCEDURES PERFORMED:    Left atrial appendage closure with 35 mm Watchman FLX Pro device with intracardiac echocardiography.  Patient was admitted to the hospital for this procedure.  This is an inpatient procedure.    PREPROCEDURAL DIAGNOSES:    1. persistent Atrial fibrillation  2. [id number] score of 4  3. HASBLED score of 3    REFERRING PHYSICIAN:    Dr SWAIN    POST PROCEDURE DIANGOSES:  As above.    INDICATION FOR PROCEDURE:  Briefly, Roxie Bruce is a 79 y.o. year old female with a history of bleeding and high risk of cardioembolic stroke.    ANTICOAGULATION STRATEGY PRIOR TO AND POST PROCEDURE:  DOAC.  The last dose of anticoagulant was confirmed to have been taken this AM.    PT/INR:  No results found for: \"LABPROT\", \"INR\"  PTT:  No results found for: \"APTT\"    CBC: No results found for: \"WBC\", \"RBC\", \"HGB\", \"HCT\", \"MCV\", \"RDW\", \"PLT\"  BMP:   No results found for: \"NA\", \"K\", \"CL\", \"CO2\", \"BUN\", \"CREATININE\", \"LABCREA\", \"EGFR\", \"GLU\", \"LABGLUC\"    Vital Signs: /78   Pulse 80   Resp 10   SpO2 100%  O2 Flow Rate (L/min): 2 L/min   Admit Weight:     BMI: There is no height or weight on file to calculate BMI.    MODERATE SEDATION FOR PROCEDURE:    Moderate sedation was given during this procedure.    I supervised and directed RN to administer this sedation.  This staff member also monitored the patient's hemodynamic and respiratory status and response to these medications.  Please see the full detailed procedure report generated by the electrophysiology laboratory staff.  The patient tolerated moderate sedation well.  There were no complications regarding sedation.  The total dose of fentanyl was 25 mcg and the total dose of midazolam was 1 mg.  The total dose of Brevital was 5 mg.  First sedation was administered at 1146 and continued through 1219.    PROCEDURE NARRATIVE:    The patient was able to give " written informed consent after revisiting the key portions of the risk versus benefit profile of the procedure.  This discussion was framed by our lengthy conversations  (please see our detailed notes).  Patient verbalized strong understanding of this discussion and a strong desire to proceed with the procedure.  Please note that this detailed informed consent process utilized mutual and shared decision making process between all parties involved, principally the physician and patient, but also potentially with input from the patient's selected family and friends.    Please also note that the patient was seen and examined prior to this procedure by a non implanting physician who agreed that this patient would benefit from left atrial appendage closure as an alternative to long-term anticoagulation.  Please see this physician separate attestation.    The patient was brought to the EP Lab in the post absorptive state.      The patient was then prepared and draped in a routing sterile fashion.  Seldinger access was obtained at the right common femoral vein with a dual venipunctures utilizing ultrasound probe guided vascular access.  A J tip wire was advanced into the vascular space.  A pair of sheaths were placed into the inferior vena cava.  The patient was anticoagulated with unfractionated heparin in bolus and then continuous infusion to maintain an ACT of between 200 and 300 seconds.    A phased array intracardiac echocardiography probe was placed into the right atrium, right ventricle and after transseptal puncture also into the left atrium and left ventricle.  This was used for visualization of critical cardiac structures such as the fossa ovalis interatrial septum left atrial appendage left superior pulmonary vein mitral valve annulus and pericardial space.  This aspect of the procedure is a separate and independent part of the procedure which was critical for implantation of the Watchman.    We performed  transseptal puncture with a combination of fluoroscopic and echocardiographic guidance.    The Watchman delivery sheath was then placed in the left atrium under echocardiographic and fluoroscopic guidance safely.  Left atrial appendage was then cannulated with a 10 mm angled pigtail catheter.  This was again performed with a combination of echocardiographic and fluoroscopic guidance.  Contrast injection was also performed.  The Watchman delivery sheath was then placed into optimal position within left atrial appendage.  The left atrial appendage diameter was viewed in multiple projections on echocardiography as well as fluoroscopy with contrast injection into the left atrial appendage.  A 35 mm device was selected and delivered with the supplied delivery tools to the left atrial appendage safely.      PASS criteria were then evaluated and confirmed.      The device was then safely released.    Catheters and sheaths were withdrawn from the left atrium and then the body.  Hemostasis at the site of venous access was achieved after withdrawing the sheath from the body with a vascade closure device  and manual pressure.  Intracardiac echocardiogram demonstrated no pericardial effusion.    Protamine was given to reverse anticoagulation.    The patient was transferred to recovery in stable condition.    COMPLICATIONS: none    EBL: minimal    KEY PROCEDURAL FINDINGS:     35 mm Watchman FLX Pro implanted with all trabeculae covered and no catracho-device leak    POST PROCEDURAL PLAN:    Uninterrupted anticoagulation for not less than 45 days unless specially instructed otherwise by myself or another member of our EP physician team. The patient will also be taking 81 mg of aspirin.  An imaging ( ALEXANDER or CT scan ) will be obtained to assess for appropriate closure of the left atrial appendage occlusion device at 45 days post implant.    Please note that this patient was admitted specifically for this elective inpatient procedure.   This is an inpatient procedure.  Due to COVID-19 global pandemic and bed shortage we will endeavor to discharge this patient later today rather than tomorrow.  I think that he will tolerate this well.  We will of course wait until later in the afternoon to make a final decision.  The patient will be seen at our office per protocol for this procedure.    Neri Bernstein DO, FACC, [facility]  Cardiac Electrophysiologist  [facility]

## 2024-09-10 NOTE — INTERVAL H&P NOTE
H&P reviewed. The patient was examined and there are no changes to the H&P.       EP Pre-Procedure Report  Cardiovascular Laboratory  [facility]    Patient:  Roxie Bruce  :  1945    DATE: 9/10/2024      MEDICATIONS:  Prior to Admission medications    Medication Sig Start Date End Date Taking? Authorizing Provider   apixaban (ELIQUIS) 5 MG tablet tablet Take 1 tablet by mouth 2 (Two) Times a Day. 24  Yes Bassam Montague PA   aspirin 81 MG chewable tablet Chew 1 tablet Daily.   Yes Dari Goldman MD   azelastine (ASTELIN) 0.1 % nasal spray 2 sprays into the nostril(s) as directed by provider 2 (Two) Times a Day. Use in each nostril as directed  Patient taking differently: 1 spray into the nostril(s) as directed by provider Daily. Use in each nostril as directed 24  Yes Justus Esquivel MD   cholecalciferol (VITAMIN D3) 1000 UNITS tablet Take 2 tablets by mouth Daily.   Yes ProviderDari MD   Diclofenac Sodium (VOLTAREN) 1 % gel gel Apply 4 g topically to the appropriate area as directed Take As Directed. Apply 2-4 grams by topical route 3-4 times every day to the affected area(s) PRN 24  Yes Christopher Lopez APRN   estradiol (CLIMARA) 0.05 MG/24HR patch Place 1 patch on the skin as directed by provider 1 (One) Time Per Week.  Patient taking differently: Place 1 patch on the skin as directed by provider 1 (One) Time Per Week. Every Sun 24  Yes Justus Esquivel MD   FOLIC ACID PO Take 1 tablet/day by mouth Daily.   Yes Dari Goldman MD   metoprolol tartrate (LOPRESSOR) 25 MG tablet Take 0.5 tablets by mouth 2 (Two) Times a Day. 24  Yes Bassam Montague PA   Probiotic Product (PROBIOTIC ADVANCED PO) Take 1 tablet by mouth Daily.   Yes Dari Goldman MD   Calcium Carbonate (CALTRATE 600 PO) Take 1 capsule by mouth Daily.  Patient not taking: Reported on 2024  9/10/24  Dari Goldman MD    calcium carbonate (TUMS) 500 MG chewable tablet Chew 1 tablet As Needed.  9/10/24  Provider, MD Dari       Past medical & surgical history, social and family history reviewed in the electronic medical record.    Physical Exam:    Vitals:   Vitals:    09/10/24 0823   BP: 165/85   Pulse: 69   SpO2: 95%    There were no vitals filed for this visit.There is no height or weight on file to calculate BMI.    GENERAL: No apparent distress.  No significant changes since last exam.  CHEST: Clear to auscultation bilaterally no stridor no wheeze.  CV: S1, S2, regular without Murmurs, Rubs or Gallops  EXTREMITIES: No edema.    Results from last 7 days   Lab Units 09/06/24  0929   SODIUM mmol/L 136   POTASSIUM mmol/L 4.9   CHLORIDE mmol/L 98   CO2 mmol/L 29.0   BUN mg/dL 14   CREATININE mg/dL 0.63   GLUCOSE mg/dL 100*   CALCIUM mg/dL 9.4     Results from last 7 days   Lab Units 09/06/24  0929   WBC 10*3/mm3 7.19   HEMOGLOBIN g/dL 14.7   HEMATOCRIT % 45.4   PLATELETS 10*3/mm3 273       IMPRESSION:Cheif Complaint EP: Atrial Fibrillation      PLAN:  Procedure to perform: LAAO        Electronically signed by ROMMEL Aguero, 09/10/24, 9:45 AM EDT.

## 2024-09-11 ENCOUNTER — TRANSITIONAL CARE MANAGEMENT TELEPHONE ENCOUNTER (OUTPATIENT)
Dept: CALL CENTER | Facility: HOSPITAL | Age: 79
End: 2024-09-11
Payer: MEDICARE

## 2024-09-11 DIAGNOSIS — Z95.818 PRESENCE OF WATCHMAN LEFT ATRIAL APPENDAGE CLOSURE DEVICE: Primary | ICD-10-CM

## 2024-09-11 DIAGNOSIS — I48.91 ATRIAL FIBRILLATION BY ELECTROCARDIOGRAM: ICD-10-CM

## 2024-09-11 DIAGNOSIS — I48.11 LONGSTANDING PERSISTENT ATRIAL FIBRILLATION: ICD-10-CM

## 2024-09-11 NOTE — OUTREACH NOTE
Call Center TCM Note      Flowsheet Row Responses   [facility] patient discharged from? [place]   Does the patient have one of the following disease processes/diagnoses(primary or secondary)? General Surgery   TCM attempt successful? Yes   Call start time 1404   Call end time 1408   Discharge diagnosis Left atrial appendage closure with 35 mm Watchman FLX Pro device   Meds reviewed with patient/caregiver? Yes   Is the patient having any side effects they believe may be caused by any medication additions or changes? No   Does the patient have all medications related to this admission filled (includes all antibiotics, pain medications, etc.) Yes   Prescription comments Pt understands updates to Azelastine, Estradiol   Is the patient taking all medications as directed (includes completed medication regime)? Yes   Comments Pt would like TCM APPT but only available date within TCM APPT time frame (by 09/24) is this Friday 08/13 and she feels this is too soon, if PCP can reveiw schedule and call pt for TCM APPT.   Does the patient have an appointment with their PCP within 7-14 days of discharge? No   Has home health visited the patient within 72 hours of discharge? N/A   Psychosocial issues? No   Did the patient receive a copy of their discharge instructions? Yes   Nursing interventions Reviewed instructions with patient   What is the patient's perception of their health status since discharge? Improving   Nursing interventions Nurse provided patient education   Is the patient /caregiver able to teach back basic post-op care? Continue use of incentive spirometry at least 1 week post discharge, Take showers only when approved by MD-sponge bathe until then, Do not remove steri-strips, Lifting as instructed by MD in discharge instructions, No tub bath, swimming, or hot tub until instructed by MD, Practice 'cough and deep breath', Drive as instructed by MD in discharge instructions, Keep incision areas clean,dry and  protected   Is the patient/caregiver able to teach back signs and symptoms of incisional infection? Increased redness, swelling or pain at the incisonal site, Pus or odor from incision, Fever, Increased drainage or bleeding, Incisional warmth   Is the patient/caregiver able to teach back steps to recovery at home? Set small, achievable goals for return to baseline health, Practice good oral hygiene, Eat a well-balance diet, Rest and rebuild strength, gradually increase activity, Weigh daily, Make a list of questions for surgeon's appointment   If the patient is a current smoker, are they able to teach back resources for cessation? Not a smoker   Is the patient/caregiver able to teach back the hierarchy of who to call/visit for symptoms/problems? PCP, Specialist, Home health nurse, Urgent Care, ED, 911 Yes   TCM call completed? Yes   Wrap up additional comments D/C DX: Left atrial appendage closure with 35 mm Watchman FLX Pro device - Pt doing well, taking it easy, keeping feet up at rest. No questions. Pt will follow up from procedure in 11/2024. Pt would like TCM APPT but only available date within TCM APPT time frame (by 09/24) is this Friday 08/13 and she feels this is too soon, if PCP can reveiw schedule and call pt for TCM APPT.   Call end time [id number]            Brittanie Menard MA    9/11/2024, 14:15 EDT

## 2024-09-11 NOTE — OUTREACH NOTE
Prep Survey      Flowsheet Row Responses   [facility] patient discharged from? [place]   Is LACE score < 7 ? Yes   Eligibility [facility]   Date of Admission 09/10/24   Date of Discharge 09/10/24   Discharge Disposition Home or Self Care   Discharge diagnosis Left atrial appendage closure with 35 mm Watchman FLX Pro device   Does the patient have one of the following disease processes/diagnoses(primary or secondary)? General Surgery   Does the patient have Home health ordered? No   Is there a DME ordered? No   Medication alerts for this patient see avs--eliquis   Prep survey completed? Yes            Christina VERA - Registered Nurse

## 2024-09-17 ENCOUNTER — LAB (OUTPATIENT)
Dept: LAB | Facility: HOSPITAL | Age: 79
End: 2024-09-17
Payer: MEDICARE

## 2024-09-17 DIAGNOSIS — M05.79 RHEUMATOID ARTHRITIS WITH RHEUMATOID FACTOR OF MULTIPLE SITES WITHOUT ORGAN OR SYSTEMS INVOLVEMENT: ICD-10-CM

## 2024-09-17 DIAGNOSIS — Z79.899 HIGH RISK MEDICATION USE: ICD-10-CM

## 2024-09-17 LAB
ALBUMIN SERPL-MCNC: 4.3 G/DL (ref 3.5–5.2)
ALBUMIN/GLOB SERPL: 2 G/DL
ALP SERPL-CCNC: 77 U/L (ref 39–117)
ALT SERPL W P-5'-P-CCNC: 19 U/L (ref 1–33)
ANION GAP SERPL CALCULATED.3IONS-SCNC: 11 MMOL/L (ref 5–15)
AST SERPL-CCNC: 22 U/L (ref 1–32)
BASOPHILS # BLD MANUAL: 0.07 10*3/MM3 (ref 0–0.2)
BASOPHILS NFR BLD MANUAL: 1 % (ref 0–1.5)
BILIRUB SERPL-MCNC: 0.8 MG/DL (ref 0–1.2)
BUN SERPL-MCNC: 12 MG/DL (ref 8–23)
BUN/CREAT SERPL: 16.9 (ref 7–25)
CALCIUM SPEC-SCNC: 9.4 MG/DL (ref 8.6–10.5)
CHLORIDE SERPL-SCNC: 96 MMOL/L (ref 98–107)
CO2 SERPL-SCNC: 27 MMOL/L (ref 22–29)
CREAT SERPL-MCNC: 0.71 MG/DL (ref 0.57–1)
CRP SERPL-MCNC: <0.3 MG/DL (ref 0–0.5)
DEPRECATED RDW RBC AUTO: 38.3 FL (ref 37–54)
EGFRCR SERPLBLD CKD-EPI 2021: 87.2 ML/MIN/1.73
EOSINOPHIL # BLD MANUAL: 0.13 10*3/MM3 (ref 0–0.4)
EOSINOPHIL NFR BLD MANUAL: 2 % (ref 0.3–6.2)
ERYTHROCYTE [DISTWIDTH] IN BLOOD BY AUTOMATED COUNT: 11.7 % (ref 12.3–15.4)
ERYTHROCYTE [SEDIMENTATION RATE] IN BLOOD: 5 MM/HR (ref 0–30)
GLOBULIN UR ELPH-MCNC: 2.2 GM/DL
GLUCOSE SERPL-MCNC: 85 MG/DL (ref 65–99)
HCT VFR BLD AUTO: 41.9 % (ref 34–46.6)
HGB BLD-MCNC: 14.1 G/DL (ref 12–15.9)
LYMPHOCYTES # BLD MANUAL: 1.18 10*3/MM3 (ref 0.7–3.1)
LYMPHOCYTES NFR BLD MANUAL: 10.1 % (ref 5–12)
MCH RBC QN AUTO: 30.3 PG (ref 26.6–33)
MCHC RBC AUTO-ENTMCNC: 33.7 G/DL (ref 31.5–35.7)
MCV RBC AUTO: 90.1 FL (ref 79–97)
MONOCYTES # BLD: 0.66 10*3/MM3 (ref 0.1–0.9)
NEUTROPHILS # BLD AUTO: 4.47 10*3/MM3 (ref 1.7–7)
NEUTROPHILS NFR BLD MANUAL: 68.7 % (ref 42.7–76)
PLAT MORPH BLD: NORMAL
PLATELET # BLD AUTO: 270 10*3/MM3 (ref 140–450)
PMV BLD AUTO: 10.6 FL (ref 6–12)
POIKILOCYTOSIS BLD QL SMEAR: ABNORMAL
POTASSIUM SERPL-SCNC: 4.4 MMOL/L (ref 3.5–5.2)
PROT SERPL-MCNC: 6.5 G/DL (ref 6–8.5)
RBC # BLD AUTO: 4.65 10*6/MM3 (ref 3.77–5.28)
SODIUM SERPL-SCNC: 134 MMOL/L (ref 136–145)
VARIANT LYMPHS NFR BLD MANUAL: 18.2 % (ref 19.6–45.3)
WBC MORPH BLD: NORMAL
WBC NRBC COR # BLD AUTO: 6.5 10*3/MM3 (ref 3.4–10.8)

## 2024-09-17 PROCEDURE — 80053 COMPREHEN METABOLIC PANEL: CPT

## 2024-09-17 PROCEDURE — 86140 C-REACTIVE PROTEIN: CPT

## 2024-09-17 PROCEDURE — 85652 RBC SED RATE AUTOMATED: CPT

## 2024-09-17 PROCEDURE — 36415 COLL VENOUS BLD VENIPUNCTURE: CPT

## 2024-09-17 PROCEDURE — 85027 COMPLETE CBC AUTOMATED: CPT

## 2024-09-17 PROCEDURE — 85007 BL SMEAR W/DIFF WBC COUNT: CPT

## 2024-09-19 ENCOUNTER — OFFICE VISIT (OUTPATIENT)
Dept: FAMILY MEDICINE CLINIC | Facility: CLINIC | Age: 79
End: 2024-09-19
Payer: MEDICARE

## 2024-09-19 VITALS
BODY MASS INDEX: 20.6 KG/M2 | OXYGEN SATURATION: 99 % | TEMPERATURE: 98 F | RESPIRATION RATE: 16 BRPM | SYSTOLIC BLOOD PRESSURE: 136 MMHG | HEIGHT: 66 IN | HEART RATE: 82 BPM | WEIGHT: 128.2 LBS | DIASTOLIC BLOOD PRESSURE: 88 MMHG

## 2024-09-19 DIAGNOSIS — M05.79 RHEUMATOID ARTHRITIS WITH RHEUMATOID FACTOR OF MULTIPLE SITES WITHOUT ORGAN OR SYSTEMS INVOLVEMENT: ICD-10-CM

## 2024-09-19 DIAGNOSIS — D36.13 NEUROMA OF FOOT: ICD-10-CM

## 2024-09-19 DIAGNOSIS — I10 ESSENTIAL HYPERTENSION: Primary | ICD-10-CM

## 2024-09-19 DIAGNOSIS — I48.91 ATRIAL FIBRILLATION, UNSPECIFIED TYPE: ICD-10-CM

## 2024-09-23 ENCOUNTER — OFFICE VISIT (OUTPATIENT)
Age: 79
End: 2024-09-23
Payer: MEDICARE

## 2024-09-23 VITALS
TEMPERATURE: 96.8 F | WEIGHT: 129.8 LBS | HEIGHT: 66 IN | BODY MASS INDEX: 20.86 KG/M2 | DIASTOLIC BLOOD PRESSURE: 86 MMHG | SYSTOLIC BLOOD PRESSURE: 130 MMHG | HEART RATE: 70 BPM

## 2024-09-23 DIAGNOSIS — Z79.899 HIGH RISK MEDICATION USE: ICD-10-CM

## 2024-09-23 DIAGNOSIS — M05.79 RHEUMATOID ARTHRITIS WITH RHEUMATOID FACTOR OF MULTIPLE SITES WITHOUT ORGAN OR SYSTEMS INVOLVEMENT: Primary | ICD-10-CM

## 2024-09-23 PROCEDURE — G2211 COMPLEX E/M VISIT ADD ON: HCPCS | Performed by: INTERNAL MEDICINE

## 2024-09-23 PROCEDURE — 3075F SYST BP GE 130 - 139MM HG: CPT | Performed by: INTERNAL MEDICINE

## 2024-09-23 PROCEDURE — 3079F DIAST BP 80-89 MM HG: CPT | Performed by: INTERNAL MEDICINE

## 2024-09-23 PROCEDURE — 99214 OFFICE O/P EST MOD 30 MIN: CPT | Performed by: INTERNAL MEDICINE

## 2024-10-18 ENCOUNTER — HOSPITAL ENCOUNTER (OUTPATIENT)
Dept: CARDIOLOGY | Facility: HOSPITAL | Age: 79
Discharge: HOME | End: 2024-10-18
Payer: MEDICARE

## 2024-10-18 VITALS
HEART RATE: 70 BPM | RESPIRATION RATE: 16 BRPM | DIASTOLIC BLOOD PRESSURE: 78 MMHG | BODY MASS INDEX: 20.73 KG/M2 | SYSTOLIC BLOOD PRESSURE: 142 MMHG | HEIGHT: 66 IN | OXYGEN SATURATION: 97 % | WEIGHT: 129 LBS

## 2024-10-18 DIAGNOSIS — I48.11 LONGSTANDING PERSISTENT ATRIAL FIBRILLATION: ICD-10-CM

## 2024-10-18 DIAGNOSIS — Z95.818 PRESENCE OF WATCHMAN LEFT ATRIAL APPENDAGE CLOSURE DEVICE: ICD-10-CM

## 2024-10-18 PROCEDURE — 25010000002 FENTANYL CITRATE (PF) 50 MCG/ML SOLUTION: Performed by: INTERNAL MEDICINE

## 2024-10-18 PROCEDURE — 76376 3D RENDER W/INTRP POSTPROCES: CPT

## 2024-10-18 PROCEDURE — 93312 ECHO TRANSESOPHAGEAL: CPT

## 2024-10-18 PROCEDURE — 93320 DOPPLER ECHO COMPLETE: CPT

## 2024-10-18 PROCEDURE — 25010000002 MIDAZOLAM PER 1 MG: Performed by: INTERNAL MEDICINE

## 2024-10-18 PROCEDURE — 93325 DOPPLER ECHO COLOR FLOW MAPG: CPT

## 2024-10-18 PROCEDURE — C8925 2D TEE W OR W/O FOL W/CON,IN: HCPCS

## 2024-10-18 RX ORDER — CLOPIDOGREL BISULFATE 75 MG/1
75 TABLET ORAL DAILY
Qty: 143 TABLET | Refills: 0 | Status: SHIPPED | OUTPATIENT
Start: 2024-10-18 | End: 2025-03-10

## 2024-10-18 RX ORDER — MIDAZOLAM HYDROCHLORIDE 1 MG/ML
INJECTION INTRAMUSCULAR; INTRAVENOUS
Status: COMPLETED | OUTPATIENT
Start: 2024-10-18 | End: 2024-10-18

## 2024-10-18 RX ORDER — FENTANYL CITRATE 50 UG/ML
INJECTION, SOLUTION INTRAMUSCULAR; INTRAVENOUS
Status: COMPLETED | OUTPATIENT
Start: 2024-10-18 | End: 2024-10-18

## 2024-10-18 RX ADMIN — MIDAZOLAM 1 MG: 1 INJECTION INTRAMUSCULAR; INTRAVENOUS at 08:57

## 2024-10-18 RX ADMIN — FENTANYL CITRATE 25 MCG: 50 INJECTION, SOLUTION INTRAMUSCULAR; INTRAVENOUS at 09:00

## 2024-10-18 RX ADMIN — MIDAZOLAM 1 MG: 1 INJECTION INTRAMUSCULAR; INTRAVENOUS at 09:00

## 2024-10-18 RX ADMIN — FENTANYL CITRATE 25 MCG: 50 INJECTION, SOLUTION INTRAMUSCULAR; INTRAVENOUS at 09:05

## 2024-10-18 RX ADMIN — MIDAZOLAM 1 MG: 1 INJECTION INTRAMUSCULAR; INTRAVENOUS at 09:05

## 2024-10-18 RX ADMIN — FENTANYL CITRATE 25 MCG: 50 INJECTION, SOLUTION INTRAMUSCULAR; INTRAVENOUS at 08:57

## 2024-10-18 NOTE — H&P
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, , retired teacher    CC: Afib, s/p Watchman    PROBLEM LIST:   Persistent atrial fibrillation  New onset of unknown duration, 2024  [id number] 4, on Eliquis (interfered with taking Methotrexate)  2024, ECV  9/10/2024 35 mm Watchman FLX Pro device ([place])  Valvular heart disease   echo: EF 51%, AV sclerosis without stenosis, mild MAC, mild MVP/MR  HTN  HLD  RA/OA  Surgical history:  Hysterectomy        BRIEF HPI: Roxie Bruce is a 79-year-old female with persistent atrial fib, hypertension, hyperlipidemia, and rheumatoid arthritis who presents today for ALEXANDER to follow-up watchman implantation performed 9/10/2024 by Dr. Neri Bernstein. Eliquis therapy interfered with rheumatoid arthritis treatment therefore Watchman device was recommended.  She denies tachypalpitations, chest pain, shortness of breath, edema, and presyncope/syncope.  She has been compliant with Eliquis and aspirin.        Allergies:      Allergies   Allergen Reactions    Lisinopril Angioedema    Hydroxychloroquine Nausea And Vomiting, Anxiety and Confusion       MEDICATIONS:  Current Outpatient Medications   Medication Instructions    apixaban (ELIQUIS) 5 mg, Oral, 2 Times Daily    aspirin 81 mg, Daily    azelastine (ASTELIN) 0.1 % nasal spray 2 sprays, Nasal, 2 Times Daily, Use in each nostril as directed    cholecalciferol (VITAMIN D3) 2,000 Units, Daily    Diclofenac Sodium (VOLTAREN) 4 g, Topical, Take As Directed, Apply 2-4 grams by topical route 3-4 times every day to the affected area(s) PRN    estradiol (CLIMARA) 0.05 MG/24HR patch 1 patch, Transdermal, Weekly    FOLIC ACID PO 1 tablet/day, Daily    metoprolol tartrate (LOPRESSOR) 12.5 mg,  "Oral, 2 Times Daily    Probiotic Product (PROBIOTIC ADVANCED PO) 1 tablet, Daily       Past medical & surgical history, social and family history reviewed in the electronic medical record.    ROS: Pertinent positives listed in the HPI and problem list above. All others reviewed and negative.     Physical Exam:   /97   Pulse 76   Ht 167.6 cm (66\")   Wt 58.5 kg (129 lb)   SpO2 100%   BMI 20.82 kg/m²     Constitutional:    Alert, cooperative, in no acute distress   Neck:     No JVD, adenopathy, or thyromegaly noted.    Heart:    Irregularly irregular rhythm and normal rate, normal S1 and S2, no murmur, gallop, rub, or click.    Lungs:     Clear to auscultation bilaterally, normal effort.   Abdomen:     Soft nontender, nondistended, normal bowel sounds   Extremities:   No gross deformities, edema, clubbing, or cyanosis.    Pulses:   Peripheral pulses palpable and equal bilaterally.         Labs and Diagnostic Data:          Lab Results   Component Value Date    CHOL 184 06/07/2024    TRIG 50 06/07/2024     (H) 06/07/2024    LDL 69 06/07/2024    AST 22 09/17/2024    ALT 19 09/17/2024                   EKG/Tele: Atrial fibrillation, controlled ventricular rate    IMPRESSION:  Roxie Bruce is a 79-year-old female with persistent atrial fib, hypertension, hyperlipidemia, and rheumatoid arthritis who presents today for ALEXANDER to follow-up watchman implantation performed 9/10/2024 by Dr. Neri Bernstein. Eliquis therapy interfered with rheumatoid arthritis treatment therefore Watchman device was recommended.  She has been compliant with aspirin and Eliquis.    PLAN:  Procedure to perform: ALEXANDER.  Procedure, risk, and benefits were discussed with the patient and family at bedside. She understands and wishes to proceed.   Further recommendations to follow.     BROOKS Burgess    "

## 2024-11-25 ENCOUNTER — OFFICE VISIT (OUTPATIENT)
Dept: CARDIOLOGY | Facility: CLINIC | Age: 79
End: 2024-11-25
Payer: MEDICARE

## 2024-11-25 VITALS
OXYGEN SATURATION: 100 % | HEIGHT: 66 IN | BODY MASS INDEX: 21.08 KG/M2 | SYSTOLIC BLOOD PRESSURE: 142 MMHG | DIASTOLIC BLOOD PRESSURE: 78 MMHG | HEART RATE: 77 BPM | WEIGHT: 131.2 LBS

## 2024-11-25 DIAGNOSIS — M05.20 RHEUMATOID ARTERITIS: ICD-10-CM

## 2024-11-25 DIAGNOSIS — I48.11 LONGSTANDING PERSISTENT ATRIAL FIBRILLATION: Primary | Chronic | ICD-10-CM

## 2024-11-25 DIAGNOSIS — I10 ESSENTIAL HYPERTENSION: Chronic | ICD-10-CM

## 2024-11-25 RX ORDER — METOPROLOL TARTRATE 25 MG/1
12.5 TABLET, FILM COATED ORAL 2 TIMES DAILY
Qty: 90 TABLET | Refills: 3 | Status: SHIPPED | OUTPATIENT
Start: 2024-11-25

## 2024-11-25 NOTE — PROGRESS NOTES
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 In this patient underwent watchman LAAO device implant.  The 45-day ALEXANDER showed that the watchman was well-seated and well compressed with no periprosthetic flow.  EF was normal.  She has been feeling well overall.  She exercises daily and watches her blood pressure at home.  She denies palpitations, lightheadedness, dizziness and syncope.  She is currently on aspirin and Plavix and denies any bleeding complications.    Cardiac PMH: (Old records have been reviewed and summarized below)  Afib  New onset of unknown duration  Chadvasc=4. Eliquis 5mg bID  Watchman 35 mm LAAO device 8/22/24  ALEXANDER, 10/18/24: Watchman well seated and well compressed with no catracho-prosthetic flow or device related thrombus seen. EF 51-55%   Valvular heart disease  Echocardiogram July 2024 EF 51% aortic valve sclerosis without stenosis mild mitral annular calcification with mild mitral valve prolapse of the anterior leaflet mild MR  HTN: Intolerance to Zestril due to Angioedema.   RA/OA   HLD    Past Medical History, Past Surgical History, Family history, Social History, and Medications were all reviewed with the patient today and updated as necessary.       Current Outpatient Medications:     aspirin 81 MG chewable tablet, Chew 1 tablet Daily., Disp: , Rfl:     azelastine (ASTELIN) 0.1 % nasal spray, 2 sprays into the nostril(s) as directed by provider 2 (Two) Times a Day. Use in each nostril as directed, Disp: 30 mL, Rfl: 12    cholecalciferol (VITAMIN D3) 1000 UNITS tablet, Take 2 tablets by mouth Daily., Disp: , Rfl:     clopidogrel (PLAVIX) 75 MG tablet, Take 1 tablet by mouth Daily for 143 days., Disp: 143 tablet, Rfl: 0    Diclofenac Sodium (VOLTAREN) 1 % gel gel, Apply 4 g topically to the appropriate area as  "directed Take As Directed. Apply 2-4 grams by topical route 3-4 times every day to the affected area(s) PRN, Disp: 100 g, Rfl: 3    estradiol (CLIMARA) 0.05 MG/24HR patch, Place 1 patch on the skin as directed by provider 1 (One) Time Per Week., Disp: 12 each, Rfl: 4    FOLIC ACID PO, Take 1 tablet/day by mouth Daily., Disp: , Rfl:     metoprolol tartrate (LOPRESSOR) 25 MG tablet, Take 0.5 tablets by mouth 2 (Two) Times a Day., Disp: 90 tablet, Rfl: 3    Probiotic Product (PROBIOTIC ADVANCED PO), Take 1 tablet by mouth Daily., Disp: , Rfl:       Allergies   Allergen Reactions    Lisinopril Angioedema    Hydroxychloroquine Nausea And Vomiting, Anxiety and Confusion         PHYSICAL EXAM:    /78 (BP Location: Left arm, Patient Position: Sitting, Cuff Size: Adult)   Pulse 77   Ht 166.4 cm (65.5\")   Wt 59.5 kg (131 lb 3.2 oz)   SpO2 100%   BMI 21.50 kg/m²        Wt Readings from Last 5 Encounters:   11/25/24 59.5 kg (131 lb 3.2 oz)   10/18/24 58.5 kg (129 lb)   09/23/24 58.9 kg (129 lb 12.8 oz)   09/19/24 58.2 kg (128 lb 3.2 oz)   08/19/24 58.2 kg (128 lb 6.4 oz)       BP Readings from Last 5 Encounters:   11/25/24 142/78   10/18/24 142/78   09/23/24 130/86   09/19/24 136/88   09/10/24 134/73       Constitutional:       Appearance: Healthy appearance.   Neck:      Vascular: JVD normal.   Pulmonary:      Effort: Pulmonary effort is normal.      Breath sounds: Normal breath sounds.   Cardiovascular:      Normal rate. Regular rhythm. Normal S1. Normal S2.       Murmurs: There is no murmur.      No gallop.  No rub.   Pulses:     Intact distal pulses.   Edema:     Peripheral edema absent.   Neurological:      General: No focal deficit present.          Medical problems and test results were reviewed with the patient today.       EKG:   Procedures      ASSESSMENT   1.  Persistent: Patient denies recurrence of atrial fibrillation. [id number] score 4.  Status post watchman LAAO device implant.  Currently on aspirin " and Plavix.    2. HTN: Elevated today.  Patient will continue to monitor at home and notify us if blood pressure remains elevated.  Discussed adding medication but she would like to hold off at this time.  Continue lifestyle modifications of diet and regular exercise    3. VHD: Echocardiogram July 9, 2024 EF 51% aortic valve sclerosis without stenosis mild annular calcification present mild mitral valve prolapse of the anterior leaflet mild mitral valve regurgitation.    4.  Rheumatoid arthritis: Patient currently not needing methotrexate        PLAN  -Continue aspirin and Plavix.  Okay to stop Plavix on March 10, 2025. Continue aspirin lifelong.  -Continue other medications.  -Stable from a cardiac standpoint  -1 year ALEXANDER ordered for watchman surveillance    Return in about 9 months (around 8/25/2025).    Mya Baker, APRN

## 2024-12-10 ENCOUNTER — LAB (OUTPATIENT)
Dept: LAB | Facility: HOSPITAL | Age: 79
End: 2024-12-10
Payer: MEDICARE

## 2024-12-10 DIAGNOSIS — M05.79 RHEUMATOID ARTHRITIS WITH RHEUMATOID FACTOR OF MULTIPLE SITES WITHOUT ORGAN OR SYSTEMS INVOLVEMENT: ICD-10-CM

## 2024-12-10 DIAGNOSIS — Z79.899 HIGH RISK MEDICATION USE: ICD-10-CM

## 2024-12-10 LAB
ALBUMIN SERPL-MCNC: 4.2 G/DL (ref 3.5–5.2)
ALBUMIN/GLOB SERPL: 1.6 G/DL
ALP SERPL-CCNC: 81 U/L (ref 39–117)
ALT SERPL W P-5'-P-CCNC: 33 U/L (ref 1–33)
ANION GAP SERPL CALCULATED.3IONS-SCNC: 12.1 MMOL/L (ref 5–15)
AST SERPL-CCNC: 38 U/L (ref 1–32)
BASOPHILS # BLD AUTO: 0.06 10*3/MM3 (ref 0–0.2)
BASOPHILS NFR BLD AUTO: 0.9 % (ref 0–1.5)
BILIRUB SERPL-MCNC: 0.8 MG/DL (ref 0–1.2)
BUN SERPL-MCNC: 14 MG/DL (ref 8–23)
BUN/CREAT SERPL: 17.5 (ref 7–25)
CALCIUM SPEC-SCNC: 9.1 MG/DL (ref 8.6–10.5)
CHLORIDE SERPL-SCNC: 99 MMOL/L (ref 98–107)
CO2 SERPL-SCNC: 25.9 MMOL/L (ref 22–29)
CREAT SERPL-MCNC: 0.8 MG/DL (ref 0.57–1)
CRP SERPL-MCNC: <0.3 MG/DL (ref 0–0.5)
DEPRECATED RDW RBC AUTO: 38.2 FL (ref 37–54)
EGFRCR SERPLBLD CKD-EPI 2021: 75.5 ML/MIN/1.73
EOSINOPHIL # BLD AUTO: 0.19 10*3/MM3 (ref 0–0.4)
EOSINOPHIL NFR BLD AUTO: 2.8 % (ref 0.3–6.2)
ERYTHROCYTE [DISTWIDTH] IN BLOOD BY AUTOMATED COUNT: 11.7 % (ref 12.3–15.4)
ERYTHROCYTE [SEDIMENTATION RATE] IN BLOOD: 1 MM/HR (ref 0–30)
GLOBULIN UR ELPH-MCNC: 2.7 GM/DL
GLUCOSE SERPL-MCNC: 91 MG/DL (ref 65–99)
HCT VFR BLD AUTO: 43.1 % (ref 34–46.6)
HGB BLD-MCNC: 14.7 G/DL (ref 12–15.9)
IMM GRANULOCYTES # BLD AUTO: 0.01 10*3/MM3 (ref 0–0.05)
IMM GRANULOCYTES NFR BLD AUTO: 0.1 % (ref 0–0.5)
LYMPHOCYTES # BLD AUTO: 1.75 10*3/MM3 (ref 0.7–3.1)
LYMPHOCYTES NFR BLD AUTO: 26.1 % (ref 19.6–45.3)
MCH RBC QN AUTO: 30.7 PG (ref 26.6–33)
MCHC RBC AUTO-ENTMCNC: 34.1 G/DL (ref 31.5–35.7)
MCV RBC AUTO: 90 FL (ref 79–97)
MONOCYTES # BLD AUTO: 0.71 10*3/MM3 (ref 0.1–0.9)
MONOCYTES NFR BLD AUTO: 10.6 % (ref 5–12)
NEUTROPHILS NFR BLD AUTO: 3.98 10*3/MM3 (ref 1.7–7)
NEUTROPHILS NFR BLD AUTO: 59.5 % (ref 42.7–76)
NRBC BLD AUTO-RTO: 0 /100 WBC (ref 0–0.2)
PLATELET # BLD AUTO: 277 10*3/MM3 (ref 140–450)
PMV BLD AUTO: 10.8 FL (ref 6–12)
POTASSIUM SERPL-SCNC: 4.4 MMOL/L (ref 3.5–5.2)
PROT SERPL-MCNC: 6.9 G/DL (ref 6–8.5)
RBC # BLD AUTO: 4.79 10*6/MM3 (ref 3.77–5.28)
SODIUM SERPL-SCNC: 137 MMOL/L (ref 136–145)
WBC NRBC COR # BLD AUTO: 6.7 10*3/MM3 (ref 3.4–10.8)

## 2024-12-10 PROCEDURE — 80053 COMPREHEN METABOLIC PANEL: CPT

## 2024-12-10 PROCEDURE — 85025 COMPLETE CBC W/AUTO DIFF WBC: CPT

## 2024-12-10 PROCEDURE — 86140 C-REACTIVE PROTEIN: CPT

## 2024-12-10 PROCEDURE — 85652 RBC SED RATE AUTOMATED: CPT

## 2024-12-17 NOTE — ASSESSMENT & PLAN NOTE
+++ RF; +CORBY 1: 60.  Episodic flares in joints which include hips, shoulders, knees since July 2018.  Prior methotrexate 1/2019- stopped 6/2024 due to starting Eliquis.  Intolerant Plaquenil 7/24  **Current Rx none     RA remains in remission off DMARD. swollen joint count 0.  Tender joint count 0  She was intolerant to Plaquenil in the interim due to a rash and feeling ill with it.     She continues to go to the gym regularly.  She will come off Plavix March 2024  -Recent labs reviewed today 12/18 are stable.  Inflammatory markers are normal  -I will have her follow-up in 3 months.  She does not feel the need to resume methotrexate.  We will address this at every visit.

## 2024-12-17 NOTE — ASSESSMENT & PLAN NOTE
She is stable.  Diclofenac very effective for her OA  Some tenderness left second toe with hammertoe due to OA.    Use diclofenac gel on this too  -avoid oral NSAIDs with Eliquis use.

## 2024-12-17 NOTE — ASSESSMENT & PLAN NOTE
Found June 2024 during Wellness Check.  She is following with Dr. Montague.   S/p cardioversion with Dr. Chau and Watchman procedure  She is now on Plavix and metoprolol.

## 2024-12-19 ENCOUNTER — OFFICE VISIT (OUTPATIENT)
Age: 79
End: 2024-12-19
Payer: MEDICARE

## 2024-12-19 VITALS
HEIGHT: 66 IN | DIASTOLIC BLOOD PRESSURE: 80 MMHG | BODY MASS INDEX: 21.52 KG/M2 | WEIGHT: 133.9 LBS | TEMPERATURE: 98.1 F | HEART RATE: 80 BPM | SYSTOLIC BLOOD PRESSURE: 140 MMHG

## 2024-12-19 DIAGNOSIS — I48.19 PERSISTENT ATRIAL FIBRILLATION: ICD-10-CM

## 2024-12-19 DIAGNOSIS — M15.9 GENERALIZED OSTEOARTHRITIS: Chronic | ICD-10-CM

## 2024-12-19 DIAGNOSIS — Z79.899 HIGH RISK MEDICATION USE: ICD-10-CM

## 2024-12-19 DIAGNOSIS — R76.8 ANA POSITIVE: ICD-10-CM

## 2024-12-19 DIAGNOSIS — M05.79 RHEUMATOID ARTHRITIS WITH RHEUMATOID FACTOR OF MULTIPLE SITES WITHOUT ORGAN OR SYSTEMS INVOLVEMENT: Primary | ICD-10-CM

## 2024-12-19 DIAGNOSIS — Z87.898 HISTORY OF ANGIOEDEMA: ICD-10-CM

## 2024-12-19 DIAGNOSIS — Z91.89 AT RISK FOR OSTEOPOROSIS: ICD-10-CM

## 2024-12-19 DIAGNOSIS — I73.00 RAYNAUD'S PHENOMENON WITHOUT GANGRENE: Chronic | ICD-10-CM

## 2024-12-19 NOTE — PROGRESS NOTES
Office Visit       Date: 12/19/2024   Patient Name: Roxie Bruce  MRN: [id number]  YOB: 1945    Referring Physician: No ref. provider found     Chief Complaint:   Chief Complaint   Patient presents with    Follow-up    Rheumatoid Arthritis    Osteoarthritis       History of Present Illness: Roxie Bruce is a 79 y.o. female who is here today for follow-up of rheumatoid arthritis.  Today she reports feeling the same.  She rates her pain 1 out of 10 and has 2 minutes of morning stiffness.  She is currently off of DMARDs and biologic therapy.      Subjective   Review of Systems:  Positive for postnasal drip, sneezing, blurred vision once with vertigo, indigestion, urinary frequency, urinary urgency, urinary incontinence, neck pain and stiffness, easy bleeding bruising, environmental allergies, memory problems.  All other review of symptoms are negative.    Past Medical History:   Past Medical History:   Diagnosis Date    Abnormal ECG 2024    Angioedema 05/20/2024    Angioedema of lips 10/2018    Arthritis ? Years ago    Osteoarthritis    Atrial fibrillation     Cataract ?    Surgery on both eyes    Colon polyp ?    Removed during 2 colonoscopies    Diverticulosis ?    Glaucoma ?    Being watched by Dr. Grant Hill    Heart murmur ?    Mitro valve prolapse    Heart valve disease     High risk medication use 05/20/2024    Hyperlipidemia ?    HDL is high    Hypertension     Mitral valve prolapse ? 1976    Osteoarthritis     Positive CORBY (antinuclear antibody)     Rheumatoid arthritis     Positive rheumatoid factor 40\       Past Surgical History:   Past Surgical History:   Procedure Laterality Date    ATRIAL APPENDAGE EXCLUSION LEFT WITH TRANSESOPHAGEAL ECHOCARDIOGRAM N/A 09/10/2024    Procedure: Atrial Appendage Occlusion;  Surgeon: Neri Bernstein DO;  Location: [facility] INVASIVE LOCATION;  Service: Cardiology;  Laterality: N/A;    BREAST BIOPSY Left     x2 20-30 years ago      CARDIAC SURGERY      Watchman Procedure    CARDIOVERSION      COLONOSCOPY  04/20/2016    Most recent one.    EYE SURGERY      Cataract    HYSTERECTOMY      OOPHORECTOMY         Family History:   Family History   Problem Relation Age of Onset    Osteoarthritis Mother     Hyperlipidemia Mother     Arthritis Mother         ? professionally diagnosed    Prostate cancer Father     Arthritis Father         ? professionally diagnosed    Atrial fibrillation Sister     Breast cancer Other         age unknown    Hyperlipidemia Maternal Grandfather     Diabetes Maternal Grandmother     Cancer Paternal Grandfather     Cancer Paternal Grandmother     Diabetes Maternal Aunt     Endometrial cancer Neg Hx     Ovarian cancer Neg Hx        Social History:   Social History     Socioeconomic History    Marital status:    Tobacco Use    Smoking status: Never     Passive exposure: Past    Smokeless tobacco: Never   Vaping Use    Vaping status: Never Used    Passive vaping exposure: Yes   Substance and Sexual Activity    Alcohol use: Yes     Alcohol/week: 1.0 standard drink of alcohol     Types: 1 Glasses of wine per week     Comment: rarely    Drug use: No    Sexual activity: Not Currently     Partners: Male       Medications:   Current Outpatient Medications:     aspirin 81 MG chewable tablet, Chew 1 tablet Daily., Disp: , Rfl:     azelastine (ASTELIN) 0.1 % nasal spray, 2 sprays into the nostril(s) as directed by provider 2 (Two) Times a Day. Use in each nostril as directed, Disp: 30 mL, Rfl: 12    cholecalciferol (VITAMIN D3) 1000 UNITS tablet, Take 2 tablets by mouth Daily., Disp: , Rfl:     clopidogrel (PLAVIX) 75 MG tablet, Take 1 tablet by mouth Daily for 143 days., Disp: 143 tablet, Rfl: 0    Diclofenac Sodium (VOLTAREN) 1 % gel gel, Apply 4 g topically to the appropriate area as directed Take As Directed. Apply 2-4 grams by topical route 3-4 times every day to the affected area(s) PRN, Disp: 100 g, Rfl: 3    estradiol  "(CLIMARA) 0.05 MG/24HR patch, Place 1 patch on the skin as directed by provider 1 (One) Time Per Week., Disp: 12 each, Rfl: 4    FOLIC ACID PO, Take 1 tablet/day by mouth Daily., Disp: , Rfl:     metoprolol tartrate (LOPRESSOR) 25 MG tablet, Take 0.5 tablets by mouth 2 (Two) Times a Day., Disp: 90 tablet, Rfl: 3    Probiotic Product (PROBIOTIC ADVANCED PO), Take 1 tablet by mouth Daily., Disp: , Rfl:     Allergies:   Allergies   Allergen Reactions    Lisinopril Angioedema    Hydroxychloroquine Nausea And Vomiting, Anxiety and Confusion       I have reviewed and updated the patient's chief complaint, history of present illness, review of systems, past medical history, surgical history, family history, social history, medications and allergy list as appropriate.     Objective    Vital Signs:   Vitals:    12/19/24 1037   BP: 140/80   Pulse: 80   Temp: 98.1 °F (36.7 °C)   Weight: 60.7 kg (133 lb 14.4 oz)   Height: 166.4 cm (65.5\")   PainSc:   1     Body mass index is 21.94 kg/m².   BMI is within normal parameters. No other follow-up for BMI required.       Physical Exam:  Physical Exam  Vitals reviewed.   Constitutional:       Appearance: Normal appearance.   HENT:      Head: Normocephalic and atraumatic.      Mouth/Throat:      Mouth: Mucous membranes are moist.   Eyes:      Conjunctiva/sclera: Conjunctivae normal.   Cardiovascular:      Rate and Rhythm: Normal rate and regular rhythm.      Pulses: Normal pulses.      Heart sounds: Normal heart sounds.   Pulmonary:      Effort: Pulmonary effort is normal.      Breath sounds: Normal breath sounds.   Musculoskeletal:         General: Normal range of motion.      Cervical back: Normal range of motion and neck supple.      Comments: No synovitis  OA changes of fingers bilaterally with squaring of bilateral CMC  Tender left foot   Skin:     General: Skin is warm and dry.   Neurological:      General: No focal deficit present.      Mental Status: She is alert and oriented to " person, place, and time. Mental status is at baseline.   Psychiatric:         Mood and Affect: Mood normal.         Behavior: Behavior normal.         Thought Content: Thought content normal.         Judgment: Judgment normal.          Results Review:   Imaging Results (Last 24 Hours)       ** No results found for the last 24 hours. **            Procedures    Assessment / Plan    Assessment/Plan:   Diagnoses and all orders for this visit:    1. Rheumatoid arthritis with rheumatoid factor of multiple sites without organ or systems involvement (Primary)  Assessment & Plan:  +++ RF; +CORBY 1: 60.  Episodic flares in joints which include hips, shoulders, knees since July 2018.  Prior methotrexate 1/2019- stopped 6/2024 due to starting Eliquis.  Intolerant Plaquenil 7/24  **Current Rx none     RA remains in remission off DMARD. swollen joint count 0.  Tender joint count 0  She was intolerant to Plaquenil in the interim due to a rash and feeling ill with it.     She continues to go to the gym regularly.  She will come off Plavix March 2024  -Recent labs reviewed today 12/18 are stable.  Inflammatory markers are normal  -I will have her follow-up in 3 months.  She does not feel the need to resume methotrexate.  We will address this at every visit.         Orders:  -     CBC With Manual Differential; Future  -     Comprehensive Metabolic Panel; Future  -     C-reactive Protein; Future  -     Sedimentation Rate; Future    2. Generalized osteoarthritis  Assessment & Plan:  She is stable.  Diclofenac very effective for her OA  Some tenderness left second toe with hammertoe due to OA.    Use diclofenac gel on this too  -avoid oral NSAIDs with Eliquis use.         3. Raynaud's phenomenon without gangrene  Assessment & Plan:  Longstanding history of Raynaud's.  She has had no digital ulcerations.  Avoid cold exposure.     Encouraged to keep extremities warm.  Recommend that she avoid nicotine and caffeine as these can make the  Raynaud's symptoms worse.  No evidence of a secondary connective tissue disease.         4. High risk medication use  Assessment & Plan:  No longer on methotrexate or other DMARDs.  She is doing well clinically today.      5. At risk for osteoporosis  Assessment & Plan:  DEXA was normal 8/25/2021 at [facility]. Recheck in 5 years.       6. CORBY positive  Assessment & Plan:  CORBY was positive at 1: 160.     There is no evidence of lupus or connective tissue disease.  This could be related to either age or medication or rheumatoid arthritis.         7. History of angioedema  Assessment & Plan:  October 2018 lip/feb 2019;  dermatology dr fofana  ? acei induced    One episode October 2018.  Resolved without recurrence.    ? related to her prior ACE inhibitor use.    Follow-up with dermatology or allergy if recurrence.  Avoid further ACE inhibitors      8. Persistent atrial fibrillation  Assessment & Plan:  Found June 2024 during Wellness Check.  She is following with Dr. Montague.   S/p cardioversion with Dr. Chau and Watchman procedure  She is now on Plavix and metoprolol.          Follow Up:   Return in about 3 months (around 3/19/2025) for Dr. Carrasco.        BROOKS Johnson  [facility]

## 2025-01-20 ENCOUNTER — OFFICE VISIT (OUTPATIENT)
Dept: FAMILY MEDICINE CLINIC | Facility: CLINIC | Age: 80
End: 2025-01-20
Payer: MEDICARE

## 2025-01-20 VITALS
RESPIRATION RATE: 14 BRPM | DIASTOLIC BLOOD PRESSURE: 84 MMHG | WEIGHT: 133 LBS | BODY MASS INDEX: 21.38 KG/M2 | HEIGHT: 66 IN | OXYGEN SATURATION: 100 % | SYSTOLIC BLOOD PRESSURE: 140 MMHG | HEART RATE: 72 BPM

## 2025-01-20 DIAGNOSIS — R42 VERTIGO: Primary | ICD-10-CM

## 2025-01-20 PROCEDURE — 3077F SYST BP >= 140 MM HG: CPT | Performed by: PHYSICIAN ASSISTANT

## 2025-01-20 PROCEDURE — 99213 OFFICE O/P EST LOW 20 MIN: CPT | Performed by: PHYSICIAN ASSISTANT

## 2025-01-20 PROCEDURE — 1125F AMNT PAIN NOTED PAIN PRSNT: CPT | Performed by: PHYSICIAN ASSISTANT

## 2025-01-20 PROCEDURE — G2211 COMPLEX E/M VISIT ADD ON: HCPCS | Performed by: PHYSICIAN ASSISTANT

## 2025-01-20 PROCEDURE — 3079F DIAST BP 80-89 MM HG: CPT | Performed by: PHYSICIAN ASSISTANT

## 2025-01-20 NOTE — PROGRESS NOTES
Subjective   Roxie Bruce is a 79 y.o. female  Vertigo      History of Present Illness  History of Present Illness  The patient is a 79-year-old female who presents for evaluation of vertigo.    She experienced an episode of vertigo on 01/14/2025, accompanied by tinnitus and imbalance. She is uncertain if she took Antivert or not, but her symptoms improved as the day progressed. Her first encounter with vertigo was on 12/06/2024, during which she fell into a chair at night. She reports no current vertigo but last experienced it on 01/04/2025. She does not experience unsteadiness unless she has vertigo. She is cautious about rapid movements and uses support while exercising on the treadmill and descending stairs. She continues to experience head sensations. She recalls the onset of these issues in 2001, characterized by a sensation of fluid accumulation in her head. Despite these symptoms, she maintains an active lifestyle, attending fitness sessions 3 to 4 times a week and walking a mile on the treadmill at a speed of 3.7, although with the aid of handrails for balance. She was previously treated with cefdinir by Dr. Starkey in 2001 and subsequently started on azelastine during a follow-up visit in the same year, a medication she continues to use due to its efficacy. She also reported swollen glands at that time.    MEDICATIONS  Current: azelastine  Past: cefdinir    The following portions of the patient's history were reviewed and updated as appropriate: allergies, current medications, past social history and problem list    Review of Systems   Constitutional:  Negative for fatigue and unexpected weight change.   HENT:  Positive for congestion, ear pain, hearing loss and tinnitus.    Respiratory:  Negative for cough, chest tightness and shortness of breath.    Cardiovascular:  Negative for chest pain, palpitations and leg swelling.   Gastrointestinal:  Negative for nausea.   Musculoskeletal:  Positive for  neck pain.   Skin:  Negative for color change and rash.   Neurological:  Positive for dizziness. Negative for syncope, weakness and headaches.       Objective     Vitals:    01/20/25 1041   BP: 140/84   Pulse: 72   Resp: 14   SpO2: 100%       Physical Exam  Vitals and nursing note reviewed.   Constitutional:       General: She is not in acute distress.     Appearance: Normal appearance. She is well-developed. She is not ill-appearing, toxic-appearing or diaphoretic.   Neck:      Vascular: No carotid bruit or JVD.   Cardiovascular:      Rate and Rhythm: Normal rate and regular rhythm.      Pulses: Normal pulses.      Heart sounds: Normal heart sounds. No murmur heard.  Pulmonary:      Effort: Pulmonary effort is normal. No respiratory distress.      Breath sounds: Normal breath sounds.   Abdominal:      Palpations: Abdomen is soft.      Tenderness: There is no abdominal tenderness.   Skin:     General: Skin is warm and dry.   Neurological:      Mental Status: She is alert.       Physical Exam      Assessment & Plan   Assessment & Plan  1. Vertigo.  She reports experiencing vertigo with the most recent episode occurring on January 4. She also mentions a previous severe episode on December 6, which included a fall. She continues to experience tinnitus and occasional head sensations. There is a little fluid pressure in her ear. A low dose of meclizine was discussed as a potential treatment to help dry up the inner ear and improve balance, but she prefers not to take medication at this time. A referral for vestibular physical therapy will be made to address her inner ear issues and improve her balance. She is advised to continue her exercise regimen to maintain strength. If symptoms persist, further diagnostic testing will be considered.    Diagnoses and all orders for this visit:    1. Vertigo (Primary)  -     Ambulatory Referral to Physical Therapy for Evaluation & Treatment       I spent 15 minutes in patient care:  Reviewing records prior to the visit, examining the patient, entering orders and documentation    Part of this note may be an electronic transcription/translation of spoken language to printed text using the Dragon Dictation System.     Patient or patient representative verbalized consent for the use of Ambient Listening during the visit with  ROMMEL Hanna for chart documentation. 1/20/2025  10:56 EST  Answers submitted by the patient for this visit:  Primary Reason for Visit (Submitted on 1/18/2025)  What is the primary reason for your visit?: Ear Pain  Ear Pain Questionnaire (Submitted on 1/18/2025)  Chief Complaint: Ear pain

## 2025-02-05 ENCOUNTER — OFFICE VISIT (OUTPATIENT)
Dept: FAMILY MEDICINE CLINIC | Facility: CLINIC | Age: 80
End: 2025-02-05
Payer: MEDICARE

## 2025-02-05 VITALS
HEIGHT: 66 IN | DIASTOLIC BLOOD PRESSURE: 92 MMHG | BODY MASS INDEX: 21.24 KG/M2 | OXYGEN SATURATION: 99 % | WEIGHT: 132.2 LBS | SYSTOLIC BLOOD PRESSURE: 148 MMHG | TEMPERATURE: 97.8 F | HEART RATE: 72 BPM

## 2025-02-05 DIAGNOSIS — R30.0 BURNING WITH URINATION: Primary | ICD-10-CM

## 2025-02-05 DIAGNOSIS — R30.0 BURNING WITH URINATION: ICD-10-CM

## 2025-02-05 LAB
BILIRUB BLD-MCNC: NEGATIVE MG/DL
CLARITY, POC: CLEAR
COLOR UR: YELLOW
EXPIRATION DATE: ABNORMAL
GLUCOSE UR STRIP-MCNC: NEGATIVE MG/DL
KETONES UR QL: NEGATIVE
LEUKOCYTE EST, POC: NEGATIVE
Lab: ABNORMAL
NITRITE UR-MCNC: NEGATIVE MG/ML
PH UR: 6 [PH] (ref 5–8)
PROT UR STRIP-MCNC: NEGATIVE MG/DL
RBC # UR STRIP: NEGATIVE /UL
SP GR UR: 1.01 (ref 1–1.03)
UROBILINOGEN UR QL: NORMAL

## 2025-02-05 PROCEDURE — 3080F DIAST BP >= 90 MM HG: CPT | Performed by: FAMILY MEDICINE

## 2025-02-05 PROCEDURE — 87086 URINE CULTURE/COLONY COUNT: CPT | Performed by: PHYSICIAN ASSISTANT

## 2025-02-05 PROCEDURE — 99213 OFFICE O/P EST LOW 20 MIN: CPT | Performed by: FAMILY MEDICINE

## 2025-02-05 PROCEDURE — 81003 URINALYSIS AUTO W/O SCOPE: CPT | Performed by: FAMILY MEDICINE

## 2025-02-05 PROCEDURE — 1159F MED LIST DOCD IN RCRD: CPT | Performed by: FAMILY MEDICINE

## 2025-02-05 PROCEDURE — 1160F RVW MEDS BY RX/DR IN RCRD: CPT | Performed by: FAMILY MEDICINE

## 2025-02-05 PROCEDURE — 1125F AMNT PAIN NOTED PAIN PRSNT: CPT | Performed by: FAMILY MEDICINE

## 2025-02-05 PROCEDURE — 3077F SYST BP >= 140 MM HG: CPT | Performed by: FAMILY MEDICINE

## 2025-02-05 NOTE — PROGRESS NOTES
Subjective   Roxie Bruce is a 79 y.o. female  Dysuria (Intermittent burning with urination x2 days but doing better after drinking cranberry juice)      History of Present Illness  History of Present Illness  The patient is a 79-year-old female who presents today for evaluation of discomfort upon urination.    She has been experiencing mild discomfort during urination, which she has been managing with cranberry juice intake for an extended period. She reports a significant episode of discomfort on Sunday night, which was alleviated by the application of hydrogen peroxide, although with some residual stinging. She also reports a protrusion of the urethra, as observed by her . She does not experience any difficulty in urination and maintains adequate hydration. She reports no unusual odor in her urine but notes occasional orange discoloration, which typically resolves with increased hydration. Her urine is usually clear in the morning post-breakfast but may darken in the afternoon. She has been applying hydrogen peroxide to the area using a cotton ball for the past 1 to 2 years, a practice that appears to be effective. She is currently on estrogen therapy and uses a panty liner or pad consistently, without any associated irritation. She occasionally experiences itching but does not believe it to be indicative of a yeast infection. She has been manually repositioning the urethra and applying Vaseline to the area.    MEDICATIONS  Current: Estrogen patch    The following portions of the patient's history were reviewed and updated as appropriate: allergies, current medications, past social history and problem list    Review of Systems   Genitourinary:  Positive for dysuria. Negative for hematuria.       Objective     Vitals:    02/05/25 1139   BP: 148/92   Pulse: 72   Temp: 97.8 °F (36.6 °C)   SpO2: 99%       Physical Exam  Vitals and nursing note reviewed.   Constitutional:       General: She is not in  acute distress.     Appearance: Normal appearance. She is well-developed. She is not ill-appearing, toxic-appearing or diaphoretic.   Abdominal:      General: There is no distension.      Palpations: Abdomen is soft. There is no mass.      Tenderness: There is no abdominal tenderness. There is no guarding or rebound.   Musculoskeletal:         General: No swelling.   Skin:     General: Skin is warm and dry.      Coloration: Skin is not pale.   Neurological:      Mental Status: She is alert.       Physical Exam      Assessment & Plan   Assessment & Plan  1. Dysuria.  Her urine sample does not indicate any significant findings, suggesting that her hydration status is optimal. The rapid test did not reveal any robust infection. She reports feeling better since starting cranberry juice and using hydrogen peroxide topically. She is currently on estrogen therapy, which helps maintain the thickness of the urethral tissue. The protrusion of the urethra is likely due to age-related tissue thinning, leading to a more pronounced appearance and potential irritation from clothing, pads, or urine. Her urine color changes could be attributed to medication use. She is advised to continue her current regimen of cranberry juice and water intake. She is also encouraged to persist with the application of hydrogen peroxide and Vaseline to the affected area. A urine culture will be ordered to rule out any low-grade infection. The results will be communicated via [employer], and if necessary, a prescription for antibiotics will be provided.    Diagnoses and all orders for this visit:    1. Burning with urination (Primary)  -     POC Urinalysis Dipstick, Automated  -     Urine Culture - Urine, Urine, Clean Catch; Future         Patient or patient representative verbalized consent for the use of Ambient Listening during the visit with  Mariangel Holt PA-C for chart documentation. 2/5/2025  12:32 EST

## 2025-02-07 LAB — BACTERIA SPEC AEROBE CULT: NO GROWTH

## 2025-03-14 ENCOUNTER — TELEMEDICINE (OUTPATIENT)
Dept: CARDIOLOGY | Facility: HOSPITAL | Age: 80
End: 2025-03-14
Payer: MEDICARE

## 2025-03-14 VITALS — DIASTOLIC BLOOD PRESSURE: 104 MMHG | SYSTOLIC BLOOD PRESSURE: 155 MMHG

## 2025-03-14 DIAGNOSIS — I10 ESSENTIAL HYPERTENSION: ICD-10-CM

## 2025-03-14 DIAGNOSIS — I48.11 LONGSTANDING PERSISTENT ATRIAL FIBRILLATION: Primary | ICD-10-CM

## 2025-03-14 DIAGNOSIS — Z95.818 PRESENCE OF WATCHMAN LEFT ATRIAL APPENDAGE CLOSURE DEVICE: ICD-10-CM

## 2025-03-14 RX ORDER — AMLODIPINE BESYLATE 2.5 MG/1
2.5 TABLET ORAL DAILY
Qty: 90 TABLET | Refills: 1 | Status: SHIPPED | OUTPATIENT
Start: 2025-03-14

## 2025-03-14 NOTE — PROGRESS NOTES
Mode of visit: Video  Location of patient:[facility]   Location of provider: [facility]  You have chosen to receive care through a telehealth visit.   Do you consent to the use video/audio connection for your medical care today?: Yes  This visit included audio and video interaction. No technical issues occurred during the visit.       Chief Complaint  Atrial Fibrillation (Watchman follow-up)    [facility]  [facility]  Telemedicine note    This was a video enabled telemedicine encounter.    Subjective    History of Present Illness {CC  Problem List  Visit  Diagnosis   Encounters  Notes  Medications  Labs  Result Review Imaging  Media :23}     Roxie Bruce, 79 y.o. female presents to [facility] telemedicine visit for Atrial Fibrillation (Watchman follow-up)    Cardiac PMH:   Afib  New onset of unknown duration  Chadvasc=4. Eliquis 5mg bID  Watchman 35 mm LAAO device 8/22/24  ALEXANDER, 10/18/24: Watchman well seated and well compressed with no catracho-prosthetic flow or device related thrombus seen. EF 51-55%   Valvular heart disease  Echocardiogram July 2024 EF 51% aortic valve sclerosis without stenosis mild mitral annular calcification with mild mitral valve prolapse of the anterior leaflet mild MR  HTN: Intolerance to Zestril due to Angioedema.   RA/OA   HLD    6 Month Watchman Follow-up Note    Patient presents today for follow-up s/p watchman placement by Dr. Bernstein on 9/10/24.  Patient has completed follow-up ALEXANDER that revealed a well-positioned watchman device with no significant catracho-prosthetic leak present.  Patient has transitioned to aspirin monotherapy as directed at EP appointment.  Reports SBP has been over 140 quite consistently recently.       Objective     Vital Signs:   Vitals:    03/14/25 1336   BP: (!) 155/104     There is no height or weight on file to calculate BMI.  Physical Exam  Vitals and nursing note reviewed.   Pulmonary:       Effort: Pulmonary effort is normal. No respiratory distress.   Neurological:      Mental Status: She is alert.   Psychiatric:         Mood and Affect: Mood normal.         Behavior: Behavior normal.         Thought Content: Thought content normal.       Data Reviewed:{ Labs  Result Review  Imaging  Med Tab  Media :23}     Adult Transesophageal Echo 3D (ALEXANDER) W/ Cont If Necessary Per Protocol (10/18/2024 10:11)  EP/CRM Study (09/10/2024 12:19)      Assessment and Plan {CC Problem List  Visit Diagnosis  ROS  Review (Popup)  Health Maintenance  Quality  BestPractice  Medications  SmartSets  SnapShot Encounters  Media :23}     This visit has been scheduled as a video visit.     1. Longstanding persistent atrial fibrillation/ Presence of Watchman left atrial appendage closure device  -s/p watchman placement by Dr. Bernstein on 9/10/24.    -follow-up ALEXANDER that revealed a well-positioned watchman device with no significant catracho-prosthetic leak present.   - Patient has transitioned to aspirin monotherapy as directed at last EP appointment.     -Discussed 1 year watchman follow-up with planned ALEXANDER around 1 year anniversary date of watchman implant.    -1 year ALEXANDRE ordered at last EP appointment    2. Essential hypertension  -BP elevated this morning and recent monitoring  -Discussed options and she is agreeable to start low dose amlodipine for further BP control prior to upcoming PCP appointment  - amLODIPine (NORVASC) 2.5 MG tablet; Take 1 tablet by mouth Daily.  Dispense: 90 tablet; Refill: 1  -Continue exercise, heart healthy diet  -Continue BP monitoring with BP log prior to PCP follow-up      Follow Up {Instructions Charge Capture  Follow-up Communications :23}   Return if symptoms worsen or fail to improve.      Patient was given instructions and counseling regarding her condition or for health maintenance advice. Please see specific information pulled into the AVS if appropriate. Patient was instructed to  call the Heart and Val[facility] Center with any questions, concerns, or worsening symptoms.    *Please note that portions of this note were completed with a voice recognition program. Efforts were made to edit the dictations, but occasionally words are mistranscribed.

## 2025-03-19 ENCOUNTER — TRANSCRIBE ORDERS (OUTPATIENT)
Dept: ADMINISTRATIVE | Facility: HOSPITAL | Age: 80
End: 2025-03-19
Payer: MEDICARE

## 2025-03-19 DIAGNOSIS — Z12.31 SCREENING MAMMOGRAM FOR BREAST CANCER: Primary | ICD-10-CM

## 2025-04-26 LAB
NCCN CRITERIA FLAG: NORMAL
TYRER CUZICK SCORE: 2.1

## 2025-05-01 ENCOUNTER — HOSPITAL ENCOUNTER (OUTPATIENT)
Dept: MAMMOGRAPHY | Facility: HOSPITAL | Age: 80
Discharge: HOME | End: 2025-05-01
Admitting: FAMILY MEDICINE
Payer: MEDICARE

## 2025-05-01 DIAGNOSIS — Z12.31 SCREENING MAMMOGRAM FOR BREAST CANCER: ICD-10-CM

## 2025-05-01 PROCEDURE — 77063 BREAST TOMOSYNTHESIS BI: CPT

## 2025-05-01 PROCEDURE — 77067 SCR MAMMO BI INCL CAD: CPT

## 2025-05-15 ENCOUNTER — HOSPITAL ENCOUNTER (OUTPATIENT)
Dept: MAMMOGRAPHY | Facility: HOSPITAL | Age: 80
Discharge: HOME | End: 2025-05-15
Admitting: RADIOLOGY
Payer: MEDICARE

## 2025-05-15 ENCOUNTER — TRANSCRIBE ORDERS (OUTPATIENT)
Dept: ADMINISTRATIVE | Facility: HOSPITAL | Age: 80
End: 2025-05-15
Payer: MEDICARE

## 2025-05-15 DIAGNOSIS — R92.8 ABNORMAL MAMMOGRAM: ICD-10-CM

## 2025-05-15 DIAGNOSIS — R92.8 ABNORMAL MAMMOGRAM: Primary | ICD-10-CM

## 2025-05-15 PROCEDURE — 77066 DX MAMMO INCL CAD BI: CPT

## 2025-05-15 PROCEDURE — G0279 TOMOSYNTHESIS, MAMMO: HCPCS

## 2025-06-09 ENCOUNTER — LAB (OUTPATIENT)
Dept: LAB | Facility: HOSPITAL | Age: 80
End: 2025-06-09
Payer: MEDICARE

## 2025-06-09 ENCOUNTER — OFFICE VISIT (OUTPATIENT)
Dept: FAMILY MEDICINE CLINIC | Facility: CLINIC | Age: 80
End: 2025-06-09
Payer: MEDICARE

## 2025-06-09 VITALS
TEMPERATURE: 98.5 F | HEART RATE: 63 BPM | HEIGHT: 66 IN | OXYGEN SATURATION: 100 % | DIASTOLIC BLOOD PRESSURE: 88 MMHG | RESPIRATION RATE: 16 BRPM | BODY MASS INDEX: 20.57 KG/M2 | SYSTOLIC BLOOD PRESSURE: 150 MMHG | WEIGHT: 128 LBS

## 2025-06-09 DIAGNOSIS — I10 ESSENTIAL HYPERTENSION: ICD-10-CM

## 2025-06-09 DIAGNOSIS — Z00.00 ANNUAL PHYSICAL EXAM: ICD-10-CM

## 2025-06-09 DIAGNOSIS — N95.1 MENOPAUSAL SYNDROME: ICD-10-CM

## 2025-06-09 DIAGNOSIS — J30.2 SEASONAL ALLERGIC RHINITIS, UNSPECIFIED TRIGGER: ICD-10-CM

## 2025-06-09 DIAGNOSIS — Z00.00 MEDICARE ANNUAL WELLNESS VISIT, SUBSEQUENT: ICD-10-CM

## 2025-06-09 DIAGNOSIS — M15.9 GENERALIZED OSTEOARTHRITIS: ICD-10-CM

## 2025-06-09 DIAGNOSIS — Z00.00 ANNUAL PHYSICAL EXAM: Primary | ICD-10-CM

## 2025-06-09 DIAGNOSIS — M05.79 RHEUMATOID ARTHRITIS WITH RHEUMATOID FACTOR OF MULTIPLE SITES WITHOUT ORGAN OR SYSTEMS INVOLVEMENT: ICD-10-CM

## 2025-06-09 DIAGNOSIS — Z12.11 COLON CANCER SCREENING: ICD-10-CM

## 2025-06-09 DIAGNOSIS — Z51.81 MEDICATION MONITORING ENCOUNTER: ICD-10-CM

## 2025-06-09 DIAGNOSIS — I48.91 ATRIAL FIBRILLATION, UNSPECIFIED TYPE: ICD-10-CM

## 2025-06-09 DIAGNOSIS — N39.46 MIXED STRESS AND URGE URINARY INCONTINENCE: ICD-10-CM

## 2025-06-09 LAB
ALBUMIN SERPL-MCNC: 4.3 G/DL (ref 3.5–5.2)
ALBUMIN/GLOB SERPL: 1.7 G/DL
ALP SERPL-CCNC: 81 U/L (ref 39–117)
ALT SERPL W P-5'-P-CCNC: 14 U/L (ref 1–33)
ANION GAP SERPL CALCULATED.3IONS-SCNC: 12 MMOL/L (ref 5–15)
AST SERPL-CCNC: 25 U/L (ref 1–32)
BASOPHILS # BLD MANUAL: 0.07 10*3/MM3 (ref 0–0.2)
BASOPHILS NFR BLD MANUAL: 1 % (ref 0–1.5)
BILIRUB SERPL-MCNC: 0.8 MG/DL (ref 0–1.2)
BUN SERPL-MCNC: 12 MG/DL (ref 8–23)
BUN/CREAT SERPL: 15.6 (ref 7–25)
CALCIUM SPEC-SCNC: 9.3 MG/DL (ref 8.6–10.5)
CHLORIDE SERPL-SCNC: 98 MMOL/L (ref 98–107)
CHOLEST SERPL-MCNC: 200 MG/DL (ref 0–200)
CO2 SERPL-SCNC: 26 MMOL/L (ref 22–29)
CREAT SERPL-MCNC: 0.77 MG/DL (ref 0.57–1)
CRP SERPL-MCNC: <0.3 MG/DL (ref 0–0.5)
DEPRECATED RDW RBC AUTO: 39.9 FL (ref 37–54)
EGFRCR SERPLBLD CKD-EPI 2021: 79.1 ML/MIN/1.73
EOSINOPHIL # BLD MANUAL: 0.07 10*3/MM3 (ref 0–0.4)
EOSINOPHIL NFR BLD MANUAL: 1 % (ref 0.3–6.2)
ERYTHROCYTE [DISTWIDTH] IN BLOOD BY AUTOMATED COUNT: 12 % (ref 12.3–15.4)
ERYTHROCYTE [SEDIMENTATION RATE] IN BLOOD: 8 MM/HR (ref 0–30)
GLOBULIN UR ELPH-MCNC: 2.6 GM/DL
GLUCOSE SERPL-MCNC: 99 MG/DL (ref 65–99)
HCT VFR BLD AUTO: 41.9 % (ref 34–46.6)
HDLC SERPL-MCNC: 99 MG/DL (ref 40–60)
HGB BLD-MCNC: 14 G/DL (ref 12–15.9)
LDLC SERPL CALC-MCNC: 90 MG/DL (ref 0–100)
LDLC/HDLC SERPL: 0.91 {RATIO}
LYMPHOCYTES # BLD MANUAL: 1.36 10*3/MM3 (ref 0.7–3.1)
LYMPHOCYTES NFR BLD MANUAL: 5 % (ref 5–12)
MCH RBC QN AUTO: 30.5 PG (ref 26.6–33)
MCHC RBC AUTO-ENTMCNC: 33.4 G/DL (ref 31.5–35.7)
MCV RBC AUTO: 91.3 FL (ref 79–97)
MONOCYTES # BLD: 0.36 10*3/MM3 (ref 0.1–0.9)
NEUTROPHILS # BLD AUTO: 5.31 10*3/MM3 (ref 1.7–7)
NEUTROPHILS NFR BLD MANUAL: 74 % (ref 42.7–76)
PLAT MORPH BLD: NORMAL
PLATELET # BLD AUTO: 275 10*3/MM3 (ref 140–450)
PMV BLD AUTO: 10.7 FL (ref 6–12)
POTASSIUM SERPL-SCNC: 4.4 MMOL/L (ref 3.5–5.2)
PROT SERPL-MCNC: 6.9 G/DL (ref 6–8.5)
RBC # BLD AUTO: 4.59 10*6/MM3 (ref 3.77–5.28)
RBC MORPH BLD: NORMAL
SODIUM SERPL-SCNC: 136 MMOL/L (ref 136–145)
T4 FREE SERPL-MCNC: 1.06 NG/DL (ref 0.92–1.68)
TRIGL SERPL-MCNC: 57 MG/DL (ref 0–150)
TSH SERPL DL<=0.05 MIU/L-ACNC: 1.58 UIU/ML (ref 0.27–4.2)
VARIANT LYMPHS NFR BLD MANUAL: 19 % (ref 19.6–45.3)
VLDLC SERPL-MCNC: 11 MG/DL (ref 5–40)
WBC MORPH BLD: NORMAL
WBC NRBC COR # BLD AUTO: 7.17 10*3/MM3 (ref 3.4–10.8)

## 2025-06-09 PROCEDURE — 80053 COMPREHEN METABOLIC PANEL: CPT

## 2025-06-09 PROCEDURE — 36415 COLL VENOUS BLD VENIPUNCTURE: CPT

## 2025-06-09 PROCEDURE — 85652 RBC SED RATE AUTOMATED: CPT

## 2025-06-09 PROCEDURE — 85007 BL SMEAR W/DIFF WBC COUNT: CPT

## 2025-06-09 PROCEDURE — 86140 C-REACTIVE PROTEIN: CPT

## 2025-06-09 PROCEDURE — 84443 ASSAY THYROID STIM HORMONE: CPT

## 2025-06-09 PROCEDURE — 85027 COMPLETE CBC AUTOMATED: CPT

## 2025-06-09 PROCEDURE — 84439 ASSAY OF FREE THYROXINE: CPT

## 2025-06-09 PROCEDURE — 80061 LIPID PANEL: CPT

## 2025-06-09 RX ORDER — ESTRADIOL 0.05 MG/D
1 PATCH TRANSDERMAL WEEKLY
Qty: 12 EACH | Refills: 4 | Status: SHIPPED | OUTPATIENT
Start: 2025-06-09

## 2025-06-09 RX ORDER — AZELASTINE 1 MG/ML
2 SPRAY, METERED NASAL 2 TIMES DAILY
Qty: 30 ML | Refills: 12 | Status: SHIPPED | OUTPATIENT
Start: 2025-06-09

## 2025-06-09 RX ORDER — AMLODIPINE BESYLATE 5 MG/1
5 TABLET ORAL DAILY
Qty: 90 TABLET | Refills: 3 | Status: SHIPPED | OUTPATIENT
Start: 2025-06-09

## 2025-06-12 NOTE — PROGRESS NOTES
The ABCs of the Annual Wellness Visit  Subsequent Medicare Wellness Visit    Chief Complaint   Patient presents with    Medicare Wellness-subsequent      Subjective   History of Present Illness:  Roxie Bruce is a 79 y.o. female who presents for a Subsequent Medicare Wellness Visit.  History of Present Illness      The following portions of the patient's history were reviewed and   updated as appropriate: allergies, current medications, past family history, past medical history, past social history, past surgical history, and problem list.    Compared to one year ago, the patient feels her physical   health is worse.    Compared to one year ago, the patient feels her mental   health is the same.    Recent Hospitalizations:  This patient has had a [facility] admission record on file within the last 365 days.    Current Medical Providers:  Patient Care Team:  Justus Esquivel MD as PCP - General (Family Medicine)  Parker Germain MD as Consulting Physician (Ophthalmology)  Gray Carrasco MD as Consulting Physician (Rheumatology)  Christopher Lopez APRN as Nurse Practitioner (Rheumatology)  Bassam Montague PA as Physician Assistant (Cardiology)  Neri Bernstein DO as Consulting Physician (Cardiology)    Outpatient Medications Prior to Visit   Medication Sig Dispense Refill    aspirin 81 MG chewable tablet Chew 1 tablet Daily.      cholecalciferol (VITAMIN D3) 1000 UNITS tablet Take 2 tablets by mouth Daily.      FOLIC ACID PO Take 1 tablet/day by mouth Daily.      metoprolol tartrate (LOPRESSOR) 25 MG tablet Take 0.5 tablets by mouth 2 (Two) Times a Day. 90 tablet 3    Probiotic Product (PROBIOTIC ADVANCED PO) Take 1 tablet by mouth Daily.      amLODIPine (NORVASC) 2.5 MG tablet Take 1 tablet by mouth Daily. 90 tablet 1    azelastine (ASTELIN) 0.1 % nasal spray 2 sprays into the nostril(s) as directed by provider 2 (Two) Times a Day. Use in each nostril as directed 30 mL 12     "Diclofenac Sodium (VOLTAREN) 1 % gel gel Apply 4 g topically to the appropriate area as directed Take As Directed. Apply 2-4 grams by topical route 3-4 times every day to the affected area(s)  g 3    estradiol (CLIMARA) 0.05 MG/24HR patch Place 1 patch on the skin as directed by provider 1 (One) Time Per Week. 12 each 4     No facility-administered medications prior to visit.       No opioid medication identified on active medication list. I have reviewed chart for other potential  high risk medication/s and harmful drug interactions in the elderly.        Aspirin is on active medication list. Aspirin use is indicated based on review of current medical condition/s. Pros and cons of this therapy have been discussed today. Benefits of this medication outweigh potential harm.  Patient has been encouraged to continue taking this medication.  .      Patient Active Problem List   Diagnosis    Essential hypertension    Menopausal and female climacteric states    Chest pain    Rheumatoid arteritis    Rheumatoid arthritis with rheumatoid factor of multiple sites without organ or systems involvement    High risk medication use    Generalized osteoarthritis    At risk for osteoporosis    CORBY positive    Raynaud phenomenon    Personal history of unspecified circulatory disease    History of angioedema    Atrial fibrillation by electrocardiogram    Noncompliance with medication treatment due to underuse of medication    Longstanding persistent atrial fibrillation    Persistent atrial fibrillation     Advance Care Planning  ACP discussion was declined by the patient. Patient has an advance directive in EMR which is still valid.     Review of Systems      Objective      Vitals:    06/09/25 1029   BP: 150/88   Pulse: 63   Resp: 16   Temp: 98.5 °F (36.9 °C)   SpO2: 100%   Weight: 58.1 kg (128 lb)   Height: 166.4 cm (65.51\")   PainSc: 0-No pain     BMI Readings from Last 1 Encounters:   06/09/25 20.97 kg/m²   BMI is within " normal parameters. No follow-up required.    Does the patient have evidence of cognitive impairment? No    Physical Exam  Physical Exam      Lab Results   Component Value Date    TRIG 57 2025    HDL 99 (H) 2025    LDL 90 2025    VLDL 11 2025     Results             HEALTH RISK ASSESSMENT    Smoking Status:  Social History     Tobacco Use   Smoking Status Never    Passive exposure: Past   Smokeless Tobacco Never     Alcohol Consumption:  Social History     Substance and Sexual Activity   Alcohol Use Yes    Alcohol/week: 1.0 standard drink of alcohol    Types: 1 Glasses of wine per week    Comment: rarely     Fall Risk Screen:    RED Fall Risk Assessment was completed, and patient is at LOW risk for falls.Assessment completed on:2025    Depression Screenin/9/2025    10:49 AM   PHQ-2/PHQ-9 Depression Screening   Little interest or pleasure in doing things Not at all   Feeling down, depressed, or hopeless Not at all       Health Habits and Functional and Cognitive Screenin/2/2025     2:09 PM   Functional & Cognitive Status   Do you have difficulty preparing food and eating? No   Do you have difficulty bathing yourself, getting dressed or grooming yourself? No   Do you have difficulty using the toilet? No   Do you have difficulty moving around from place to place? No   Do you have trouble with steps or getting out of a bed or a chair? No   Current Diet Well Balanced Diet   Dental Exam Up to date   Eye Exam Up to date   Exercise (times per week) 4 times per week   Current Exercises Include Cardiovascular Workout   Do you need help using the phone?  No   Are you deaf or do you have serious difficulty hearing?  No   Do you need help to go to places out of walking distance? No   Do you need help shopping? No   Do you need help preparing meals?  No   Do you need help with housework?  Yes   Do you need help with laundry? No   Do you need help taking your medications? No   Do you  need help managing money? No   Do you ever drive or ride in a car without wearing a seat belt? No   Have you felt unusual stress, anger or loneliness in the last month? No   Who do you live with? Spouse   If you need help, do you have trouble finding someone available to you? No   Have you been bothered in the last four weeks by sexual problems? No   Do you have difficulty concentrating, remembering or making decisions? Yes       Age-appropriate Screening Schedule:  Refer to the list below for future screening recommendations based on patient's age, sex and/or medical conditions. Orders for these recommended tests are listed in the plan section. The patient has been provided with a written plan.    Health Maintenance   Topic Date Due    RSV Vaccine - Adults (1 - 1-dose 75+ series) Never done    COVID-19 Vaccine (8 - 2024-25 season) 06/23/2025 (Originally 4/2/2025)    INFLUENZA VACCINE  07/01/2025    COLORECTAL CANCER SCREENING  04/16/2026    ANNUAL WELLNESS VISIT  06/09/2026    DXA SCAN  06/24/2026    TDAP/TD VACCINES (2 - Td or Tdap) 03/30/2027    Pneumococcal Vaccine 50+  Completed    HEPATITIS C SCREENING  Addressed    MAMMOGRAM  Discontinued    ZOSTER VACCINE  Discontinued              Assessment & Plan     CMS Preventative Services Quick Reference  Risk Factors Identified During Encounter  None Identified  The above risks/problems have been discussed with the patient.  Follow up actions/plans if indicated are seen below in the Assessment/Plan Section.  Pertinent information has been shared with the patient in the After Visit Summary.    Diagnoses and all orders for this visit:    1. Annual physical exam (Primary)  -     Lipid Panel; Future  -     TSH; Future  -     T4, Free; Future    2. Medicare annual wellness visit, subsequent  -     Lipid Panel; Future  -     TSH; Future  -     T4, Free; Future    3. Atrial fibrillation, unspecified type  -     TSH; Future  -     T4, Free; Future    4. Essential  hypertension  -     TSH; Future  -     T4, Free; Future    5. Rheumatoid arthritis with rheumatoid factor of multiple sites without organ or systems involvement    6. Generalized osteoarthritis    7. Medication monitoring encounter    8. Seasonal allergic rhinitis, unspecified trigger  -     azelastine (ASTELIN) 0.1 % nasal spray; Administer 2 sprays into the nostril(s) as directed by provider 2 (Two) Times a Day. Use in each nostril as directed  Dispense: 30 mL; Refill: 12    9. Menopausal and female climacteric states  -     estradiol (CLIMARA) 0.05 MG/24HR patch; Place 1 patch on the skin as directed by provider 1 (One) Time Per Week.  Dispense: 12 each; Refill: 4    10. Colon cancer screening  -     Cologuard - Stool, Per Rectum; Future    11. Mixed stress and urge urinary incontinence  -     Ambulatory Referral to Urology    Other orders  -     amLODIPine (NORVASC) 5 MG tablet; Take 1 tablet by mouth Daily.  Dispense: 90 tablet; Refill: 3  -     Diclofenac Sodium (VOLTAREN) 1 % gel gel; Apply 4 g topically to the appropriate area as directed Take As Directed. Apply 2-4 grams by topical route 3-4 times every day to the affected area(s) PRN  Dispense: 100 g; Refill: 3      Assessment & Plan      Follow Up:  Return in about 6 months (around 12/9/2025) for Recheck.     An After Visit Summary and PPPS were given to the patient.

## 2025-06-12 NOTE — PROGRESS NOTES
Subjective   Roxie Bruce is a 79 y.o. female    Chief Complaint    Annual physical exam  Medicare annual wellness visit subsequent  Hypertension  Atrial fibrillation  Rheumatoid arthritis  Allergic rhinitis  Urinary incontinence  Possible cystocele  Multiple skin concerns      History of Present Illness  History of Present Illness  The patient is a 79-year-old female who presents today for an annual physical exam and Medicare annual wellness visit. She is also here for follow-up regarding hypertension, atrial fibrillation, rheumatoid arthritis, osteoarthritis, and seasonal allergic rhinitis.    She reports experiencing bladder leakage and urethral protrusion, which she manages by manually repositioning the urethra. She uses medium pads supplemented with folded toilet tissue to minimize pad usage. The leakage typically occurs during coughing, sneezing, or delayed urination. She also notes that it can happen during exercise at [employer], particularly when using machines or straining. She expresses concern about potential risks associated with surgical intervention. She has not sought medical attention for this issue. She finds cranberry juice helpful for her bladder condition. She applies hydrogen peroxide and Vaseline as needed for itching.    She is scheduled for a right breast biopsy in 07/2025 following two mammograms that identified a small abnormality.    She has an upcoming appointment with Dr. Bernstein on 08/2025 for a Watchman device follow-up. She was previously unaware of her atrial fibrillation diagnosis. She has consulted with Dr. Jason Chau and ROMMEL Fuller regarding her cardiac condition.    She has an appointment with Dr. Lucas Oleary, who initiated amlodipine therapy at a low dose of 2.5 mg.    She has an appointment with her rheumatologist next Monday. She is no longer on methotrexate and has been informed that her rheumatoid arthritis is in remission.    She has found azelastine to be  highly effective for her seasonal allergic rhinitis, noting improved nasal patency and reduced cognitive decline. She recalls a period of communication difficulty around the time of her COVID-19 infection, which was accompanied by sinus pain and fluid accumulation.    She reports an itchy rash on her neck, initially suspected to be an insect bite, which has persisted for several months. She also notes the development of tube-like structures on her back, resembling straw, which are also itchy. She has been applying hydrogen peroxide to these areas, which seems to alleviate the symptoms. The rash on her back has mostly resolved, but she notes a thickening of the skin on one side of her spine. She plans to apply hydrocortisone cream if the rash recurs. She has a dermatology appointment with Angie Faust on 07/02/2025.      The following portions of the patient's history were reviewed and updated as appropriate: allergies, current medications, past social history and problem list    Review of Systems   Constitutional: Negative.  Negative for fatigue and unexpected weight change.   HENT: Negative.     Eyes: Negative.    Respiratory: Negative.  Negative for cough, chest tightness and shortness of breath.    Cardiovascular: Negative.  Negative for chest pain, palpitations and leg swelling.   Gastrointestinal: Negative.  Negative for nausea.   Endocrine: Negative.    Genitourinary:  Positive for difficulty urinating, frequency and urgency. Negative for dysuria.   Musculoskeletal:  Positive for arthralgias and myalgias. Negative for gait problem and neck pain.   Skin: Negative.  Negative for color change and rash.   Allergic/Immunologic: Negative.    Neurological: Negative.  Negative for dizziness, syncope, weakness and headaches.   Hematological: Negative.    Psychiatric/Behavioral: Negative.     All other systems reviewed and are negative.      Objective     Vitals:    06/09/25 1029   BP: 150/88   Pulse: 63   Resp: 16    Temp: 98.5 °F (36.9 °C)   SpO2: 100%       Physical Exam  Vitals and nursing note reviewed.   Constitutional:       General: She is not in acute distress.     Appearance: Normal appearance. She is well-developed. She is not ill-appearing, toxic-appearing or diaphoretic.   HENT:      Head: Normocephalic and atraumatic.      Right Ear: External ear normal.      Left Ear: External ear normal.      Nose: Nose normal.      Mouth/Throat:      Pharynx: Oropharynx is clear.   Eyes:      General: No scleral icterus.     Conjunctiva/sclera: Conjunctivae normal.      Pupils: Pupils are equal, round, and reactive to light.   Neck:      Thyroid: No thyromegaly.      Vascular: No carotid bruit or JVD.   Cardiovascular:      Rate and Rhythm: Normal rate and regular rhythm.      Pulses: Normal pulses.      Heart sounds: Normal heart sounds. No murmur heard.  Pulmonary:      Effort: Pulmonary effort is normal. No respiratory distress.      Breath sounds: Normal breath sounds.   Abdominal:      General: Bowel sounds are normal.      Palpations: Abdomen is soft. There is no mass.      Tenderness: There is no abdominal tenderness.   Musculoskeletal:         General: No swelling. Normal range of motion.      Cervical back: Normal range of motion and neck supple.   Lymphadenopathy:      Cervical: No cervical adenopathy.   Skin:     General: Skin is warm and dry.      Findings: No lesion or rash.      Comments: Raised, palpable, striae type lesions across lower back running horizontally???   Neurological:      General: No focal deficit present.      Mental Status: She is alert and oriented to person, place, and time.      Cranial Nerves: No cranial nerve deficit.      Sensory: No sensory deficit.      Motor: No weakness.      Coordination: Coordination normal.      Gait: Gait normal.      Deep Tendon Reflexes: Reflexes are normal and symmetric.   Psychiatric:         Mood and Affect: Mood normal.         Behavior: Behavior normal.          Thought Content: Thought content normal.         Judgment: Judgment normal.       Physical Exam  Cardiovascular: Occasionally there is a little extra beat, but for the most part, it is very regular.  Skin: There is a scar on both sides of the skin.    Assessment & Plan   Assessment & Plan  1. Bladder leakage.  - Reports bladder leakage and urethra protrusion, managed by manually repositioning the urethra.  - Physical exam findings suggest possible bladder prolapse.  - Discussed potential need for surgical intervention and referral to a specialist.  - Referral to a urologist will be made for further evaluation and management.    2. Hypertension.  - Blood pressure currently at 150 mmHg.  - Dosage of amlodipine will be increased from 2.5 mg to 5 mg.  - Advised to take two 2.5 mg tablets to achieve the new dosage.  - Follow-up appointment with Dr. Champion on 08/25/2025 for further management.    3. Atrial fibrillation.  - Follow-up appointment with Dr. Champion on 08/25/2025 for Watchman device.  - No current symptoms of atrial fibrillation reported.  - Previous detection of atrial fibrillation without patient awareness.  - Continued monitoring and management as per cardiology recommendations.    4. Seasonal allergic rhinitis.  - Reports significant improvement with azelastine nasal spray.  - Nasal passages are now open, improving breathing and cognitive function.  - Continues to experience occasional scalp sensations.  - No changes in current treatment plan as symptoms are well-managed.    Problems Addressed this Visit          Cardiac and Vasculature    Essential hypertension    Relevant Medications    amLODIPine (NORVASC) 5 MG tablet    Other Relevant Orders    TSH (Completed)    T4, Free (Completed)       Genitourinary and Reproductive     Menopausal and female climacteric states    Relevant Medications    estradiol (CLIMARA) 0.05 MG/24HR patch       Musculoskeletal and Injuries    Generalized osteoarthritis (Chronic)     Rheumatoid arthritis with rheumatoid factor of multiple sites without organ or systems involvement     Other Visit Diagnoses         Annual physical exam    -  Primary    Relevant Orders    Lipid Panel (Completed)    TSH (Completed)    T4, Free (Completed)      Medicare annual wellness visit, subsequent        Relevant Orders    Lipid Panel (Completed)    TSH (Completed)    T4, Free (Completed)      Atrial fibrillation, unspecified type        Relevant Medications    amLODIPine (NORVASC) 5 MG tablet    Other Relevant Orders    TSH (Completed)    T4, Free (Completed)      Medication monitoring encounter          Seasonal allergic rhinitis, unspecified trigger        Relevant Medications    azelastine (ASTELIN) 0.1 % nasal spray      Colon cancer screening        Relevant Orders    Cologuard - Stool, Per Rectum      Mixed stress and urge urinary incontinence        Relevant Orders    Ambulatory Referral to Urology          Diagnoses         Codes Comments      Annual physical exam    -  Primary ICD-10-CM: Z00.00  ICD-9-CM: V70.0       Medicare annual wellness visit, subsequent     ICD-10-CM: Z00.00  ICD-9-CM: V70.0       Atrial fibrillation, unspecified type     ICD-10-CM: I48.91  ICD-9-CM: 427.31       Essential hypertension     ICD-10-CM: I10  ICD-9-CM: 401.9       Rheumatoid arthritis with rheumatoid factor of multiple sites without organ or systems involvement     ICD-10-CM: M05.79  ICD-9-CM: 714.0       Generalized osteoarthritis     ICD-10-CM: M15.9  ICD-9-CM: 715.00       Medication monitoring encounter     ICD-10-CM: Z51.81  ICD-9-CM: V58.83       Seasonal allergic rhinitis, unspecified trigger     ICD-10-CM: J30.2  ICD-9-CM: 477.9       Menopausal and female climacteric states     ICD-10-CM: N95.1  ICD-9-CM: 627.2       Colon cancer screening     ICD-10-CM: Z12.11  ICD-9-CM: V76.51       Mixed stress and urge urinary incontinence     ICD-10-CM: N39.46  ICD-9-CM: 788.33           Preventive medicine discussed,  diet, exercise, healthy living discussed at length.  Discussed nutrition, physical activity, healthy weight, injury prevention, misuse of tobacco, alcohol and drugs, dental health, mental health, immunizations, screening    Part of this note may be an electronic transcription/translation of spoken language to printed text using the Dragon Dictation System.

## 2025-06-16 ENCOUNTER — OFFICE VISIT (OUTPATIENT)
Age: 80
End: 2025-06-16
Payer: MEDICARE

## 2025-06-16 VITALS
BODY MASS INDEX: 19.78 KG/M2 | TEMPERATURE: 97.2 F | WEIGHT: 123.1 LBS | DIASTOLIC BLOOD PRESSURE: 76 MMHG | HEART RATE: 74 BPM | HEIGHT: 66 IN | SYSTOLIC BLOOD PRESSURE: 142 MMHG

## 2025-06-16 DIAGNOSIS — Z79.899 HIGH RISK MEDICATION USE: ICD-10-CM

## 2025-06-16 DIAGNOSIS — M05.79 RHEUMATOID ARTHRITIS WITH RHEUMATOID FACTOR OF MULTIPLE SITES WITHOUT ORGAN OR SYSTEMS INVOLVEMENT: Primary | ICD-10-CM

## 2025-06-16 NOTE — PROGRESS NOTES
Office Follow Up      Date: 06/16/2025   Patient Name: Roxie Bruce  MRN: [id number]  YOB: 1945    Referring Physician: No ref. provider found     Chief Complaint:   Chief Complaint   Patient presents with    Rheumatoid Arthritis       History of Present Illness: Roxie Bruce is a 79 y.o. female who is here today for follow up on rheumatoid arthritis.     Today she reports feeling the same. She rates her pain 2 out of 10 and has 2 minutes of morning stiffness.   She is currently off of DMARDs and biologic therapy.  No RA flares.  Overall doing well  Stopped methotrexate 7/24 with discovery of atrial fibrillation.  She has had a Watchman procedure in the interim    History of Present Illness        Subjective       Review of Systems: Review of Systems   Constitutional:  Negative for chills, fatigue, fever and unexpected weight loss.   HENT:  Positive for rhinorrhea, sinus pressure, sneezing and tinnitus. Negative for mouth sores and sore throat.    Eyes:  Negative for pain and redness.   Respiratory:  Negative for cough and shortness of breath.    Cardiovascular:  Negative for chest pain.   Gastrointestinal:  Positive for constipation. Negative for abdominal pain, blood in stool, diarrhea, nausea, vomiting and GERD.   Endocrine: Positive for polyuria. Negative for polydipsia.   Genitourinary:  Positive for urinary incontinence. Negative for dysuria, genital sores and hematuria.   Musculoskeletal:  Negative for arthralgias, back pain, joint swelling, myalgias, neck pain and neck stiffness.   Skin:  Negative for rash and bruise.   Allergic/Immunologic: Positive for environmental allergies.   Neurological:  Positive for speech difficulty and memory problem. Negative for seizures, weakness and numbness.   Hematological:  Negative for adenopathy. Does not bruise/bleed easily.   Psychiatric/Behavioral:  Negative for depressed mood. The patient is not nervous/anxious.          Past Medical History:   Past Medical History:   Diagnosis Date    Abnormal ECG 2024    Angioedema 05/20/2024    Angioedema of lips 10/2018    Arthritis ? Years ago    Osteoarthritis    Atrial fibrillation     Cataract ?    Surgery on both eyes    Colon polyp ?    Removed during 2 colonoscopies    Diverticulosis ?    Glaucoma ?    Being watched by Dr. Grant Hill    Heart murmur ?    Mitro valve prolapse    Heart valve disease     High risk medication use 05/20/2024    Hyperlipidemia ?    HDL is high    Hypertension     Mitral valve prolapse ? 1976    Osteoarthritis     Positive CORBY (antinuclear antibody)     Rheumatoid arthritis     Positive rheumatoid factor 40\       Past Surgical History:   Past Surgical History:   Procedure Laterality Date    ATRIAL APPENDAGE EXCLUSION LEFT WITH TRANSESOPHAGEAL ECHOCARDIOGRAM N/A 09/10/2024    Procedure: Atrial Appendage Occlusion;  Surgeon: Neri Bernstein DO;  Location: [facility] INVASIVE LOCATION;  Service: Cardiology;  Laterality: N/A;    BREAST BIOPSY Left     x2 20-30 years ago     CARDIAC SURGERY      Watchman Procedure    CARDIOVERSION      COLONOSCOPY  04/20/2016    Most recent one.    EYE SURGERY      Cataract    HYSTERECTOMY      OOPHORECTOMY         Family History:   Family History   Problem Relation Age of Onset    Osteoarthritis Mother     Hyperlipidemia Mother     Arthritis Mother         ? professionally diagnosed    Prostate cancer Father     Arthritis Father         ? professionally diagnosed    Atrial fibrillation Sister     Breast cancer Other         age unknown    Hyperlipidemia Maternal Grandfather     Diabetes Maternal Grandmother     Cancer Paternal Grandfather     Cancer Paternal Grandmother     Diabetes Maternal Aunt     Endometrial cancer Neg Hx     Ovarian cancer Neg Hx        Social History:   Social History     Socioeconomic History    Marital status:    Tobacco Use    Smoking status: Never     Passive exposure: Past    Smokeless  "tobacco: Never   Vaping Use    Vaping status: Never Used    Passive vaping exposure: Yes   Substance and Sexual Activity    Alcohol use: Yes     Alcohol/week: 1.0 standard drink of alcohol     Types: 1 Glasses of wine per week     Comment: rarely    Drug use: No    Sexual activity: Not Currently     Partners: Male       Medications:   Current Outpatient Medications:     amLODIPine (NORVASC) 5 MG tablet, Take 1 tablet by mouth Daily., Disp: 90 tablet, Rfl: 3    aspirin 81 MG chewable tablet, Chew 1 tablet Daily., Disp: , Rfl:     azelastine (ASTELIN) 0.1 % nasal spray, Administer 2 sprays into the nostril(s) as directed by provider 2 (Two) Times a Day. Use in each nostril as directed, Disp: 30 mL, Rfl: 12    cholecalciferol (VITAMIN D3) 1000 UNITS tablet, Take 2 tablets by mouth Daily., Disp: , Rfl:     Diclofenac Sodium (VOLTAREN) 1 % gel gel, Apply 4 g topically to the appropriate area as directed Take As Directed. Apply 2-4 grams by topical route 3-4 times every day to the affected area(s) PRN, Disp: 100 g, Rfl: 3    estradiol (CLIMARA) 0.05 MG/24HR patch, Place 1 patch on the skin as directed by provider 1 (One) Time Per Week., Disp: 12 each, Rfl: 4    FOLIC ACID PO, Take 1 tablet/day by mouth Daily., Disp: , Rfl:     metoprolol tartrate (LOPRESSOR) 25 MG tablet, Take 0.5 tablets by mouth 2 (Two) Times a Day., Disp: 90 tablet, Rfl: 3    Probiotic Product (PROBIOTIC ADVANCED PO), Take 1 tablet by mouth Daily., Disp: , Rfl:     Allergies:   Allergies   Allergen Reactions    Lisinopril Angioedema    Hydroxychloroquine Nausea And Vomiting, Anxiety and Confusion           Objective        Vital Signs:   Vitals:    06/16/25 1030   BP: 142/76   BP Location: Right arm   Patient Position: Sitting   Cuff Size: Adult   Pulse: 74   Temp: 97.2 °F (36.2 °C)   Weight: 55.8 kg (123 lb 1.6 oz)   Height: 166.4 cm (65.5\")   PainSc: 2      Body mass index is 20.17 kg/m².      Physical Exam:  Physical Exam   MUSCULOSKELETAL:   No " "peripheral synovitis  OA changes of fingers bilaterally with squaring of bilateral CMC     Complete joint exam was performed including the MCPs, PIPs, DIPs of the hands, wrists, elbows, shoulders, hips, knees and ankles.  No soft tissue swelling or tenderness is present except as above.    General: The patient is well-developed and well nourished. Cooperative, alert and oriented. Affect is normal. Hydration appears normal.   HEENT: Normocephalic and atraumatic. Lids and conjunctiva are normal. Pupils are equal and sclera are clear. Oropharynx is clear   NECK neck is supple without adenopathy, masses or thyromegaly.   CARDIOVASCULAR: Regular rate and rhythm. No murmurs, rubs or gallops   LUNGS: Effort is normal. Lungs are clear bilateral   ABDOMEN: Not examined  EXTREMITIES: Peripheral pulses are intact. No clubbing.   SKIN: No rashes. No subcutaneous nodules. No digital ulcers. No sclerodactyly.   NEUROLOGIC: Gait is normal. Strength testing is normal.  No focal neurologic deficits    Results Review:   Labs:   Lab Results   Component Value Date    GLUCOSE 99 06/09/2025    BUN 12.0 06/09/2025    CREATININE 0.77 06/09/2025    EGFR 79.1 06/09/2025    BCR 15.6 06/09/2025    K 4.4 06/09/2025    CO2 26.0 06/09/2025    CALCIUM 9.3 06/09/2025    ALBUMIN 4.3 06/09/2025    BILITOT 0.8 06/09/2025    AST 25 06/09/2025    ALT 14 06/09/2025     Lab Results   Component Value Date    WBC 7.17 06/09/2025    HGB 14.0 06/09/2025    HCT 41.9 06/09/2025    MCV 91.3 06/09/2025     06/09/2025     Lab Results   Component Value Date    SEDRATE 8 06/09/2025     Lab Results   Component Value Date    CRP <0.30 06/09/2025     No results found for: \"QUANTIFERO\", \"QUANTITB1\", \"QUANTITB2\", \"QUANTIFERN\", \"QUANTIFERM\", \"QUANTITBGLDP\"  Lab Results   Component Value Date    RF Positive (A) 12/04/2018     No results found for: \"HEPBSAG\", \"HEPAIGM\", \"HEPBIGMCORE\", \"HEPCVIRUSABY\"      Procedures    Assessment / Plan        - Rheumatoid arthritis " with rheumatoid factor of multiple sites   Assessment & Plan:  +++ RF; +CORBY 1: 160.  Episodic flares in joints which include hips, shoulders, knees since July 2018.  Prior methotrexate 1/2019- stopped 6/2024 due to starting Eliquis for a fib.  Intolerant Plaquenil 7/24  **Current Rx diclofenac gel     RA remains in remission off DMARD. swollen joint count 0.  Tender joint count 0  Patient global 2.  Physician global 2.  CDAI 4.  She was intolerant to Plaquenil in 2024 due to a rash and feeling ill with it.     -Recent labs reviewed today 6/25 are stable.  Inflammatory markers are normal  -She does not feel the need to resume methotrexate and I agree.    -Continue monitoring off DMARD therapy  -Lab ordered as below to be done in 6 months prior to follow-up at [facility] lab  Return to clinic 6-months       - Generalized osteoarthritis  Assessment & Plan:  She is stable.  Topical diclofenac very effective for her OA.  Refilled  Intermittent tenderness left second toe with hammertoe due to OA.         - Raynaud's phenomenon without gangrene  Assessment & Plan:  Longstanding history of Raynaud's.  She has had no digital ulcerations.  Avoid cold exposure.     Encouraged to keep extremities warm.  Recommend that she avoid nicotine and caffeine as these can make the Raynaud's symptoms worse.  No evidence of a secondary connective tissue disease.     - High risk medication use  Assessment & Plan:  No longer on methotrexate or other DMARDs.  She is doing well clinically today.        - At risk for osteoporosis  Assessment & Plan:  DEXA was normal 8/25/2021 at ACL. Recheck in 5 years.         - CORBY positive  Assessment & Plan:  CORBY was positive at 1: 160.     There is no evidence of lupus or connective tissue disease.    This could be related to either age or medication or rheumatoid arthritis.     - History of angioedema  Assessment & Plan:  Hx of October 2018  dermatology dr fofana  ? acei induced     One episode October 2018.   Resolved without recurrence.    ? related to her prior ACE inhibitor use.    Follow-up with dermatology or allergy if recurrence.  Avoid further ACE inhibitors        - Atrial fibrillation  Assessment & Plan:  Found June 2024 during Wellness Check.  S/p cardioversion with Dr. Chau and Harshil procedure         1. Rheumatoid arthritis with rheumatoid factor of multiple sites without organ or systems involvement    2. High risk medication use        Assessment & Plan        Orders Placed This Encounter   Procedures    CBC Auto Differential    Comprehensive Metabolic Panel    C-reactive Protein    Sedimentation Rate    Rheumatoid Factor    Cyclic Citrul Peptide Antibody, IgG / IgA     New Medications Ordered This Visit   Medications    Diclofenac Sodium (VOLTAREN) 1 % gel gel     Sig: Apply 4 g topically to the appropriate area as directed Take As Directed. Apply 2-4 grams by topical route 3-4 times every day to the affected area(s) PRN     Dispense:  100 g     Refill:  3       Follow Up:   Return in about 6 months (around 12/16/2025).      Discussed plan of care in detail with the patient today.  Patient verbalized understanding and agrees.    I confirm accuracy of unchanged data/findings which have been carried forward from previous visit.  I have updated appropriately those that have changed.        Gray Carrasco MD  [facility] [place]

## 2025-07-03 ENCOUNTER — OFFICE VISIT (OUTPATIENT)
Dept: UROLOGY | Facility: CLINIC | Age: 80
End: 2025-07-03
Payer: MEDICARE

## 2025-07-03 DIAGNOSIS — N39.41 URGE INCONTINENCE: Primary | ICD-10-CM

## 2025-07-03 DIAGNOSIS — R32 URINARY INCONTINENCE, UNSPECIFIED TYPE: ICD-10-CM

## 2025-07-03 DIAGNOSIS — N39.3 SUI (STRESS URINARY INCONTINENCE, FEMALE): ICD-10-CM

## 2025-07-03 LAB
BILIRUB BLD-MCNC: NEGATIVE MG/DL
CLARITY, POC: CLEAR
COLOR UR: YELLOW
EXPIRATION DATE: NORMAL
GLUCOSE UR STRIP-MCNC: NEGATIVE MG/DL
KETONES UR QL: NEGATIVE
LEUKOCYTE EST, POC: NEGATIVE
Lab: NORMAL
NITRITE UR-MCNC: NEGATIVE MG/ML
PH UR: 6.5 [PH] (ref 5–8)
PROT UR STRIP-MCNC: NEGATIVE MG/DL
RBC # UR STRIP: NEGATIVE /UL
SP GR UR: 1.01 (ref 1–1.03)
UROBILINOGEN UR QL: NORMAL

## 2025-07-03 NOTE — PROGRESS NOTES
"     LUTS Female Office Visit      Patient Name: Roxie Bruce  : 1945   MRN: [id number]     Chief Complaint:  Lower Urinary Tract Symptoms.   Chief Complaint   Patient presents with    Urinary Incontinence     mixed       Referring Provider: Justus Esquivel*    History of Present Illness: Ms. Bruce is a 80 y.o. female with past medical history including atrial fibrillation, hypertension, urge incontinence, stress urinary incontinence, osteoarthritis, rheumatoid arthritis and history of angioedema.      Patient referred by PCP due to lower urinary tract symptoms including increased urinary urgency with incontinence and stress urinary incontinence. Patient reports she wears median coverage pads throughout the day. Patient reports there are times that she stands up and experiences incontinence.    Patient reports vaginal dryness.  Patient has been self-medicating with applying oxygen peroxide and Vaseline to the vaginal area.  Patient denies any concerns for recurrent UTIs. Urinalysis today is negative for infection or blood. Patient denies dysuria, gross hematuria or history of nephrolithiasis.    Patient reports a feeling of \"urethral prolapse\". Pelvic exam was performed to evaluate prolapse. No signs of urethral prolapse identified on pelvic exam. Vaginal dryness and irritation noted.     Patient reports stress urinary incontinence and urinary urgency with incontinence is bothersome.      Subjective      Review of System: Review of Systems   Genitourinary:  Positive for urgency.        DAVID, urgency with incontinence   All other systems reviewed and are negative.     I have reviewed the ROS documented by my clinical staff, I have updated appropriately and I agree. BROOKS Harper    Past Medical History:  Past Medical History:   Diagnosis Date    Abnormal ECG     Angioedema 2024    Angioedema of lips 10/2018    Arthritis ? Years ago    Osteoarthritis    Atrial fibrillation     " Cataract ?    Surgery on both eyes    Colon polyp ?    Removed during 2 colonoscopies    Diverticulosis ?    Glaucoma ?    Being watched by Dr. Grant Hill    Heart murmur ?    Mitro valve prolapse    Heart valve disease     High risk medication use 05/20/2024    Hyperlipidemia ?    HDL is high    Hypertension     Mitral valve prolapse ? 1976    Osteoarthritis     Positive CORBY (antinuclear antibody)     Rheumatoid arthritis     Positive rheumatoid factor 40\    Urinary incontinence        Past Surgical History:  Past Surgical History:   Procedure Laterality Date    ATRIAL APPENDAGE EXCLUSION LEFT WITH TRANSESOPHAGEAL ECHOCARDIOGRAM N/A 09/10/2024    Procedure: Atrial Appendage Occlusion;  Surgeon: Neri Bernstein DO;  Location: [facility] INVASIVE LOC[facility];  Service: Cardiology;  Laterality: N/A;    BREAST BIOPSY Left     x2 20-30 years ago     CARDIAC SURGERY      Watchman Procedure    CARDIOVERSION      COLONOSCOPY  04/20/2016    Most recent one.    EYE SURGERY      Cataract    HYSTERECTOMY      OOPHORECTOMY         Medications:    Current Outpatient Medications:     amLODIPine (NORVASC) 5 MG tablet, Take 1 tablet by mouth Daily., Disp: 90 tablet, Rfl: 3    aspirin 81 MG chewable tablet, Chew 1 tablet Daily., Disp: , Rfl:     azelastine (ASTELIN) 0.1 % nasal spray, Administer 2 sprays into the nostril(s) as directed by provider 2 (Two) Times a Day. Use in each nostril as directed, Disp: 30 mL, Rfl: 12    cholecalciferol (VITAMIN D3) 1000 UNITS tablet, Take 2 tablets by mouth Daily., Disp: , Rfl:     Diclofenac Sodium (VOLTAREN) 1 % gel gel, Apply 4 g topically to the appropriate area as directed Take As Directed. Apply 2-4 grams by topical route 3-4 times every day to the affected area(s) PRN, Disp: 100 g, Rfl: 3    estradiol (CLIMARA) 0.05 MG/24HR patch, Place 1 patch on the skin as directed by provider 1 (One) Time Per Week., Disp: 12 each, Rfl: 4    FOLIC ACID PO, Take 1 tablet/day by mouth Daily., Disp: , Rfl:      metoprolol tartrate (LOPRESSOR) 25 MG tablet, Take 0.5 tablets by mouth 2 (Two) Times a Day., Disp: 90 tablet, Rfl: 3    Probiotic Product (PROBIOTIC ADVANCED PO), Take 1 tablet by mouth Daily., Disp: , Rfl:     Allergies:  Allergies   Allergen Reactions    Lisinopril Angioedema    Hydroxychloroquine Nausea And Vomiting, Anxiety and Confusion       Social History:  Social History     Socioeconomic History    Marital status:    Tobacco Use    Smoking status: Never     Passive exposure: Past    Smokeless tobacco: Never   Vaping Use    Vaping status: Never Used    Passive vaping exposure: Yes   Substance and Sexual Activity    Alcohol use: Yes     Alcohol/week: 1.0 standard drink of alcohol     Types: 1 Glasses of wine per week     Comment: rarely    Drug use: No    Sexual activity: Not Currently     Partners: Male       Family History:  Family History   Problem Relation Age of Onset    Osteoarthritis Mother     Hyperlipidemia Mother     Arthritis Mother         ? professionally diagnosed    Prostate cancer Father     Arthritis Father         ? professionally diagnosed    Atrial fibrillation Sister     Breast cancer Other         age unknown    Hyperlipidemia Maternal Grandfather     Diabetes Maternal Grandmother     Cancer Paternal Grandfather     Cancer Paternal Grandmother     Diabetes Maternal Aunt     Endometrial cancer Neg Hx     Ovarian cancer Neg Hx      Bladder & Bowel Symptom Questionnaire    How often do you usually urinate during the day ?   2 - About every 2-3 hours    2.   How many timed do you urinate at night?   1 - 2 times at night   3.   What is the reason that you usually urinate?   0 - Out of convenience (no urge)   4.   Once you get the urge to go, how long can you     comfortably delay?   2 - 10-30 min   5.   How often do you get a sudden urge that makes you rush to the bathroom?   2 - A few times a month   6.   How often does a sudden urge to urinate result in you leaking urine or  wetting pads?   2 - A few times a month   7.  In your opinion, how good is your bladder control?   2 - Good   8.  Do you have accidental bowel leakage?   no   9.  Do you have difficulty fully emptying your bladder?   no   10.  Do you experience accidental leakage when performing some physical activity such as coughing, sneezing, laughing or exercise?   yes   11. Have you tried medications to help improve your symptoms?   no   12. Would you be interested in learning about a long-lasting option that may help you with your symptoms?   yes                                                                             Total Score   11     0-7 (Mild) 8-16 (Moderate) 17-28 (Severe)        Post void residual bladder scan:    69 cc     Objective     Physical Exam:   Vital Signs: There were no vitals filed for this visit.  There is no height or weight on file to calculate BMI.     Physical Exam  Vitals and nursing note reviewed.   Constitutional:       Appearance: Normal appearance.   Pulmonary:      Effort: Pulmonary effort is normal.   Neurological:      Mental Status: She is alert and oriented to person, place, and time.   Psychiatric:         Mood and Affect: Mood normal.         Behavior: Behavior normal.         Labs:   Brief Urine Lab Results  (Last result in the past 365 days)        Color   Clarity   Blood   Leuk Est   Nitrite   Protein   CREAT   Urine HCG        07/03/25 1114 Yellow   Clear   Negative   Negative   Negative   Negative                   Urine Culture          2/5/2025    15:11   Urine Culture   Urine Culture No growth         Lab Results   Component Value Date    GLUCOSE 99 06/09/2025    CALCIUM 9.3 06/09/2025     06/09/2025    K 4.4 06/09/2025    CO2 26.0 06/09/2025    CL 98 06/09/2025    BUN 12.0 06/09/2025    CREATININE 0.77 06/09/2025    EGFRIFNONA 111 01/09/2019    BCR 15.6 06/09/2025    ANIONGAP 12.0 06/09/2025       Lab Results   Component Value Date    WBC 7.17 06/09/2025    HGB 14.0  06/09/2025    HCT 41.9 06/09/2025    MCV 91.3 06/09/2025     06/09/2025       Images:   Mammo Diagnostic Digital Tomosynthesis Bilateral With CAD  Result Date: 5/15/2025  1. Benign findings left breast.  2. Possible branching forms are present and grouped calcifications located in the upper outer quadrant. Recommendation is for stereotactic core biopsy.  RECOMMENDATION: Recommend stereotactic core biopsy of grouped calcifications present in the right upper outer quadrant.  ACR BI-RADS CATEGORY: 4, SUSPICIOUS  CAD was utilized.  The standard false-negative rate of mammography is between 10% and 25%. Complex patterns or increased breast density will markedly elevate the false-negative rate of mammography.    A letter, in lay terminology, with the results of this exam was given to the patient at the time of the visit.  At our facility, a triangular marker is positioned over a palpable area of concern indicated by the patient. A [place] marker is placed over a visible skin lesion. A linear marker indicates a scar.  ________________________________________________________________________ _______ Physicians Order  Stereotactic Breast Biopsy  Diagnosis: Abnormal Mammogram  5/15/2025 12:02 PM by Dr. Carlita Sanchez MD on Workstation: [id number]      Mammo Screening Digital Tomosynthesis Bilateral With CAD  Result Date: 5/3/2025  Impression: BI-RADS  0 Incomplete: NEED ADDITIONAL IMAGING EVALUATION  Recommendation:  The patient needs additional imaging. Right CC and right 90 degree lateral magnification views. Nipple in profile combination 2D 3D left CC and left MLO spot compression views bilateral nipple in profile combination 2D 3D 90 degree lateral views.. She will be contacted by our office to schedule an appointment for the additional studies.  Please accept this as an order.   CAD was utilized.  The standard false-negative rate of mammography is between 10% and 25%. Complex patterns or increased breast density will  markedly elevate the false-negative rate of mammography.    A letter, in lay terminology, with the results of this exam will be mailed to the patient.  _____________________________________________________ Physician Order  Diagnostic Mammogram with Breast Ultrasound if needed.  Diagnosis:   Abnormal Mammogram    5/3/2025 2:10 PM by Dr. Alva Barcenas MD on Workstation: [id number]        Measures:   Tobacco:   Roxie Bruce  reports that she has never smoked. She has been exposed to tobacco smoke. She has never used smokeless tobacco.          Urine Incontinence: Patient reports that she is currently experiencing symptoms of urinary incontinence.       Assessment / Plan      Assessment:  Mrs. Bruce is a 80 y.o. female who presented today with lower urinary tract symptoms including urinary urgency with incontinence and DAVID. We discussed placing referral for pelvic floor physical therapy to evaluate and treat potential dysfunction of pelvic floor which can exacerbate urinary symptoms. I recommend patient to discontinue applying oxygen peroxide to vaginal tissue. We discussed applying organic coconut oil to external vaginal tissue to improve vaginal dryness. Patient is understanding and agreeable with plan of care. She will alert with questions or concerns in the interim.        Diagnoses and all orders for this visit:    1. Urge incontinence (Primary)  -     POC Urinalysis Dipstick, Automated  -     Ambulatory Referral to Physical Therapy for Evaluation & Treatment    2. Urinary incontinence, unspecified type    3. DAVID (stress urinary incontinence, female)  -     Ambulatory Referral to Physical Therapy for Evaluation & Treatment           Follow Up:   Return in about 6 weeks (around 8/14/2025) for Next scheduled follow up.    I spent approximately 45 minutes providing clinical care for this patient; including review of patient's chart and provider documentation, face to face time spent with patient in  examination room (obtaining history, performing physical exam, discussing diagnosis and management options), placing orders, and completing patient documentation.     BROOKS Harper  [facility] [place]

## 2025-07-08 ENCOUNTER — HOSPITAL ENCOUNTER (OUTPATIENT)
Dept: MAMMOGRAPHY | Facility: HOSPITAL | Age: 80
Discharge: HOME | End: 2025-07-08
Payer: MEDICARE

## 2025-07-08 DIAGNOSIS — R92.8 ABNORMAL MAMMOGRAM: ICD-10-CM

## 2025-07-08 PROCEDURE — 25010000002 LIDOCAINE 1% - EPINEPHRINE 1:100000 1 %-1:100000 SOLUTION: Performed by: FAMILY MEDICINE

## 2025-07-08 PROCEDURE — 19081 BX BREAST 1ST LESION STRTCTC: CPT | Performed by: RADIOLOGY

## 2025-07-08 PROCEDURE — 88342 IMHCHEM/IMCYTCHM 1ST ANTB: CPT | Performed by: FAMILY MEDICINE

## 2025-07-08 PROCEDURE — 77065 DX MAMMO INCL CAD UNI: CPT | Performed by: RADIOLOGY

## 2025-07-08 PROCEDURE — 88305 TISSUE EXAM BY PATHOLOGIST: CPT | Performed by: FAMILY MEDICINE

## 2025-07-08 PROCEDURE — C1819 TISSUE LOCALIZATION-EXCISION: HCPCS

## 2025-07-08 PROCEDURE — 25010000002 LIDOCAINE 1 % SOLUTION: Performed by: FAMILY MEDICINE

## 2025-07-08 RX ORDER — LIDOCAINE HYDROCHLORIDE AND EPINEPHRINE 10; 10 MG/ML; UG/ML
10 INJECTION, SOLUTION INFILTRATION; PERINEURAL ONCE
Status: COMPLETED | OUTPATIENT
Start: 2025-07-08 | End: 2025-07-08

## 2025-07-08 RX ORDER — LIDOCAINE HYDROCHLORIDE 10 MG/ML
15 INJECTION, SOLUTION INFILTRATION; PERINEURAL ONCE
Status: COMPLETED | OUTPATIENT
Start: 2025-07-08 | End: 2025-07-08

## 2025-07-08 RX ADMIN — LIDOCAINE HYDROCHLORIDE 12 ML: 10 INJECTION, SOLUTION INFILTRATION; PERINEURAL at 13:24

## 2025-07-08 RX ADMIN — LIDOCAINE HYDROCHLORIDE,EPINEPHRINE BITARTRATE 10 ML: 10; .01 INJECTION, SOLUTION INFILTRATION; PERINEURAL at 13:30

## 2025-07-10 ENCOUNTER — TELEPHONE (OUTPATIENT)
Dept: MAMMOGRAPHY | Facility: HOSPITAL | Age: 80
End: 2025-07-10
Payer: MEDICARE

## 2025-07-10 LAB
CYTO UR: NORMAL
LAB AP CASE REPORT: NORMAL
LAB AP CLINICAL INFORMATION: NORMAL
LAB AP DIAGNOSIS COMMENT: NORMAL
PATH REPORT.FINAL DX SPEC: NORMAL
PATH REPORT.GROSS SPEC: NORMAL

## 2025-07-10 NOTE — TELEPHONE ENCOUNTER
Patient notified of pathology results and recommendations. Verbalizes understanding. Denies discomfort. Denies signs and symptoms of infection.     Patient desires to contact provider about surgeon choice.  Patient is to call back with decision; an appointment will be scheduled and then be notified. Verbalizes understanding.

## 2025-07-11 ENCOUNTER — TELEPHONE (OUTPATIENT)
Dept: MAMMOGRAPHY | Facility: HOSPITAL | Age: 80
End: 2025-07-11
Payer: MEDICARE

## 2025-07-11 NOTE — TELEPHONE ENCOUNTER
Patient called in to let [facility] know she has an appointment with Dr. Esquivel 7/16/25.  She wants to talk to him about a surgeon for consult.  She will call BIS to inform of her decision after their discussion.

## 2025-07-16 ENCOUNTER — OFFICE VISIT (OUTPATIENT)
Dept: FAMILY MEDICINE CLINIC | Facility: CLINIC | Age: 80
End: 2025-07-16
Payer: MEDICARE

## 2025-07-16 VITALS
OXYGEN SATURATION: 98 % | HEART RATE: 68 BPM | WEIGHT: 129.4 LBS | SYSTOLIC BLOOD PRESSURE: 144 MMHG | HEIGHT: 66 IN | BODY MASS INDEX: 20.79 KG/M2 | TEMPERATURE: 97.5 F | DIASTOLIC BLOOD PRESSURE: 86 MMHG | RESPIRATION RATE: 16 BRPM

## 2025-07-16 DIAGNOSIS — R19.5 POSITIVE COLORECTAL CANCER SCREENING USING COLOGUARD TEST: ICD-10-CM

## 2025-07-16 DIAGNOSIS — N60.91 ATYPICAL LOBULAR HYPERPLASIA (ALH) OF RIGHT BREAST: Primary | ICD-10-CM

## 2025-07-16 PROCEDURE — 3077F SYST BP >= 140 MM HG: CPT | Performed by: FAMILY MEDICINE

## 2025-07-16 PROCEDURE — 3079F DIAST BP 80-89 MM HG: CPT | Performed by: FAMILY MEDICINE

## 2025-07-16 PROCEDURE — 99213 OFFICE O/P EST LOW 20 MIN: CPT | Performed by: FAMILY MEDICINE

## 2025-07-16 PROCEDURE — 1125F AMNT PAIN NOTED PAIN PRSNT: CPT | Performed by: FAMILY MEDICINE

## 2025-07-17 ENCOUNTER — TELEPHONE (OUTPATIENT)
Dept: MAMMOGRAPHY | Facility: HOSPITAL | Age: 80
End: 2025-07-17
Payer: MEDICARE

## 2025-07-17 NOTE — TELEPHONE ENCOUNTER
Patient called in, requests Dr LIYA Nuñez for surgical consult. Patient will be notified when an appointment is scheduled. Verbalized understanding.

## 2025-07-17 NOTE — TELEPHONE ENCOUNTER
Patient notified of surgical consult appointment with Dr LIYA Nuñez on 9/8/25 @ 1015 with arrival time of 0945, at the  [address]. Patient given office contact & location information. Patient notified to bring photo ID, insurance information, list of prescription & OTC medications. Patient may be accompanied by family member or friend for support. Patient verbalized understanding.

## 2025-07-20 NOTE — PROGRESS NOTES
Subjective   Roxie Bruce is a 80 y.o. female    Chief Complaint    Abnormal mammogram  Positive Cologuard    Breast Problem  Symptoms: no abdominal pain, no chest pain, no cough, no fatigue, no headaches, no nausea, no rash, no vomiting and no weakness    History of Present Illness  The patient is a 80-year-old female with a history of recent abnormal mammogram and biopsy, which revealed atypical cells. She wants to discuss a possible surgical referral.    A biopsy performed by Dr. Sanchez revealed atypical cells around the area of concern. She was advised to consult a surgeon and possibly undergo an MRI. She has been provided with the names of four potential surgeons. She reports having dense breast tissue.    She has a positive Cologuard test but states she is unable to have a colonoscopy due to a narrow colon. In 2016, she underwent an air barium enema and x-ray with contrast. She is uncertain if she had another Cologuard test in 2021. She has a history of polyp removal.    She is currently undergoing physical therapy for incontinence at [facility].    She regularly visits Dr. Marroquin for toenail clipping and suspects she may have an ingrown toenail. She has been applying hydrogen peroxide and coconut oil to the area.    PAST SURGICAL HISTORY:  - Air barium enema and x-ray with contrast in 2016  - Polyp removal (date unspecified)      The following portions of the patient's history were reviewed and updated as appropriate: allergies, current medications, past social history and problem list    Review of Systems   Constitutional:  Negative for fatigue and unexpected weight change.   Respiratory:  Negative for cough, chest tightness and shortness of breath.    Cardiovascular:  Negative for chest pain, palpitations and leg swelling.   Gastrointestinal:  Negative for abdominal distention, abdominal pain, anal bleeding, blood in stool, constipation, diarrhea, nausea, rectal pain and vomiting.   Skin:   Negative for color change and rash.   Neurological:  Negative for dizziness, syncope, weakness and headaches.     Objective     Vitals:    07/16/25 1447   BP: 144/86   Pulse: 68   Resp: 16   Temp: 97.5 °F (36.4 °C)   SpO2: 98%       Physical Exam  Vitals and nursing note reviewed.   Constitutional:       General: She is not in acute distress.     Appearance: Normal appearance. She is well-developed. She is not diaphoretic.   HENT:      Head: Normocephalic and atraumatic.   Eyes:      General: No scleral icterus.     Conjunctiva/sclera: Conjunctivae normal.   Cardiovascular:      Rate and Rhythm: Normal rate and regular rhythm.      Heart sounds: Normal heart sounds.   Pulmonary:      Effort: Pulmonary effort is normal.      Breath sounds: Normal breath sounds.   Abdominal:      General: Bowel sounds are normal. There is no distension.      Palpations: Abdomen is soft. There is no mass.      Tenderness: There is no abdominal tenderness. There is no guarding or rebound.      Hernia: No hernia is present.   Musculoskeletal:      Cervical back: Normal range of motion and neck supple.   Skin:     General: Skin is warm and dry.      Coloration: Skin is not pale.      Findings: No erythema or rash.   Neurological:      General: No focal deficit present.      Mental Status: She is alert and oriented to person, place, and time.   Psychiatric:         Mood and Affect: Mood normal.         Behavior: Behavior normal.     Physical Exam      Assessment & Plan   Assessment & Plan  1. Abnormal mammogram.  - Pathology report indicates benign breast parenchyma with fibrocystic changes, pseudoangiomatous stromal hyperplasia, fibroadenomas, atypical lobular hyperplasia, and microcalcifications.  - No evidence of atypical ductal hyperplasia.  - Discussed the findings and the benign nature of the pathology report.  - Referral to Dr. García for further evaluation; the surgeon may recommend an MRI or continued observation.    2. Positive  Cologuard test.  - Positive Cologuard test suggests the presence of polyps.  - History of inability to undergo colonoscopy due to a narrow colon.  - Discussed the need to determine the cause of the positive Cologuard test.  - Consultation with Dr. Ross will be arranged to determine the next steps, potentially including a barium enema.    3. Incontinence.  - Currently undergoing physical therapy for incontinence at [facility].  - No additional interventions discussed at this time.    4. Ingrown toenail.  - Advised to apply Vicks VapoRub to the affected area.  - Discussed the possibility of further intervention if the condition does not improve.    Problems Addressed this Visit    None  Visit Diagnoses         Atypical lobular hyperplasia (ALH) of right breast    -  Primary      Positive colorectal cancer screening using Cologuard test        Relevant Orders    Ambulatory Referral to Gastroenterology          Diagnoses         Codes Comments      Atypical lobular hyperplasia (ALH) of right breast    -  Primary ICD-10-CM: N60.91  ICD-9-CM: 610.8       Positive colorectal cancer screening using Cologuard test     ICD-10-CM: R19.5  ICD-9-CM: 787.7           I spent 25 minutes in patient care: Reviewing records prior to the visit, examining the patient, entering orders and documentation    Part of this note may be an electronic transcription/translation of spoken language to printed text using the Dragon Dictation System.

## 2025-08-05 ENCOUNTER — RESULTS FOLLOW-UP (OUTPATIENT)
Dept: FAMILY MEDICINE CLINIC | Facility: CLINIC | Age: 80
End: 2025-08-05
Payer: MEDICARE

## 2025-08-14 ENCOUNTER — OFFICE VISIT (OUTPATIENT)
Dept: UROLOGY | Facility: CLINIC | Age: 80
End: 2025-08-14
Payer: MEDICARE

## 2025-08-14 VITALS — HEART RATE: 55 BPM | DIASTOLIC BLOOD PRESSURE: 58 MMHG | OXYGEN SATURATION: 98 % | SYSTOLIC BLOOD PRESSURE: 149 MMHG

## 2025-08-14 DIAGNOSIS — R31.29 MICROSCOPIC HEMATURIA: Primary | ICD-10-CM

## 2025-08-14 LAB
BILIRUB BLD-MCNC: NEGATIVE MG/DL
BILIRUB UR QL STRIP: NEGATIVE
CLARITY UR: CLEAR
CLARITY, POC: CLEAR
COLOR UR: YELLOW
COLOR UR: YELLOW
EXPIRATION DATE: ABNORMAL
GLUCOSE UR STRIP-MCNC: NEGATIVE MG/DL
GLUCOSE UR STRIP-MCNC: NEGATIVE MG/DL
HGB UR QL STRIP.AUTO: NEGATIVE
KETONES UR QL STRIP: NEGATIVE
KETONES UR QL: NEGATIVE
LEUKOCYTE EST, POC: NEGATIVE
LEUKOCYTE ESTERASE UR QL STRIP.AUTO: NEGATIVE
Lab: ABNORMAL
NITRITE UR QL STRIP: NEGATIVE
NITRITE UR-MCNC: NEGATIVE MG/ML
PH UR STRIP.AUTO: 6.5 [PH] (ref 5–8)
PH UR: 6 [PH] (ref 5–8)
PROT UR QL STRIP: NEGATIVE
PROT UR STRIP-MCNC: NEGATIVE MG/DL
RBC # UR STRIP: ABNORMAL /UL
SP GR UR STRIP: 1.01 (ref 1–1.03)
SP GR UR: 1.01 (ref 1–1.03)
UROBILINOGEN UR QL STRIP: NORMAL
UROBILINOGEN UR QL: NORMAL

## 2025-08-14 PROCEDURE — 81001 URINALYSIS AUTO W/SCOPE: CPT

## 2025-08-15 LAB
BACTERIA UR QL AUTO: ABNORMAL /HPF
HYALINE CASTS UR QL AUTO: ABNORMAL /LPF
RBC # UR STRIP: ABNORMAL /HPF
REF LAB TEST METHOD: ABNORMAL
SQUAMOUS #/AREA URNS HPF: ABNORMAL /HPF
WBC # UR STRIP: ABNORMAL /HPF

## 2025-08-22 ENCOUNTER — HOSPITAL ENCOUNTER (OUTPATIENT)
Dept: CARDIOLOGY | Facility: HOSPITAL | Age: 80
Discharge: HOME | End: 2025-08-22
Payer: MEDICARE

## 2025-08-22 VITALS
SYSTOLIC BLOOD PRESSURE: 124 MMHG | RESPIRATION RATE: 14 BRPM | HEART RATE: 52 BPM | OXYGEN SATURATION: 96 % | DIASTOLIC BLOOD PRESSURE: 57 MMHG

## 2025-08-22 DIAGNOSIS — I48.11 LONGSTANDING PERSISTENT ATRIAL FIBRILLATION: Chronic | ICD-10-CM

## 2025-08-22 LAB
BH CV ECHO MEAS - AO ROOT DIAM: 2.8 CM
BH CV VAS BP RIGHT ARM: NORMAL MMHG
LV EF 2D ECHO EST: 55 %

## 2025-08-22 PROCEDURE — 25010000002 FENTANYL CITRATE (PF) 50 MCG/ML SOLUTION: Performed by: INTERNAL MEDICINE

## 2025-08-22 PROCEDURE — 93319 3D ECHO IMG CGEN CAR ANOMAL: CPT

## 2025-08-22 PROCEDURE — 25010000002 MIDAZOLAM PER 1 MG: Performed by: INTERNAL MEDICINE

## 2025-08-22 PROCEDURE — 93320 DOPPLER ECHO COMPLETE: CPT

## 2025-08-22 PROCEDURE — 93312 ECHO TRANSESOPHAGEAL: CPT

## 2025-08-22 PROCEDURE — 93325 DOPPLER ECHO COLOR FLOW MAPG: CPT

## 2025-08-22 RX ORDER — FENTANYL CITRATE 50 UG/ML
INJECTION, SOLUTION INTRAMUSCULAR; INTRAVENOUS
Status: COMPLETED | OUTPATIENT
Start: 2025-08-22 | End: 2025-08-22

## 2025-08-22 RX ORDER — MIDAZOLAM HYDROCHLORIDE 1 MG/ML
INJECTION, SOLUTION INTRAMUSCULAR; INTRAVENOUS
Status: COMPLETED | OUTPATIENT
Start: 2025-08-22 | End: 2025-08-22

## 2025-08-22 RX ADMIN — MIDAZOLAM 1 MG: 1 INJECTION INTRAMUSCULAR; INTRAVENOUS at 08:12

## 2025-08-22 RX ADMIN — MIDAZOLAM 1 MG: 1 INJECTION INTRAMUSCULAR; INTRAVENOUS at 08:17

## 2025-08-22 RX ADMIN — FENTANYL CITRATE 25 MCG: 50 INJECTION, SOLUTION INTRAMUSCULAR; INTRAVENOUS at 08:12

## 2025-08-22 RX ADMIN — MIDAZOLAM 1 MG: 1 INJECTION INTRAMUSCULAR; INTRAVENOUS at 08:08

## 2025-08-22 RX ADMIN — FENTANYL CITRATE 25 MCG: 50 INJECTION, SOLUTION INTRAMUSCULAR; INTRAVENOUS at 08:08

## 2025-08-25 ENCOUNTER — OFFICE VISIT (OUTPATIENT)
Dept: CARDIOLOGY | Facility: CLINIC | Age: 80
End: 2025-08-25
Payer: MEDICARE

## 2025-08-25 VITALS
BODY MASS INDEX: 20.83 KG/M2 | HEART RATE: 55 BPM | HEIGHT: 66 IN | WEIGHT: 129.6 LBS | OXYGEN SATURATION: 97 % | DIASTOLIC BLOOD PRESSURE: 70 MMHG | SYSTOLIC BLOOD PRESSURE: 168 MMHG

## 2025-08-25 DIAGNOSIS — I48.19 PERSISTENT ATRIAL FIBRILLATION: Primary | ICD-10-CM

## 2025-08-25 DIAGNOSIS — Z95.818 PRESENCE OF WATCHMAN LEFT ATRIAL APPENDAGE CLOSURE DEVICE: ICD-10-CM

## 2025-08-25 DIAGNOSIS — I10 ESSENTIAL HYPERTENSION: ICD-10-CM

## 2025-08-25 RX ORDER — METOPROLOL TARTRATE 25 MG/1
12.5 TABLET, FILM COATED ORAL 2 TIMES DAILY
Qty: 180 TABLET | Refills: 3 | Status: SHIPPED | OUTPATIENT
Start: 2025-08-25

## 2025-08-25 RX ORDER — AMLODIPINE BESYLATE 5 MG/1
5 TABLET ORAL DAILY
Qty: 90 TABLET | Refills: 3 | Status: SHIPPED | OUTPATIENT
Start: 2025-08-25

## (undated) DEVICE — ELECTRD RETRN/GRND MEGADYNE SGL/PLT W/CORD 9FT DISP

## (undated) DEVICE — DRSNG SURESITE123 4X4.8IN

## (undated) DEVICE — CATH ULTRS NUVISION

## (undated) DEVICE — CONTRL CONTRST CHMBRD W/TBG72IN REUS

## (undated) DEVICE — GW DIAG .032

## (undated) DEVICE — CATH DIAG EXPO .052 PIG145 6F 110CM

## (undated) DEVICE — SYS CLS VASC/VENI VASCADE/MVP 10TO12F XL

## (undated) DEVICE — LEX ELECTRO PHYSIOLOGY: Brand: MEDLINE INDUSTRIES, INC.

## (undated) DEVICE — INTRO SHEATH ENGAGE W/50 GW .038 9F12

## (undated) DEVICE — ST EXT IV SMARTSITE PINCH/CLMP 5ML 46CM

## (undated) DEVICE — 1 X VERSACROSS CONNECT TRANSSEPTAL DILATOR (INCLUDING 1 X J-TIP MECHANICAL GUIDEWIRE); 1 X VERSACROSS RF WIRE (INCLUDING 1 X CONNECTOR CABLE (SINGLE USE)); 1 X DISPERSIVE ELECTRODE: Brand: VERSACROSS CONNECT LAAC ACCESS SOLUTION

## (undated) DEVICE — ST EXT IV SMRTSTE 2VLV FIX M LL 6ML 41

## (undated) DEVICE — KT MANIFLD EP

## (undated) DEVICE — ADULT, W/LG. BACK PAD, RADIOTRANSPARENT ELEMENT AND LEAD WIRE COMPATIBLE W/: Brand: DEFIBRILLATION ELECTRODES

## (undated) DEVICE — ADULT NASAL CO2 SAMPLING WITH O2 DELIVERY CANNULA FOR CAPNOFLEX MODULE: Brand: VITAL SIGNS™

## (undated) DEVICE — Device: Brand: MEDEX

## (undated) DEVICE — ST INF PRI SMRTSTE 20DRP 2VLV 24ML 117

## (undated) DEVICE — ACCESS SHEATH WITH DILATOR: Brand: WATCHMAN TRUSTEER™ ACCESS SYSTEM

## (undated) DEVICE — SOL NACL 0.9PCT 1000ML

## (undated) DEVICE — LIMB HOLDER, WRIST/ANKLE: Brand: DEROYAL

## (undated) DEVICE — DOME MONITORING W BONDED STPCK BIOTRANS2

## (undated) DEVICE — SYS CLS VASC/VENI VASCADE MVP 6TO12F

## (undated) DEVICE — DECANT BG O JET

## (undated) DEVICE — SET PRIMARY GRVTY 10DP MALE LL 104IN